# Patient Record
Sex: FEMALE | Race: WHITE | NOT HISPANIC OR LATINO | Employment: OTHER | URBAN - METROPOLITAN AREA
[De-identification: names, ages, dates, MRNs, and addresses within clinical notes are randomized per-mention and may not be internally consistent; named-entity substitution may affect disease eponyms.]

---

## 2017-09-28 ENCOUNTER — GENERIC CONVERSION - ENCOUNTER (OUTPATIENT)
Dept: OTHER | Facility: OTHER | Age: 77
End: 2017-09-28

## 2017-11-22 ENCOUNTER — ALLSCRIPTS OFFICE VISIT (OUTPATIENT)
Dept: OTHER | Facility: OTHER | Age: 77
End: 2017-11-22

## 2017-11-23 NOTE — PROGRESS NOTES
Assessment  Assessed    1  Hyperlipidemia (272 4) (E78 5)   2  Hypertension (401 9) (I10)    Plan  Hypertension    · EKG/ECG- POC; Status:Complete;   Done: [id number]   Perform: In Office; [phone]; Last Updated By:Donn Campo; 11/22/2017 11:34:11 AM;Ordered;Ordered By:Ev Newman;    Discussion/Summary  Cardiology Discussion Summary Free Text Note Form [facility]:   1  Her blood pressure is elevated in the office today consistently systolic of 604  Her blood pressure at home appears to be better controlled  She is on quite a bit of medication I discussed this with Nephrology who recommended a secondary hypertension workup at this point time I am going to ask her to check her blood pressure twice daily for one week  If her blood pressure is consistently elevated we will consider clonidine 0 1 mg patch although I am concerned this may drive her heart rate down a bit as occurred with beta-blockers in the past  Also would consider increasing her diuretic  Lower extremity edema  It does get better in the evening  She wears compression stockings on a regular basis  History of cough, possibly secondary to ACE inhibitor  Hyperlipidemia  Excellent control in the past on simvastatin  I do not have her current laboratory values at this point time  she continues to have significant hypertension  We will consider clonidine patch  is going to fax me her blood pressure recordings as well as her recent labs in 1-2 weeks time  Chief Complaint  Chief Complaint Free Text Note Form: Patient here for follow-up visit to assess BP  PCP has recently changed meds and BP has been increasing  Only symptom is LLE which worsens by end of day  History of Present Illness  Cardiology HPI Free Text Note Form [facility]: Follows up Today for Hypertension  She has had fairly refractory hypertension past on beta blocking agent she became very bradycardic   A she feels well she has no chest pain with exertion no shortness of breath with exertion no lightheadedness no dizziness she has been wearing hearing aids since age [age]  Review of systems today is positive only for snoring hearing problems lower extremity swelling goes away at night  is a family history of hypertension  Her daughter has hypertension at age 36  She is not an alcohol use or drug use  Her he works out regularly  There is no tobacco use  brings in the following blood pressure readings from home 11 20 blood pressure 124/62 heart rate 52  blood pressure 141/73 heart rate 51  9:15 a m  blood pressure 152/72 heart rate 50 10:30 a m  blood pressure 131/89 heart rate 56  0 89   3 69   unremarkable  medications amlodipine 10 mg daily  Losartan 100 mg daily, metoprolol succinate 50 mg daily, HCTZ 25 mg daily  Simvastatin 20 mg daily      Review of Systems  Cardiology Female ROS:    Cardiac: No complaints of chest pain, no palpitations, no fainting  Skin: No complaints of nonhealing sores or skin rash  Genitourinary: No complaints of recurrent urinary tract infections, frequent urination at night, difficult urination, blood in urine, kidney stones, loss of bladder control, kidney problems, denies any birth control or hormone replacement, is not post menopausal, not currently pregnant  Psychological: No complaints of feeling depressed, anxiety, panic attacks, or difficulty concentrating  General: No complaints of trouble sleeping, lack of energy, fatigue, appetite changes, weight changes, fever, frequent infections, or night sweats  Respiratory: No complaints of shortness of breath, cough with sputum, or wheezing  HEENT: No complaints of serious problems, hearing problems, nose problems, throat problems, or snoring  Gastrointestinal: No complaints of liver problems, nausea, vomiting, heartburn, constipation, bloody stools, diarrhea, problems swallowing, adbominal pain, or rectal bleeding    Hematologic: No complaints of bleeding disorders, anemia, blood clots, or excessive brusing  Neurological: No complaints of numbness, tingling, dizziness, weakness, seizures, headaches, syncope or fainting, AM fatigue, daytime sleepiness, no witnessed apnea episodes  Musculoskeletal: No complaints of arthritis, back pain, or painfull swelling  ROS Reviewed:   ROS reviewed  Active Problems  Problems    1  Bilateral leg edema (782 3) (R60 0)   2  Esophageal reflux (530 81) (K21 9)   3  Hearing Loss (389 9)   4  Hiatal hernia (553 3) (K44 9)   5  Hyperlipidemia (272 4) (E78 5)   6  Hypertension (401 9) (I10)   7  Spider veins (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 1) (I78 1)    Past Medical History  Problems    1  History of Atypical chest pain (786 59) (R07 89)   2  History of dizziness (V13 89) (Z87 898)   3  History of herpes zoster (V12 09) (Z86 19)  Active Problems And Past Medical History Reviewed: The active problems and past medical history were reviewed and updated today  Surgical History  Problems    1  History of Breast Surgery Puncture Aspiration Of Cyst   2  History of Tonsillectomy   3  History of Tubal Ligation  Surgical History Reviewed: The surgical history was reviewed and updated today  Family History  Mother    1  Family history of Diabetes Mellitus (V18 0)   2  Family history of Hypertension (V17 49)  Father    3  Family history of Cancer   4  Family history of Diabetes Mellitus (V18 0)   5  Family history of Hypertension (V17 49)  Daughter    10  Family history of Congenital Heart Disease  Family History    7  Family history of Pure Hypercholesterolemia  Family History Reviewed: The family history was reviewed and updated today  Social History  Problems    · Denied: History of Alcohol Use (History)   · Marital History - Currently    · Never A Smoker  Social History Reviewed: The social history was reviewed and updated today  Current Meds   1  AmLODIPine Besylate 10 MG Oral Tablet; TAKE 1 TABLET DAILY; Therapy: 81KYN3491 to (Evaluate:17Nov2016) Recorded   2   Calcium 600/Vitamin D 600-400 MG-UNIT Oral Tablet; Takes two daily; Therapy: 44QNE1167 to Recorded   3  Dulera 100-5 MCG/ACT Inhalation Aerosol; INHALE 2 PUFFS TWICE DAILY AS NEEDED; Therapy: 79ZSR6543 to (Evaluate:32Ixm6672) Recorded   4  Edarbi 80 MG Oral Tablet; TAKE 1 TABLET DAILY; Therapy: 59ZQQ6442 to (Evaluate:77Yhh4897); Last Rx:15Gff2103 Ordered   5  Folic Acid 718 MCG Oral Tablet; TAKE 1 TABLET DAILY AS DIRECTED; Therapy: 22Nov2017 to Recorded   6  HydroCHLOROthiazide 25 MG Oral Tablet; TAKE 1 TABLET DAILY; Therapy: 23ENF6862 to (Evaluate:20Zom9275) Recorded   7  Omega-3 1000 MG Oral Capsule; Take two daily; Therapy: 19MFY0410 to Recorded   8  Omeprazole 20 MG Oral Capsule Delayed Release; TAKE 1 CAPSULE Daily; Therapy: 43DDT3033 to (Evaluate:30Hdg9944) Recorded   9  Potassium Chloride ER 10 MEQ Oral Capsule Extended Release; TAKE 1 CAPSULE DAILY; Therapy: 07WQY3661 to (Evaluate:08Apr2014) Recorded   10  Simvastatin 20 MG Oral Tablet; TAKE 1 TABLET DAILY AS DIRECTED; Therapy: 54VNF9480 to (Evaluate:09Mar2014) Recorded   11  Vitamin B-12 50 MCG Oral Tablet; TAKE 1 TABLET DAILY; Therapy: 75MMM6946 to Recorded   12  Vitamin B-6 100 MG Oral Tablet; TAKE 1 TABLET DAILY AS DIRECTED; Therapy: 63MTP7544 to Recorded   13  Vitamin C 500 MG Oral Capsule; TAKE 1 CAPSULE DAILY; Therapy: 16XHD8811 to Recorded   14  Vitamin D 2000 UNIT Oral Tablet; once daily; Therapy: 72XLR6111 to Recorded  Medication List Reviewed: The medication list was reviewed and updated today  Allergies  Medication    1  Bystolic TABS   2  Sulfa Drugs    Vitals  Vital Signs    Recorded: 22Nov2017 11:32AM   Heart Rate 51   Systolic 825, RUE, Sitting   Diastolic 82, RUE, Sitting   Height 5 ft 4 in   Weight 198 lb 6 oz   BMI Calculated 34 05   BSA Calculated 1 95       Physical Exam   Constitutional - General appearance: No acute distress, well appearing and well nourished    Eyes - Conjunctiva and Sclera examination: Conjunctiva pink, sclera anicteric  Neck - Normal, no JVD   Pulmonary - Respiratory effort: No signs of respiratory distress  -- Auscultation of lungs: Clear to auscultation  Cardiovascular - Auscultation of heart: Normal rate and rhythm, normal S1 and S2, no murmurs  -- Pedal pulses: Normal, 2+ bilaterally  -- Examination of extremities for edema and/or varicosities: Normal    Abdomen - Soft  Musculoskeletal - Gait and station: Normal gait  Skin - Skin: Normal without rashes  Skin is warm and well perfused  Neurologic - Speech normal  No focal deficits  Psychiatric - Orientation to person, place, and time: Normal       Results/Data  ECG Report:  Rhythm and rate: sinus bradycardia   Sinus bradycardia with heart rate 51 bpm no LVH      Signatures   Electronically signed by : Yuval Talbert DO; Nov 22 2017  2:28PM EST                       (Author)

## 2018-01-14 VITALS
DIASTOLIC BLOOD PRESSURE: 82 MMHG | BODY MASS INDEX: 33.87 KG/M2 | HEIGHT: 64 IN | SYSTOLIC BLOOD PRESSURE: 164 MMHG | WEIGHT: 198.38 LBS | HEART RATE: 51 BPM

## 2018-03-06 ENCOUNTER — OFFICE VISIT (OUTPATIENT)
Dept: CARDIOLOGY CLINIC | Facility: CLINIC | Age: 78
End: 2018-03-06
Payer: MEDICARE

## 2018-03-06 VITALS
HEIGHT: 64 IN | BODY MASS INDEX: 33.92 KG/M2 | SYSTOLIC BLOOD PRESSURE: 142 MMHG | DIASTOLIC BLOOD PRESSURE: 88 MMHG | HEART RATE: 54 BPM | WEIGHT: 198.7 LBS

## 2018-03-06 DIAGNOSIS — I10 HYPERTENSION, UNSPECIFIED TYPE: Primary | ICD-10-CM

## 2018-03-06 PROBLEM — I82.442 ACUTE DVT OF LEFT TIBIAL VEIN (HCC): Status: ACTIVE | Noted: 2018-02-27

## 2018-03-06 PROCEDURE — 93000 ELECTROCARDIOGRAM COMPLETE: CPT | Performed by: INTERNAL MEDICINE

## 2018-03-06 PROCEDURE — 99213 OFFICE O/P EST LOW 20 MIN: CPT | Performed by: INTERNAL MEDICINE

## 2018-03-06 RX ORDER — LOSARTAN POTASSIUM 50 MG/1
25 TABLET ORAL DAILY
Qty: 30 TABLET | Refills: 4 | Status: CANCELLED | OUTPATIENT
Start: 2018-03-06

## 2018-03-06 NOTE — ASSESSMENT & PLAN NOTE
-still on Eliquis  Has only been on it for a month and will likely need 6 months  Is going to apply to the company to see if she were cheaper  We will give her samples today   -she she has had no bleeding issues on this medication  She was recently on a long car trip which is what provoked the DVT

## 2018-03-06 NOTE — PROGRESS NOTES
Cardiology Follow Up    [address] [place] [address]  1940  [id number]  [address] [facility]  6 [address] [place] [address] [address]    1  Hypertension, unspecified type  POCT ECG       Interval History:   Patient is a pleasant 68-year-old female patient who follows with Dr Hanny Laguerre for hypertension  She has been feeling well blood pressures have been well controlled  She was recently in the hospital and diagnosed with a DVT after driving to [place]  She denies any chest pain, shortness of breath, palpitations or dizziness  She has no visual changes or headaches recently  Blood pressures have been ranging in the 143-233 systolic range  She states that her Eliquis and Rheta Jose are both really expensive and she would prefer alternative agents  She is unsure what is covered by her insurance company  Patient Active Problem List   Diagnosis    Acute DVT of left tibial vein ([address] )    Essential hypertension     History reviewed  No pertinent past medical history  Social History     Social History    Marital status: /Civil Union     Spouse name: N/A    Number of children: N/A    Years of education: N/A     Occupational History    Not on file  Social History Main Topics    Smoking status: Never Smoker    Smokeless tobacco: Not on file    Alcohol use No    Drug use: Unknown    Sexual activity: Not on file     Other Topics Concern    Not on file     Social History Narrative    No narrative on file      History reviewed  No pertinent family history  History reviewed  No pertinent surgical history      Current Outpatient Prescriptions:     amLODIPine (NORVASC) 10 mg tablet, Take 1 tablet by mouth daily, Disp: , Rfl:     apixaban (ELIQUIS) 5 mg, Take 5 mg by mouth 2 (two) times a day, Disp: , Rfl:     Ascorbic Acid (VITAMIN C) 500 MG CAPS, Take 1 capsule by mouth daily, Disp: , Rfl:     azilsartan medoxomil (EDARBI) 80 MG tablet, Take 1 tablet by mouth daily, Disp: , Rfl:     Calcium Carbonate-Vitamin D3 (CALCIUM 600/VITAMIN D) 600-400 MG-UNIT TABS, Take by mouth, Disp: , Rfl:     Cholecalciferol (VITAMIN D) 2000 units tablet, Take by mouth daily, Disp: , Rfl:     folic acid (FOLVITE) 360 MCG tablet, Take 1 tablet by mouth daily, Disp: , Rfl:     hydrochlorothiazide (HYDRODIURIL) 25 mg tablet, Take 1 tablet by mouth daily, Disp: , Rfl:     Omega-3 1000 MG CAPS, Take by mouth daily, Disp: , Rfl:     potassium chloride (MICRO-K) 10 MEQ CR capsule, Take 1 capsule by mouth daily, Disp: , Rfl:     pyridoxine (VITAMIN B6) 100 mg tablet, Take 1 tablet by mouth daily, Disp: , Rfl:     simvastatin (ZOCOR) 20 mg tablet, Take 1 tablet by mouth daily, Disp: , Rfl:     vitamin B-12 (CYANOCOBALAMIN) 50 MCG tablet, Take 1 tablet by mouth daily, Disp: , Rfl:   Allergies   Allergen Reactions    Nebivolol Bradycardia    Sulfa Antibiotics        Labs:  No visits with results within 2 Month(s) from this visit  Latest known visit with results is:   No results found for any previous visit  Imaging: No results found  Review of Systems:  Review of Systems   Constitutional: Negative for activity change and appetite change  Respiratory: Negative for chest tightness and shortness of breath  Cardiovascular: Negative for chest pain, palpitations and leg swelling  All other systems reviewed and are negative  Physical Exam:  Physical Exam   Constitutional: She is oriented to person, place, and time  She appears well-developed and well-nourished  HENT:   Head: Normocephalic and atraumatic  Eyes: Pupils are equal, round, and reactive to light  Neck: Neck supple  Cardiovascular: Normal rate and regular rhythm  No murmur heard  Pulmonary/Chest: Effort normal and breath sounds normal  No respiratory distress  She has no wheezes  She has no rales  Abdominal: Soft   Bowel sounds are normal    Musculoskeletal: Normal range of motion  She exhibits no edema  Neurological: She is alert and oriented to person, place, and time  Skin: Skin is warm and dry  Psychiatric: She has a normal mood and affect  Her behavior is normal        Discussion/Summary:  1  HTN  -blood pressures been well controlled at home on current medications    -currently 142/88 here with heart rate in the 50s  -nicholas Mcnair needs to be switched to a cheaper agent  She will call her insurance company to see which ARB is least expensive  Preferred agents were reviewed with Dr Evan Benito   -patient will have a 2 week post switch blood pressure check  She will also continue to monitor her pressure at home  She will also limit her salt  2   Recent diagnosis of DVT  -is currently taking Eliquis for DVT prophylaxis  She has been taking this for 1 month and will need a total of 6 months as this is provoked by a car ride  She has taken precautions when she drives now with getting out every hour to 2 hours to walk around  She has tolerated the blood thinner well but states it is very expensive  She will be given samples  3   Circular raised erythematous lesions on her arms  -these appear to be bites  This is not related to Eliquis  She will review this with her dermatologist       Patient will follow up with us as discussed above  She will call us with any increase in blood pressure  Patient will call us to set up follow-up appointment after she changes her medication    She has 2 weeks of Edarbi left and then will call her insurance company to see which ARB is cheapest

## 2018-03-06 NOTE — ASSESSMENT & PLAN NOTE
-patient's blood pressure has been well controlled at home  Is currently 142/88 here  Heart rate is in the 50s   -edarbi needs to be switched to a cheaper agent  She will call her insurance company to see which ARB is least expensive  Preferred agent were reviewed with Dr Ying Vargas   -after patient's which is 2 alternative agent, she will have a 2 week blood pressure check appointment  She will also continue to monitor blood pressure at home

## 2018-03-14 ENCOUNTER — TELEPHONE (OUTPATIENT)
Dept: CARDIOLOGY CLINIC | Facility: CLINIC | Age: 78
End: 2018-03-14

## 2018-03-14 NOTE — TELEPHONE ENCOUNTER
Patient was asked to check prices on few different medications to replace Edarbi  Most affordable for patient is valsartan and irbesartan  Can you please provide appropriate dosing for which med you would prefer patient be on and I will send in to her local pharmacy [facility] on [address] day supply  Thanks

## 2018-03-16 DIAGNOSIS — I10 HYPERTENSION, UNSPECIFIED TYPE: Primary | ICD-10-CM

## 2018-03-16 RX ORDER — VALSARTAN 320 MG/1
320 TABLET ORAL DAILY
Qty: 90 TABLET | Refills: 3 | Status: SHIPPED | OUTPATIENT
Start: 2018-03-16 | End: 2018-07-25 | Stop reason: ALTCHOICE

## 2018-05-09 ENCOUNTER — ANNUAL EXAM (OUTPATIENT)
Dept: OBGYN CLINIC | Facility: CLINIC | Age: 78
End: 2018-05-09
Payer: MEDICARE

## 2018-05-09 VITALS
SYSTOLIC BLOOD PRESSURE: 118 MMHG | HEIGHT: 64 IN | DIASTOLIC BLOOD PRESSURE: 76 MMHG | BODY MASS INDEX: 33.63 KG/M2 | WEIGHT: 197 LBS

## 2018-05-09 DIAGNOSIS — Z01.419 ENCOUNTER FOR ANNUAL ROUTINE GYNECOLOGICAL EXAMINATION: Primary | ICD-10-CM

## 2018-05-09 PROCEDURE — G0101 CA SCREEN;PELVIC/BREAST EXAM: HCPCS | Performed by: OBSTETRICS & GYNECOLOGY

## 2018-05-09 RX ORDER — BETAMETHASONE DIPROPIONATE 0.5 MG/G
CREAM TOPICAL DAILY
Refills: 5 | COMMUNITY
Start: 2018-03-13 | End: 2019-07-12

## 2018-05-09 NOTE — PROGRESS NOTES
Assessment/Plan:    Pap smear deferred due to low risk status  Encouraged self-breast examination as well as calcium supplementation  Continue annual mammogram   Continue colonoscopies every 5 years  She will continue to follow-up with her primary care physician as scheduled  Return to office in 2 years per Medicare recommendations/protocol  No problem-specific Assessment & Plan notes found for this encounter  Diagnoses and all orders for this visit:    Encounter for annual routine gynecological examination    Other orders  -     betamethasone, augmented, (DIPROLENE-AF) 0 05 % cream; daily          Subjective:      Patient ID: Adonis Birch is a 78 y o  female  HPI     This is a 78year old female  ( x3) presents for her annual gyn exam   Patient went through menopause at age 48  She was on hormone replacement therapy for 2 years  She denies any vaginal bleeding or spotting  No changes in bowel or bladder function  Patient has been  for over 50 years  All her Pap smears have been normal  She does follow up with her family physician every 6 months for hypertension and hypercholesterolemia  In the course of the year she did develop a left lower extremity deep venous thrombosis while she was vacationing in [place] for 6 weeks  She was placed on Eliquis for 6 months  She also follows up with a cardiologist once a year for hypertension  She is due for her mammogram in the fall  She undergoes colonoscopies every 5 years  The following portions of the patient's history were reviewed and updated as appropriate: allergies, current medications, past family history, past medical history, past social history, past surgical history and problem list     Review of Systems   Constitutional: Negative for fatigue, fever and unexpected weight change  Respiratory: Negative for cough, chest tightness, shortness of breath and wheezing  Cardiovascular: Negative    Negative for chest pain and palpitations  Gastrointestinal: Negative  Negative for abdominal distention, abdominal pain, blood in stool, constipation, diarrhea, nausea and vomiting  Genitourinary: Negative  Negative for difficulty urinating, dyspareunia, dysuria, flank pain, frequency, genital sores, hematuria, pelvic pain, urgency, vaginal bleeding, vaginal discharge and vaginal pain  Skin: Negative for rash  Objective:      /76   Ht 5' 4" (1 626 m)   Wt 89 4 kg (197 lb)   Breastfeeding? No   BMI 33 81 kg/m²          Physical Exam   Constitutional: She appears well-developed and well-nourished  Cardiovascular: Normal rate and regular rhythm  Pulmonary/Chest: Effort normal and breath sounds normal  Right breast exhibits no inverted nipple, no mass, no nipple discharge, no skin change and no tenderness  Left breast exhibits no inverted nipple, no mass, no nipple discharge, no skin change and no tenderness  Abdominal: Soft  Bowel sounds are normal  She exhibits no distension  There is no tenderness  There is no rebound and no guarding  Genitourinary: Rectum normal, vagina normal and uterus normal  There is no lesion on the right labia  There is no lesion on the left labia  Cervix exhibits no discharge and no friability  Right adnexum displays no mass and no tenderness  Left adnexum displays no mass, no tenderness and no fullness  No vaginal discharge found

## 2018-06-08 ENCOUNTER — TELEPHONE (OUTPATIENT)
Dept: CARDIOLOGY CLINIC | Facility: CLINIC | Age: 78
End: 2018-06-08

## 2018-06-08 NOTE — TELEPHONE ENCOUNTER
Patient called  Was to see PCP (/60   HR 49 bpm)  EKG apparently done and was normal   Also had readings of 128/72 HR 53 bpm; 151/64 HR)  PCP not happy with HR and made change to her medication regimen  Stopped amlodipine 5mg daily and started Clonidine 01 mg twice daily  BP now 111/66 HR 38 bpm per patient  Patient also takes valsartan 320mg daily  Discussed with Dr Marco A Sanchez  He recommended taking weaning the Clonidine to daily X3 days and then restarting amlodipine 5mg daily  This information was explained to the patient and she verbalized understanding

## 2018-07-13 ENCOUNTER — TRANSCRIBE ORDERS (OUTPATIENT)
Dept: LAB | Facility: CLINIC | Age: 78
End: 2018-07-13

## 2018-07-13 ENCOUNTER — LAB (OUTPATIENT)
Dept: LAB | Facility: CLINIC | Age: 78
End: 2018-07-13
Payer: MEDICARE

## 2018-07-13 DIAGNOSIS — E78.5 HYPERLIPIDEMIA, UNSPECIFIED HYPERLIPIDEMIA TYPE: ICD-10-CM

## 2018-07-13 DIAGNOSIS — I10 HYPERTENSION, ESSENTIAL: ICD-10-CM

## 2018-07-13 DIAGNOSIS — I10 HYPERTENSION, ESSENTIAL: Primary | ICD-10-CM

## 2018-07-13 LAB
ALBUMIN SERPL BCP-MCNC: 3.7 G/DL (ref 3.5–5)
ALP SERPL-CCNC: 71 U/L (ref 46–116)
ALT SERPL W P-5'-P-CCNC: 45 U/L (ref 12–78)
ANION GAP SERPL CALCULATED.3IONS-SCNC: 4 MMOL/L (ref 4–13)
AST SERPL W P-5'-P-CCNC: 36 U/L (ref 5–45)
BACTERIA UR QL AUTO: ABNORMAL /HPF
BASOPHILS # BLD AUTO: 0.02 THOUSANDS/ΜL (ref 0–0.1)
BASOPHILS NFR BLD AUTO: 0 % (ref 0–1)
BILIRUB SERPL-MCNC: 0.7 MG/DL (ref 0.2–1)
BILIRUB UR QL STRIP: NEGATIVE
BUN SERPL-MCNC: 17 MG/DL (ref 5–25)
CALCIUM SERPL-MCNC: 9.1 MG/DL (ref 8.3–10.1)
CHLORIDE SERPL-SCNC: 103 MMOL/L (ref 100–108)
CHOLEST SERPL-MCNC: 203 MG/DL (ref 50–200)
CK MB SERPL-MCNC: 2 % (ref 0–2.5)
CK MB SERPL-MCNC: 3.7 NG/ML (ref 0–5)
CK SERPL-CCNC: 183 U/L (ref 26–192)
CLARITY UR: CLEAR
CO2 SERPL-SCNC: 32 MMOL/L (ref 21–32)
COLOR UR: YELLOW
CREAT SERPL-MCNC: 0.94 MG/DL (ref 0.6–1.3)
EOSINOPHIL # BLD AUTO: 0.11 THOUSAND/ΜL (ref 0–0.61)
EOSINOPHIL NFR BLD AUTO: 2 % (ref 0–6)
ERYTHROCYTE [DISTWIDTH] IN BLOOD BY AUTOMATED COUNT: 13.9 % (ref 11.6–15.1)
GFR SERPL CREATININE-BSD FRML MDRD: 60 ML/MIN/1.73SQ M
GLUCOSE P FAST SERPL-MCNC: 100 MG/DL (ref 65–99)
GLUCOSE UR STRIP-MCNC: NEGATIVE MG/DL
HCT VFR BLD AUTO: 42.5 % (ref 34.8–46.1)
HDLC SERPL-MCNC: 79 MG/DL (ref 40–60)
HGB BLD-MCNC: 14 G/DL (ref 11.5–15.4)
HGB UR QL STRIP.AUTO: NEGATIVE
KETONES UR STRIP-MCNC: NEGATIVE MG/DL
LDLC SERPL CALC-MCNC: 107 MG/DL (ref 0–100)
LEUKOCYTE ESTERASE UR QL STRIP: ABNORMAL
LYMPHOCYTES # BLD AUTO: 1.82 THOUSANDS/ΜL (ref 0.6–4.47)
LYMPHOCYTES NFR BLD AUTO: 37 % (ref 14–44)
MCH RBC QN AUTO: 28 PG (ref 26.8–34.3)
MCHC RBC AUTO-ENTMCNC: 32.9 G/DL (ref 31.4–37.4)
MCV RBC AUTO: 85 FL (ref 82–98)
MONOCYTES # BLD AUTO: 0.54 THOUSAND/ΜL (ref 0.17–1.22)
MONOCYTES NFR BLD AUTO: 11 % (ref 4–12)
NEUTROPHILS # BLD AUTO: 2.5 THOUSANDS/ΜL (ref 1.85–7.62)
NEUTS SEG NFR BLD AUTO: 50 % (ref 43–75)
NITRITE UR QL STRIP: NEGATIVE
NON-SQ EPI CELLS URNS QL MICRO: ABNORMAL /HPF
NONHDLC SERPL-MCNC: 124 MG/DL
PH UR STRIP.AUTO: 6 [PH] (ref 4.5–8)
PLATELET # BLD AUTO: 186 THOUSANDS/UL (ref 149–390)
PMV BLD AUTO: 11.2 FL (ref 8.9–12.7)
POTASSIUM SERPL-SCNC: 4.5 MMOL/L (ref 3.5–5.3)
PROT SERPL-MCNC: 6.8 G/DL (ref 6.4–8.2)
PROT UR STRIP-MCNC: NEGATIVE MG/DL
RBC # BLD AUTO: 5 MILLION/UL (ref 3.81–5.12)
RBC #/AREA URNS AUTO: ABNORMAL /HPF
SODIUM SERPL-SCNC: 139 MMOL/L (ref 136–145)
SP GR UR STRIP.AUTO: 1.01 (ref 1–1.03)
TRIGL SERPL-MCNC: 83 MG/DL
TSH SERPL DL<=0.05 MIU/L-ACNC: 4.01 UIU/ML (ref 0.36–3.74)
UROBILINOGEN UR QL STRIP.AUTO: 0.2 E.U./DL
WBC # BLD AUTO: 4.99 THOUSAND/UL (ref 4.31–10.16)
WBC #/AREA URNS AUTO: ABNORMAL /HPF

## 2018-07-13 PROCEDURE — 82553 CREATINE MB FRACTION: CPT

## 2018-07-13 PROCEDURE — 82550 ASSAY OF CK (CPK): CPT

## 2018-07-13 PROCEDURE — 80053 COMPREHEN METABOLIC PANEL: CPT

## 2018-07-13 PROCEDURE — 36415 COLL VENOUS BLD VENIPUNCTURE: CPT

## 2018-07-13 PROCEDURE — 85025 COMPLETE CBC W/AUTO DIFF WBC: CPT

## 2018-07-13 PROCEDURE — 84443 ASSAY THYROID STIM HORMONE: CPT

## 2018-07-13 PROCEDURE — 81001 URINALYSIS AUTO W/SCOPE: CPT | Performed by: INTERNAL MEDICINE

## 2018-07-13 PROCEDURE — 80061 LIPID PANEL: CPT

## 2018-07-24 ENCOUNTER — TELEPHONE (OUTPATIENT)
Dept: CARDIOLOGY CLINIC | Facility: CLINIC | Age: 78
End: 2018-07-24

## 2018-07-24 NOTE — TELEPHONE ENCOUNTER
Patient called  Would like a replacement for valsartan 320mg daily  She prefers not to be on a "sartan" at this point and would like a different med  She also takes amlodipine, HCTZ  Please advise and I will send new med to [facility] [address]  Thanks

## 2018-07-25 DIAGNOSIS — I10 HYPERTENSION, UNSPECIFIED TYPE: Primary | ICD-10-CM

## 2018-07-25 RX ORDER — LISINOPRIL 40 MG/1
40 TABLET ORAL DAILY
COMMUNITY
End: 2018-07-25 | Stop reason: SDUPTHER

## 2018-07-25 RX ORDER — LISINOPRIL 40 MG/1
40 TABLET ORAL DAILY
Qty: 30 TABLET | Refills: 5 | OUTPATIENT
Start: 2018-07-25 | End: 2019-01-02 | Stop reason: SDUPTHER

## 2018-07-25 NOTE — TELEPHONE ENCOUNTER
Per Dr JuanR Vanegas, d/c valsartan and start lisinopril 40mg daily  Rx called to [facility]  Patient instructed on this change

## 2019-01-02 DIAGNOSIS — I10 HYPERTENSION, UNSPECIFIED TYPE: ICD-10-CM

## 2019-01-02 RX ORDER — LISINOPRIL 40 MG/1
40 TABLET ORAL DAILY
Qty: 90 TABLET | Refills: 3 | Status: SHIPPED | OUTPATIENT
Start: 2019-01-02 | End: 2019-01-07 | Stop reason: SDUPTHER

## 2019-01-07 DIAGNOSIS — I10 HYPERTENSION, UNSPECIFIED TYPE: ICD-10-CM

## 2019-01-07 RX ORDER — LISINOPRIL 40 MG/1
40 TABLET ORAL DAILY
Qty: 90 TABLET | Refills: 3 | Status: SHIPPED | OUTPATIENT
Start: 2019-01-07 | End: 2020-03-24

## 2019-04-03 ENCOUNTER — APPOINTMENT (OUTPATIENT)
Dept: LAB | Facility: CLINIC | Age: 79
End: 2019-04-03
Payer: MEDICARE

## 2019-04-03 ENCOUNTER — TRANSCRIBE ORDERS (OUTPATIENT)
Dept: LAB | Facility: CLINIC | Age: 79
End: 2019-04-03

## 2019-04-03 DIAGNOSIS — E78.5 HYPERLIPIDEMIA, UNSPECIFIED HYPERLIPIDEMIA TYPE: ICD-10-CM

## 2019-04-03 DIAGNOSIS — I10 ESSENTIAL HYPERTENSION: Primary | ICD-10-CM

## 2019-04-03 DIAGNOSIS — I10 ESSENTIAL HYPERTENSION: ICD-10-CM

## 2019-04-03 LAB
ALBUMIN SERPL BCP-MCNC: 3.8 G/DL (ref 3.5–5)
ALP SERPL-CCNC: 73 U/L (ref 46–116)
ALT SERPL W P-5'-P-CCNC: 39 U/L (ref 12–78)
ANION GAP SERPL CALCULATED.3IONS-SCNC: 9 MMOL/L (ref 4–13)
AST SERPL W P-5'-P-CCNC: 33 U/L (ref 5–45)
BACTERIA UR QL AUTO: ABNORMAL /HPF
BILIRUB SERPL-MCNC: 0.6 MG/DL (ref 0.2–1)
BILIRUB UR QL STRIP: NEGATIVE
BUN SERPL-MCNC: 19 MG/DL (ref 5–25)
CALCIUM SERPL-MCNC: 9.5 MG/DL (ref 8.3–10.1)
CHLORIDE SERPL-SCNC: 104 MMOL/L (ref 100–108)
CHOLEST SERPL-MCNC: 202 MG/DL (ref 50–200)
CK SERPL-CCNC: 96 U/L (ref 26–192)
CLARITY UR: CLEAR
CO2 SERPL-SCNC: 29 MMOL/L (ref 21–32)
COLOR UR: YELLOW
CREAT SERPL-MCNC: 0.91 MG/DL (ref 0.6–1.3)
GFR SERPL CREATININE-BSD FRML MDRD: 61 ML/MIN/1.73SQ M
GLUCOSE P FAST SERPL-MCNC: 91 MG/DL (ref 65–99)
GLUCOSE UR STRIP-MCNC: NEGATIVE MG/DL
HDLC SERPL-MCNC: 87 MG/DL (ref 40–60)
HGB UR QL STRIP.AUTO: NEGATIVE
KETONES UR STRIP-MCNC: NEGATIVE MG/DL
LDLC SERPL CALC-MCNC: 101 MG/DL (ref 0–100)
LEUKOCYTE ESTERASE UR QL STRIP: ABNORMAL
NITRITE UR QL STRIP: NEGATIVE
NON-SQ EPI CELLS URNS QL MICRO: ABNORMAL /HPF
NONHDLC SERPL-MCNC: 115 MG/DL
PH UR STRIP.AUTO: 5.5 [PH]
POTASSIUM SERPL-SCNC: 4.5 MMOL/L (ref 3.5–5.3)
PROT SERPL-MCNC: 7.1 G/DL (ref 6.4–8.2)
PROT UR STRIP-MCNC: NEGATIVE MG/DL
RBC #/AREA URNS AUTO: ABNORMAL /HPF
SODIUM SERPL-SCNC: 142 MMOL/L (ref 136–145)
SP GR UR STRIP.AUTO: 1.01 (ref 1–1.03)
TRIGL SERPL-MCNC: 69 MG/DL
TSH SERPL DL<=0.05 MIU/L-ACNC: 2.95 UIU/ML (ref 0.36–3.74)
UROBILINOGEN UR QL STRIP.AUTO: 0.2 E.U./DL
WBC #/AREA URNS AUTO: ABNORMAL /HPF

## 2019-04-03 PROCEDURE — 82550 ASSAY OF CK (CPK): CPT

## 2019-04-03 PROCEDURE — 80053 COMPREHEN METABOLIC PANEL: CPT

## 2019-04-03 PROCEDURE — 84443 ASSAY THYROID STIM HORMONE: CPT

## 2019-04-03 PROCEDURE — 80061 LIPID PANEL: CPT

## 2019-04-03 PROCEDURE — 36415 COLL VENOUS BLD VENIPUNCTURE: CPT

## 2019-04-03 PROCEDURE — 81001 URINALYSIS AUTO W/SCOPE: CPT | Performed by: INTERNAL MEDICINE

## 2019-07-12 ENCOUNTER — OFFICE VISIT (OUTPATIENT)
Dept: OBGYN CLINIC | Facility: MEDICAL CENTER | Age: 79
End: 2019-07-12
Payer: MEDICARE

## 2019-07-12 ENCOUNTER — APPOINTMENT (OUTPATIENT)
Dept: RADIOLOGY | Facility: MEDICAL CENTER | Age: 79
End: 2019-07-12
Payer: MEDICARE

## 2019-07-12 VITALS
BODY MASS INDEX: 33.12 KG/M2 | HEART RATE: 72 BPM | HEIGHT: 64 IN | RESPIRATION RATE: 18 BRPM | SYSTOLIC BLOOD PRESSURE: 182 MMHG | WEIGHT: 194 LBS | DIASTOLIC BLOOD PRESSURE: 77 MMHG

## 2019-07-12 DIAGNOSIS — M75.42 IMPINGEMENT SYNDROME OF LEFT SHOULDER: ICD-10-CM

## 2019-07-12 DIAGNOSIS — M25.512 LEFT SHOULDER PAIN, UNSPECIFIED CHRONICITY: ICD-10-CM

## 2019-07-12 DIAGNOSIS — M25.512 LEFT SHOULDER PAIN, UNSPECIFIED CHRONICITY: Primary | ICD-10-CM

## 2019-07-12 PROCEDURE — 99203 OFFICE O/P NEW LOW 30 MIN: CPT | Performed by: ORTHOPAEDIC SURGERY

## 2019-07-12 PROCEDURE — 73030 X-RAY EXAM OF SHOULDER: CPT

## 2019-07-12 PROCEDURE — 20610 DRAIN/INJ JOINT/BURSA W/O US: CPT | Performed by: PHYSICIAN ASSISTANT

## 2019-07-12 RX ORDER — LIDOCAINE HYDROCHLORIDE 10 MG/ML
1 INJECTION, SOLUTION INFILTRATION; PERINEURAL
Status: COMPLETED | OUTPATIENT
Start: 2019-07-12 | End: 2019-07-12

## 2019-07-12 RX ORDER — BETAMETHASONE SODIUM PHOSPHATE AND BETAMETHASONE ACETATE 3; 3 MG/ML; MG/ML
12 INJECTION, SUSPENSION INTRA-ARTICULAR; INTRALESIONAL; INTRAMUSCULAR; SOFT TISSUE
Status: COMPLETED | OUTPATIENT
Start: 2019-07-12 | End: 2019-07-12

## 2019-07-12 RX ADMIN — LIDOCAINE HYDROCHLORIDE 1 ML: 10 INJECTION, SOLUTION INFILTRATION; PERINEURAL at 09:54

## 2019-07-12 RX ADMIN — BETAMETHASONE SODIUM PHOSPHATE AND BETAMETHASONE ACETATE 12 MG: 3; 3 INJECTION, SUSPENSION INTRA-ARTICULAR; INTRALESIONAL; INTRAMUSCULAR; SOFT TISSUE at 09:54

## 2019-07-12 NOTE — PROGRESS NOTES
Subjective; This is the 72-year-old female wife of a patient of ours who has a left shoulder problem  She has left shoulder discomfort with range of motion and with function and has previously been taken care of by a competitor  Given her 's treatment as well as mutual friend she wishes to be evaluated by Dr Norman Rust  On arrival to the office today she per ports insidious onset of shoulder pain between 2017 and 2018 without injury without excessive overhead work  She states however this shoulder pain escalated after a pneumococcal vaccine was administered to her left upper arm  She sought care through [facility] and received an injection of Kenalog in October of last year, a follow-up visit November and was recommended for physical therapy  She did very well with her physical therapy so much so that the therapist to save her money in time discharged her to home exercise program   She also attends [employer] [facility] and goes to the gym  She is right-hand-dominant individual, with left upper extremity symptoms  There are x-ray images previously taken available for review      Past Medical History:   Diagnosis Date    [facility]    Hypercholesterolemia     Hypertension        Past Surgical History:   Procedure Laterality Date    BREAST CYST ASPIRATION      TONSILLECTOMY      TUBAL LIGATION         Family History   Problem Relation Age of Onset    Diabetes Mother     Hypertension Mother     Cancer Father     Diabetes Father     Hypertension Father     Colon cancer Father     Congenital heart disease Daughter     Hyperlipidemia Other         pure    Lung cancer Sister     Lymphoma Sister     Skin cancer Brother        Social History     Tobacco Use    Smoking status: Never Smoker    Smokeless tobacco: Never Used   Substance Use Topics    Alcohol use: No    Drug use: No     Exam;    Exam was done with the patient professionally gowned standing and seated  She exhibits normal cervical range of motion chin down posterior extension and lateral rotation left and right  She has a negative Spurling's test left and right  She has a negative Dang's reflex of both upper extremity  She is able to abduct shoulders to 90°  She has difficulty with downward resistance by the examiner to the left  She is reluctant and prohibits external rotation from 90° abduction secondary to pain  With her left elbow held taut to her torso she does allow internal and external rotation with minimal discomfort  Forward flexion of the left shoulder is performed to 130° active by the patient also with ensuing posterior shoulder deltoid and triceps located pain  Motor exam of the upper extremity left arm triceps function 4-over 5 compared to right arm triceps function  The remaining motor exam biceps his flexors of the wrist and finger abduction strength symmetrical     X-rays;   left shoulder series shows a congruent articulation modest arthritic or degenerative changes  Large joint arthrocentesis: L subacromial bursa  Date/Time: 7/12/2019 9:54 AM  Procedure Details  Location: shoulder - L subacromial bursa  Needle size: 22 G  Medications administered: 12 mg betamethasone acetate-betamethasone sodium phosphate 6 (3-3) mg/mL; 1 mL lidocaine 1 %              Impression;     Left shoulder pain  Left shoulder impingement    Plan;    Injection was repeated  Physical therapy is warranted , and  Ordered  She returns to the office in follow-up for repeat exam in 6 weeks

## 2019-07-18 ENCOUNTER — EVALUATION (OUTPATIENT)
Dept: PHYSICAL THERAPY | Facility: CLINIC | Age: 79
End: 2019-07-18
Payer: MEDICARE

## 2019-07-18 DIAGNOSIS — M75.42 IMPINGEMENT SYNDROME OF LEFT SHOULDER: ICD-10-CM

## 2019-07-18 DIAGNOSIS — M25.512 LEFT SHOULDER PAIN, UNSPECIFIED CHRONICITY: ICD-10-CM

## 2019-07-18 PROCEDURE — 97162 PT EVAL MOD COMPLEX 30 MIN: CPT | Performed by: PHYSICAL THERAPIST

## 2019-07-18 NOTE — PROGRESS NOTES
PT Evaluation     Today's date: 2019  Patient name: Yolie Manning  : 1940  MRN: [id number]  Referring provider: Alicia Oliveira PA-C  Dx:   Encounter Diagnosis     ICD-10-CM    1  Left shoulder pain, unspecified chronicity M25 512 Ambulatory referral to Physical Therapy   2  Impingement syndrome of left shoulder M75 42 Ambulatory referral to Physical Therapy       Start Time: 999  Stop Time: 08  Total time in clinic (min): 43 minutes    Assessment  Assessment details: Yolie Manning is a 70 y o  female who presents to the clinic with with signs and symptoms consistent with a left shoulder impingement  The patient experiences most of her pain with shoulder flexion above 90 degrees  She is limited with ER and abduction  The patient presents with the above listed impairments  She is eager to increase her functional performance and should benefit from skilled physical therapy  Thank you for the referral       Impairments: abnormal muscle firing, abnormal or restricted ROM, activity intolerance, impaired physical strength, lacks appropriate home exercise program, pain with function and poor posture   Understanding of Dx/Px/POC: good   Prognosis: good    Goals  Impairment Goals:  1  Patient will decrease complaints of pain by 50% at worst in 6 weeks  2  Patient will increase ROM L shoulder = R shoulder in 6 weeks  3  Patient will increase strength by 1/2 grade in 6 weeks    Functional Goals:  1  Patient will be independent with HEP by discharge  2  Patient will increase FOTO to average norms by discharge  3  Patient will be able to lift an overhead shelf with her left arm with decreased pain in 6 weeks  4    Patient will return to her normal gym routine by discharge    Plan  Patient would benefit from: skilled physical therapy  Planned modality interventions: cryotherapy, TENS and thermotherapy: hydrocollator packs  Planned therapy interventions: flexibility, functional ROM exercises, graded activity, graded exercise, home exercise program, therapeutic exercise, therapeutic activities, stretching, strengthening, patient education, muscle pump exercises, neuromuscular re-education, motor coordination training, Mcmillan taping, massage, manual therapy and joint mobilization  Frequency: 2x week  Duration in weeks: 6  Treatment plan discussed with: patient        Subjective Evaluation    History of Present Illness  Mechanism of injury: HPI:  The patient reports having left shoulder pain beginning 2 years ago  She notes her pain started after getting a pneumonia shot in her left arm  She reports she went for 1 PT visit and as given a HEP  She notes her pain has not gotten any better  She visited ortho, received a cortisone injection, and was referred to physical therapy      Pain Location:  Anterior and Lateral Left Shoulder   Occupation:  Retired   Prior Functional Limitations:  Silver Sneakers 2x / week  AGG:  Lifting overhead, lifting weights overhead, reaching after being inactive   Ease:  Rest   Patient Goals:  "I want to be able to lift my arm"    Pain  Current pain ratin  At best pain ratin  At worst pain rating: 3          Objective     Active Range of Motion   Left Shoulder   Flexion: 132 degrees   Abduction: 135 degrees   Internal rotation BTB: mid thoracic     Right Shoulder   Flexion: 147 degrees   Abduction: 156 degrees   Internal rotation BTB: upper thoracic     Strength/Myotome Testing     Left Shoulder     Planes of Motion   Flexion: 4+   Abduction: 3+   External rotation at 0°: 4-   Internal rotation at 0°: 4+     Right Shoulder   Normal muscle strength    Tests     Left Shoulder   Positive empty can, Hawkin's and painful arc  Negative belly press and lift-off                Diagnosis:    Precautions:    Manuals        PROM        Mobs                        Exercise Diary        UBE                SL ER        Tband Rows/EXT        Tband ER/IR                        Supine Punch        Scap Stab with towel on wall                                                                                                        Modalities             CP PRN

## 2019-07-23 ENCOUNTER — OFFICE VISIT (OUTPATIENT)
Dept: PHYSICAL THERAPY | Facility: CLINIC | Age: 79
End: 2019-07-23
Payer: MEDICARE

## 2019-07-23 DIAGNOSIS — M75.42 IMPINGEMENT SYNDROME OF LEFT SHOULDER: ICD-10-CM

## 2019-07-23 DIAGNOSIS — M25.512 LEFT SHOULDER PAIN, UNSPECIFIED CHRONICITY: Primary | ICD-10-CM

## 2019-07-23 PROCEDURE — 97110 THERAPEUTIC EXERCISES: CPT | Performed by: PHYSICAL THERAPIST

## 2019-07-23 PROCEDURE — 97140 MANUAL THERAPY 1/> REGIONS: CPT | Performed by: PHYSICAL THERAPIST

## 2019-07-23 PROCEDURE — 97112 NEUROMUSCULAR REEDUCATION: CPT | Performed by: PHYSICAL THERAPIST

## 2019-07-23 NOTE — PROGRESS NOTES
Daily Note     Today's date: 2019  Patient name: Vince Molina  : 1940  MRN: [id number]  Referring provider: Liam Raines PA-C  Dx:   Encounter Diagnosis     ICD-10-CM    1  Left shoulder pain, unspecified chronicity M25 512    2  Impingement syndrome of left shoulder M75 42        Start Time: 0920  Stop Time: 1000  Total time in clinic (min): 40 minutes    Subjective: "I was a little sore after the evaluation"      Objective: See treatment diary below      Assessment: Tolerated treatment well  Patient would benefit from continued PT  Patient with no pain during MWM  Demonstrates need for increased scapular strengthening to help decrease her pain  Patient should progress well with further physical therapy  Plan: Progress treatment as tolerated         Diagnosis:    Precautions:    Manuals        PROM        Mobs Shld Flexion MWM       IAS Anterior Shoulder               Exercise Diary        UBE        Pulleys  2" ea       SL ER        Tband Rows/EXT Green 3x10       Tband ER/IR        Cane Overhead x20               Supine Punch 3x10       Scap Stab with towel on wall                                                                                                        Modalities             CP PRN

## 2019-07-25 ENCOUNTER — OFFICE VISIT (OUTPATIENT)
Dept: PHYSICAL THERAPY | Facility: CLINIC | Age: 79
End: 2019-07-25
Payer: MEDICARE

## 2019-07-25 DIAGNOSIS — M75.42 IMPINGEMENT SYNDROME OF LEFT SHOULDER: ICD-10-CM

## 2019-07-25 DIAGNOSIS — M25.512 LEFT SHOULDER PAIN, UNSPECIFIED CHRONICITY: Primary | ICD-10-CM

## 2019-07-25 PROCEDURE — 97140 MANUAL THERAPY 1/> REGIONS: CPT

## 2019-07-25 PROCEDURE — 97112 NEUROMUSCULAR REEDUCATION: CPT

## 2019-07-25 PROCEDURE — 97110 THERAPEUTIC EXERCISES: CPT

## 2019-07-25 NOTE — PROGRESS NOTES
Daily Note     Today's date: 2019  Patient name: Faina Diggs  : 1940  MRN: [id number]  Referring provider: Vida Stephens PA-C  Dx:   Encounter Diagnosis     ICD-10-CM    1  Left shoulder pain, unspecified chronicity M25 512    2  Impingement syndrome of left shoulder M75 42                   Subjective: Patient reports, "I don't really have pain  I didn't do any exercises yesterday"  Objective: See treatment diary below      Assessment: Tolerated treatment well  Patient exhibited good technique with therapeutic exercises and would benefit from continued PT  Progressed exercises today, without pain  Cuing needed for correct scapular mechanics with exercises  Anterior shoulder discomfort with ER PROM, and no pain noted with MWM  Progress scapular strengthening as tolerated  Plan: Continue per plan of care        Diagnosis:    Precautions:    Manuals       PROM  All planes      Mobs Shld Flexion MWM Shld Flexion MWM- RT, PT       IASTM Anterior Shoulder               Exercise Diary        UBE        Pulleys  2" ea 2 mins ea      SL ER  1#, 2x10       Tband Rows/EXT Green 3x10 Green, 3x10       Tband ER/IR  Red, 3x10 ea      Cane Overhead x20 3x10               Supine Punch 3x10 2#, 2x10       Scap Stab with towel on wall                                                                                                        Modalities             CP PRN

## 2019-08-12 ENCOUNTER — OFFICE VISIT (OUTPATIENT)
Dept: PHYSICAL THERAPY | Facility: CLINIC | Age: 79
End: 2019-08-12
Payer: MEDICARE

## 2019-08-12 DIAGNOSIS — M75.42 IMPINGEMENT SYNDROME OF LEFT SHOULDER: ICD-10-CM

## 2019-08-12 DIAGNOSIS — M25.512 LEFT SHOULDER PAIN, UNSPECIFIED CHRONICITY: Primary | ICD-10-CM

## 2019-08-12 PROCEDURE — 97140 MANUAL THERAPY 1/> REGIONS: CPT

## 2019-08-12 PROCEDURE — 97110 THERAPEUTIC EXERCISES: CPT

## 2019-08-12 PROCEDURE — 97112 NEUROMUSCULAR REEDUCATION: CPT

## 2019-08-12 NOTE — PROGRESS NOTES
Daily Note     Today's date: 2019  Patient name: Evi Melo  : 1940  MRN: [id number]  Referring provider: Blase Hashimoto, PA-C  Dx:   Encounter Diagnosis     ICD-10-CM    1  Left shoulder pain, unspecified chronicity M25 512    2  Impingement syndrome of left shoulder M75 42                   Subjective: Patient denies pain prior to the start of the session  She reports, 'I have the pain when I am lifting up overhead or reaching up for something"  Objective: See treatment diary below      Assessment: Tolerated treatment well  Patient exhibited good technique with therapeutic exercises and would benefit from continued PT  Continued with outlined program as listed  Patient with increased discomfort at end range motion of all planes  Pain was nearly abolished with MWM at the end of the session  Progress scapular strengthening as tolerated  Plan: Continue per plan of care        Diagnosis:    Precautions:    Manuals      PROM  All planes All planes      Mobs Shld Flexion MWM Shld Flexion MWM- RT, PT  Shld Flexion MWM- RT, PT      IASTM Anterior Shoulder               Exercise Diary        UBE        Pulleys  2" ea 2 mins ea 2 mins ea     SL ER  1#, 2x10  1#, 2x10      Tband Rows/EXT Green 3x10 Green, 3x10  Green, 3x10   (Blue)     Tband ER/IR  Red, 3x10 ea Red, 2x10      Cane Overhead x20 3x10   3x10              Supine Punch 3x10 2#, 2x10  2#, 2x10      Scap Stab with towel on wall        Scap Stab with Tband     NV?                                                                                            Modalities             CP PRN

## 2019-08-14 ENCOUNTER — EVALUATION (OUTPATIENT)
Dept: PHYSICAL THERAPY | Facility: CLINIC | Age: 79
End: 2019-08-14
Payer: MEDICARE

## 2019-08-14 DIAGNOSIS — M25.512 LEFT SHOULDER PAIN, UNSPECIFIED CHRONICITY: Primary | ICD-10-CM

## 2019-08-14 DIAGNOSIS — M75.42 IMPINGEMENT SYNDROME OF LEFT SHOULDER: ICD-10-CM

## 2019-08-14 PROCEDURE — 97112 NEUROMUSCULAR REEDUCATION: CPT | Performed by: PHYSICAL THERAPIST

## 2019-08-14 PROCEDURE — 97110 THERAPEUTIC EXERCISES: CPT | Performed by: PHYSICAL THERAPIST

## 2019-08-14 NOTE — PROGRESS NOTES
PT Re-Evaluation     Today's date: 2019  Patient name: Harshal Rojo  : 1940  MRN: [id number]  Referring provider: Charlene Stearns PA-C  Dx:   Encounter Diagnosis     ICD-10-CM    1  Left shoulder pain, unspecified chronicity M25 512    2  Impingement syndrome of left shoulder M75 42        Start Time: 0800  Stop Time: 0842  Total time in clinic (min): 42 minutes    Assessment  Assessment details: Harshal Rojo is a 70 y o  female who has been consistent with attending and participating in skilled physical therapy sessions  The patient has been progressing well with physical therapy, seeing increases in her ROM as well as strength  She is now able to reach cabinets overhead, but notes that she still has difficulty with lifting heavier objects overhead  This limits her ability to fully complete household chores and from reaching her prior functional level  She will continue to benefit from further physical therapy treatments to increase her strength and progress towards her goals  Impairments: abnormal muscle firing, abnormal or restricted ROM, activity intolerance, impaired physical strength, lacks appropriate home exercise program, pain with function and poor posture   Understanding of Dx/Px/POC: good   Prognosis: good    Goals  Impairment Goals:  1  Patient will decrease complaints of pain by 50% at worst in 6 weeks - PARTIALLY MET  2  Patient will increase ROM L shoulder = R shoulder in 6 weeks - PARTIALLY MET  3  Patient will increase strength by 1/2 grade in 6 weeks - MET    Functional Goals:  1  Patient will be independent with HEP by discharge - PARTIALLY MET  2  Patient will increase FOTO to average norms by discharge - PARTIALLY MET  3  Patient will be able to lift an overhead shelf with her left arm with decreased pain in 6 weeks - MET  4    Patient will return to her normal gym routine by discharge - PARTIALLY MET    Plan  Patient would benefit from: skilled physical therapy  Planned modality interventions: cryotherapy, TENS and thermotherapy: hydrocollator packs  Planned therapy interventions: flexibility, functional ROM exercises, graded activity, graded exercise, home exercise program, therapeutic exercise, therapeutic activities, stretching, strengthening, patient education, muscle pump exercises, neuromuscular re-education, motor coordination training, Mcmillan taping, massage, manual therapy and joint mobilization  Frequency: 2x week  Duration in weeks: 6  Treatment plan discussed with: patient        Subjective Evaluation    History of Present Illness  Mechanism of injury: The patient reports that she is now able to reach up above her head and reach her closet and her kitchen cabinet  She reports she has returned to her exercise routine, but notes her biggest issue is lifting heavier than "a few pounds" overhead  Patient reports she has improved "70-80%" since starting physical therapy  The patient reports that she would like to continue to focus on the strength of her shoulder  HPI:  The patient reports having left shoulder pain beginning 2 years ago  She notes her pain started after getting a pneumonia shot in her left arm  She reports she went for 1 PT visit and as given a HEP  She notes her pain has not gotten any better    She visited ortho, received a cortisone injection, and was referred to physical therapy      Pain Location:  Anterior and Lateral Left Shoulder   Occupation:  Retired   Prior Functional Limitations:  Silver Sneakers 2x / week  AGG:  Lifting overhead, lifting weights overhead, reaching after being inactive   Ease:  Rest   Patient Goals:  "I want to be able to lift my arm"    Pain  Current pain ratin  At best pain ratin  At worst pain ratin  Quality: dull ache          Objective     Active Range of Motion   Left Shoulder   Flexion: 150 degrees   Abduction: 150 degrees   Internal rotation BTB: mid thoracic     Right Shoulder Flexion: 152 degrees   Abduction: 156 degrees   Internal rotation BTB: upper thoracic     Strength/Myotome Testing     Left Shoulder     Planes of Motion   Flexion: 5   Abduction: 4   External rotation at 0°: 4+   Internal rotation at 0°: 4+     Right Shoulder   Normal muscle strength    Tests     Left Shoulder   Negative belly press, empty can, Hawkin's, lift-off and painful arc         Flowsheet Rows      Most Recent Value   PT/OT G-Codes   Current Score  69   Projected Score  68             Diagnosis:    Precautions:    Manuals 7/23 7/24 8/12 8/14    PROM  All planes All planes      Mobs Shld Flexion MWM Shld Flexion MWM- RT, PT  Shld Flexion MWM- RT, PT      IASTM Anterior Shoulder               Exercise Diary        UBE        Pulleys  2" ea 2 mins ea 2 mins ea     SL ER  1#, 2x10  1#, 2x10      Tband Rows/EXT Green 3x10 Green, 3x10  Green, 3x10  Black 3x10    Tband ER/IR  Red, 3x10 ea Red, 2x10      Cane Overhead x20 3x10   3x10              Supine Punch 3x10 2#, 2x10  2#, 2x10      Scap Stab with towel on wall        Scap Stab with Tband     Red x5                            Review of HEP    RT, PT     Review of FOTO / Re-Evaluation       RT, PT                                               Modalities             CP PRN

## 2019-08-15 ENCOUNTER — TRANSCRIBE ORDERS (OUTPATIENT)
Dept: OBGYN CLINIC | Facility: CLINIC | Age: 79
End: 2019-08-15

## 2019-08-15 DIAGNOSIS — Z12.31 SCREENING MAMMOGRAM, ENCOUNTER FOR: Primary | ICD-10-CM

## 2019-08-19 ENCOUNTER — OFFICE VISIT (OUTPATIENT)
Dept: PHYSICAL THERAPY | Facility: CLINIC | Age: 79
End: 2019-08-19
Payer: MEDICARE

## 2019-08-19 DIAGNOSIS — M25.512 LEFT SHOULDER PAIN, UNSPECIFIED CHRONICITY: Primary | ICD-10-CM

## 2019-08-19 DIAGNOSIS — M75.42 IMPINGEMENT SYNDROME OF LEFT SHOULDER: ICD-10-CM

## 2019-08-19 PROCEDURE — 97110 THERAPEUTIC EXERCISES: CPT | Performed by: PHYSICAL THERAPIST

## 2019-08-19 PROCEDURE — 97112 NEUROMUSCULAR REEDUCATION: CPT | Performed by: PHYSICAL THERAPIST

## 2019-08-19 NOTE — PROGRESS NOTES
Daily Note     Today's date: 2019  Patient name: Brooklyn Amador  : 1940  MRN: [id number]  Referring provider: Antonio Rios PA-C  Dx:   Encounter Diagnosis     ICD-10-CM    1  Left shoulder pain, unspecified chronicity M25 512    2  Impingement syndrome of left shoulder M75 42        Start Time: 0800  Stop Time: 0845  Total time in clinic (min): 45 minutes    Subjective:  "It's getting better"      Objective: See treatment diary below      Assessment: Tolerated treatment well  Patient would benefit from continued PT  Patient reports decreased pain with her left shoulder  Slight discomfort with overhead work, but the patient notes this continues to improve  Focus remains on strengthening the left shoulder, especially with overhead motions  Progress as able  Plan: Progress treatment as tolerated         Diagnosis:    Precautions:    Manuals    PROM  All planes All planes      Mobs Shld Flexion MWM Shld Flexion MWM- RT, PT  Shld Flexion MWM- RT, PT   Held   IAS Anterior Shoulder               Exercise Diary        UBE        Pulleys  2" ea 2 mins ea 2 mins ea     SL ER  1#, 2x10  1#, 2x10   2# 3x10   Tband Rows/EXT Green 3x10 Green, 3x10  Green, 3x10  Black 3x10 Black 3x10   Tband ER/IR  Red, 3x10 ea Red, 2x10   Green 3x10   Cane Overhead x20 3x10   3x10   1# 2x10           Supine Punch 3x10 2#, 2x10  2#, 2x10      Scap Stab with towel on wall     x10 fwd/back & lat   Scap Stab with Tband     Red x5 2x5                           Review of HEP    RT, PT     Review of FOTO / Re-Evaluation       RT, PT                                               Modalities             CP PRN

## 2019-08-21 ENCOUNTER — OFFICE VISIT (OUTPATIENT)
Dept: PHYSICAL THERAPY | Facility: CLINIC | Age: 79
End: 2019-08-21
Payer: MEDICARE

## 2019-08-21 DIAGNOSIS — M75.42 IMPINGEMENT SYNDROME OF LEFT SHOULDER: ICD-10-CM

## 2019-08-21 DIAGNOSIS — M25.512 LEFT SHOULDER PAIN, UNSPECIFIED CHRONICITY: Primary | ICD-10-CM

## 2019-08-21 PROCEDURE — 97110 THERAPEUTIC EXERCISES: CPT

## 2019-08-21 PROCEDURE — 97112 NEUROMUSCULAR REEDUCATION: CPT

## 2019-08-21 NOTE — PROGRESS NOTES
Daily Note     Today's date: 2019  Patient name: Radha Kingsley  : 1940  MRN: [id number]  Referring provider: Ivy Lopez PA-C  Dx:   Encounter Diagnosis     ICD-10-CM    1  Left shoulder pain, unspecified chronicity M25 512    2  Impingement syndrome of left shoulder M75 42                   Subjective: Patient reports, "I am feeling pretty good  I don't have pain  I went to [place] [id number] yesterday and was able to do some light weights overhead  I just can't do heavy weights yet"  Objective: See treatment diary below      Assessment: Tolerated treatment well  Patient exhibited good technique with therapeutic exercises and would benefit from continued PT  Progressed shoulder strengthening this session, with focus on correct scapular mechanics  She continues to report difficulty with overhead lifting with increased weight  Progress strengthening per tolerance  Plan: Continue per plan of care        Diagnosis:    Precautions:    Manuals    PROM   All planes      Mobs Held   Shld Flexion MWM- RT, PT   Held   IASTM                Exercise Diary        UBE        Pulleys    2 mins ea     SL ER 2# 3x10  1#, 2x10   2# 3x10   Tband Rows/EXT Blue, 3x10   Green, 3x10  Black 3x10 Black 3x10   Tband ER/IR Green, 3x10   Red, 2x10   Green 3x10   Cane Overhead 1 5#, 2x10    3x10   1# 2x10           Supine Punch   2#, 2x10      Scap Stab with towel on wall x10 fwd/back & lat    x10 fwd/back & lat   Scap Stab with Tband  2x5 Red   Red x5 2x5   Shelf Reaches  4#, 10x        Single arm shoulder press 2#, 3x10                Review of HEP    RT, PT     Review of FOTO / Re-Evaluation     RT, PT                                         Modalities           CP PRN

## 2019-08-26 ENCOUNTER — OFFICE VISIT (OUTPATIENT)
Dept: PHYSICAL THERAPY | Facility: CLINIC | Age: 79
End: 2019-08-26
Payer: MEDICARE

## 2019-08-26 DIAGNOSIS — M75.42 IMPINGEMENT SYNDROME OF LEFT SHOULDER: ICD-10-CM

## 2019-08-26 DIAGNOSIS — M25.512 LEFT SHOULDER PAIN, UNSPECIFIED CHRONICITY: Primary | ICD-10-CM

## 2019-08-26 PROCEDURE — 97112 NEUROMUSCULAR REEDUCATION: CPT | Performed by: PHYSICAL THERAPIST

## 2019-08-26 PROCEDURE — 97110 THERAPEUTIC EXERCISES: CPT | Performed by: PHYSICAL THERAPIST

## 2019-08-26 NOTE — PROGRESS NOTES
Daily Note     Today's date: 2019  Patient name: Velia Neri  : 1940  MRN: [id number]  Referring provider: Kasie Vaz PA-C  Dx:   Encounter Diagnosis     ICD-10-CM    1  Left shoulder pain, unspecified chronicity M25 512    2  Impingement syndrome of left shoulder M75 42        Start Time: 0805  Stop Time: 4304  Total time in clinic (min): 39 minutes    Subjective:  "It's slowly feeling better"      Objective: See treatment diary below      Assessment: Tolerated treatment well  Patient would benefit from continued PT  Patient continues to report decreasing pain overall  She notes she is now able to do arm circles, but notes she has pain when doing abduction with any kind of weight  We will continue to focus on her overhead strength and reduction of pain as able  Plan: Progress treatment as tolerated         Diagnosis:    Precautions:    Manuals    PROM   All planes      Mobs Held   Shld Flexion MWM- RT, PT   Held   IASTM                Exercise Diary        UBE  2/2      Pulleys    2 mins ea     SL ER 2# 3x10 3# 3x10 1#, 2x10   2# 3x10   Tband Rows/EXT Blue, 3x10  Black 3x10 Green, 3x10  Black 3x10 Black 3x10   Tband ER/IR Green, 3x10  Blue 2x10 Red, 2x10   Green 3x10   Cane Overhead 1 5#, 2x10    3x10   1# 2x10           Supine Punch  Bench Press 4# 3x10 / 4# 2x10 2#, 2x10      Scap Stab with towel on wall x10 fwd/back & lat x10 fwd/back & lat   x10 fwd/back & lat   Scap Stab with Tband  2x5 Red 3x5 Red  Red x5 2x5   Shelf Reaches  4#, 10x        Single arm shoulder press 2#, 3x10                Review of HEP    RT, PT     Review of FOTO / Re-Evaluation     RT, PT                                         Modalities           CP PRN

## 2019-08-28 ENCOUNTER — OFFICE VISIT (OUTPATIENT)
Dept: PHYSICAL THERAPY | Facility: CLINIC | Age: 79
End: 2019-08-28
Payer: MEDICARE

## 2019-08-28 DIAGNOSIS — M25.512 LEFT SHOULDER PAIN, UNSPECIFIED CHRONICITY: Primary | ICD-10-CM

## 2019-08-28 DIAGNOSIS — M75.42 IMPINGEMENT SYNDROME OF LEFT SHOULDER: ICD-10-CM

## 2019-08-28 PROCEDURE — 97112 NEUROMUSCULAR REEDUCATION: CPT

## 2019-08-28 PROCEDURE — 97110 THERAPEUTIC EXERCISES: CPT

## 2019-08-28 NOTE — PROGRESS NOTES
Daily Note     Today's date: 2019  Patient name: Cari Syed  : 1940  MRN: [id number]  Referring provider: Edilson Mcmahon PA-C  Dx:   Encounter Diagnosis     ICD-10-CM    1  Left shoulder pain, unspecified chronicity M25 512    2  Impingement syndrome of left shoulder M75 42                   Subjective: Patient reports, 'I feel excellent  I was able to do the 4lbs overhead at [facility] yesterday"  Objective: See treatment diary below      Assessment: Tolerated treatment well  Patient exhibited good technique with therapeutic exercises  Continued with outlined program, progressing exercises as tolerated  She was able to perform resisted abduction and return to gym tasks without pain  Cuing needed for correct mechanics with serratus punches  She is progressing very nicely towards goals  Plan: Continue per plan of care        Diagnosis:    Precautions:    Manuals    PROM        Mobs Held     Held   [facility]                Exercise Diary        UBE  2/2 2/     Pulleys         SL ER 2# 3x10 3# 3x10 4#, 3x10   2# 3x10   Tband Rows/EXT Blue, 3x10  Black 3x10 [place]: 14#, 3x10   Black 3x10   Tband ER/IR Green, 3x10  Blue 2x10 Blue 3x10  Green 3x10   Cane Overhead 1 5#, 2x10     1# 2x10           Supine Punch  Bench Press 4# 3x10 / 4# 2x10 Bench Press 4# 3x10 / 4# 2x10     Scap Stab with towel on wall x10 fwd/back & lat x10 fwd/back & lat x10 fwd/back & lat  x10 fwd/back & lat   Scap Stab with Tband  2x5 Red 3x5 Red Green, 2x5   2x5   Shelf Reaches  4#, 10x        Single arm shoulder press 2#, 3x10        Overhead shoulder circles    [place] med ball, 10x e[address]      Return to gym routine    5 mins                      Review of HEP         Review of FOTO / Re-Evaluation                                         Modalities           CP PRN

## 2019-09-03 ENCOUNTER — OFFICE VISIT (OUTPATIENT)
Dept: PHYSICAL THERAPY | Facility: CLINIC | Age: 79
End: 2019-09-03
Payer: MEDICARE

## 2019-09-03 DIAGNOSIS — M75.42 IMPINGEMENT SYNDROME OF LEFT SHOULDER: ICD-10-CM

## 2019-09-03 DIAGNOSIS — M25.512 LEFT SHOULDER PAIN, UNSPECIFIED CHRONICITY: Primary | ICD-10-CM

## 2019-09-03 PROCEDURE — 97110 THERAPEUTIC EXERCISES: CPT

## 2019-09-03 PROCEDURE — 97112 NEUROMUSCULAR REEDUCATION: CPT

## 2019-09-03 NOTE — PROGRESS NOTES
Daily Note     Today's date: 9/3/2019  Patient name: Yolie Manning  : 1940  MRN: [id number]  Referring provider: Alicia Oliveira PA-C  Dx:   Encounter Diagnosis     ICD-10-CM    1  Left shoulder pain, unspecified chronicity M25 512    2  Impingement syndrome of left shoulder M75 42                   Subjective: Patient reports, 'I am feeling good  I want to go to [employer] and workout after this"  Objective: See treatment diary below      Assessment: Tolerated treatment well  Patient exhibited good technique with therapeutic exercises  Performed outlined program without pain noted  Cuing needed to decrease compensation with sidelying ER  Muscular fatigue achieved with resisted exercises  She demonstrates good understanding of TE  She is progressing very nicely towards goals  Plan: Continue per plan of care  Potential discharge next visit       Diagnosis:    Precautions:    Manuals 8/21 8/26 8/28 9/3    PROM        Mobs Held        IAS                Exercise Diary        UBE      Pulleys         SL ER 2# 3x10 3# 3x10 4#, 3x10  4#, 3x10     Tband Rows/EXT Blue, 3x10  Black 3x10 [place]: 14#, 3x10  [place]: 14#, 3x10     Tband ER/IR Green, 3x10  Blue 2x10 Blue 3x10 Black, 3x10     Cane Overhead 1 5#, 2x10                Supine Punch  Bench Press 4# 3x10 / 4# 2x10 Bench Press 4# 3x10 / 4# 2x10 Bench Press 4# 3x10 / 4# 3x10    Scap Stab with towel on wall x10 fwd/back & lat x10 fwd/back & lat x10 fwd/back & lat x15 fwd/back & lat    Scap Stab with Tband  2x5 Red 3x5 Red Green, 2x5  Green, 2x5     Shelf Reaches  4#, 10x        Single arm shoulder press 2#, 3x10        Overhead shoulder circles    [place] med ball, 10x ea way      Return to gym routine    5 mins      Doorway stretch     3x30 sec             Review of HEP         Review of FOTO / Re-Evaluation                                     Modalities          CP PRN

## 2019-09-06 ENCOUNTER — EVALUATION (OUTPATIENT)
Dept: PHYSICAL THERAPY | Facility: CLINIC | Age: 79
End: 2019-09-06
Payer: MEDICARE

## 2019-09-06 DIAGNOSIS — M25.512 LEFT SHOULDER PAIN, UNSPECIFIED CHRONICITY: Primary | ICD-10-CM

## 2019-09-06 DIAGNOSIS — M75.42 IMPINGEMENT SYNDROME OF LEFT SHOULDER: ICD-10-CM

## 2019-09-06 PROCEDURE — 97112 NEUROMUSCULAR REEDUCATION: CPT | Performed by: PHYSICAL THERAPIST

## 2019-09-06 NOTE — PROGRESS NOTES
PT Discharge    Today's date: 2019  Patient name: William Cage  : 1940  MRN: [id number]  Referring provider: Jasiel Fair PA-C  Dx:   Encounter Diagnosis     ICD-10-CM    1  Left shoulder pain, unspecified chronicity M25 512    2  Impingement syndrome of left shoulder M75 42        Start Time:   Stop Time: 09  Total time in clinic (min): 26 minutes    Assessment  Assessment details: William Cage is a 70 y o  female who has been consistent with attending and participating in skilled physical therapy sessions  The patient at this time reports that she has no pain and has returned to her prior activity level  She has demonstrated increased ROM and strength in her left shoulder  She has met all of her goals set at initial evaluation and has demonstrated independence with her HEP  If Tonja Obando were to need physical therapy in the future, we would be happy to share in her care  Impairments: poor posture   Understanding of Dx/Px/POC: good   Prognosis: good    Goals  Impairment Goals:  1  Patient will decrease complaints of pain by 50% at worst in 6 weeks - MET  2  Patient will increase ROM L shoulder = R shoulder in 6 weeks - MET  3  Patient will increase strength by 1/2 grade in 6 weeks - MET    Functional Goals:  1  Patient will be independent with HEP by discharge - MET  2  Patient will increase FOTO to average norms by discharge - MET  3  Patient will be able to lift an overhead shelf with her left arm with decreased pain in 6 weeks - MET  4  Patient will return to her normal gym routine by discharge - MET    Plan  Planned therapy interventions: home exercise program, therapeutic exercise, patient education and neuromuscular re-education  Treatment plan discussed with: patient        Subjective Evaluation    History of Present Illness  Mechanism of injury: Patient reports that she has improved "100%"    The patient notes that she has improved with her motion, putting weight on the arm, and lifting  The patient notes that she is fully back to her workout routines without any limitations  She reports that she is fully back to her prior functional level  HPI:  The patient reports having left shoulder pain beginning 2 years ago  She notes her pain started after getting a pneumonia shot in her left arm  She reports she went for 1 PT visit and as given a HEP  She notes her pain has not gotten any better  She visited ortho, received a cortisone injection, and was referred to physical therapy      Pain Location:  Anterior and Lateral Left Shoulder   Occupation:  Retired   Prior Functional Limitations:  Silver Sneakers 2x / week  AGG:  Lifting overhead, lifting weights overhead, reaching after being inactive   Ease:  Rest   Patient Goals:  "I want to be able to lift my arm"    Pain  No pain reported  Current pain ratin  At best pain ratin  At worst pain ratin          Objective     Active Range of Motion   Left Shoulder   Flexion: 155 degrees   Abduction: 160 degrees   Internal rotation BTB: mid thoracic     Right Shoulder   Flexion: 154 degrees   Abduction: 156 degrees   Internal rotation BTB: upper thoracic     Strength/Myotome Testing     Left Shoulder     Planes of Motion   Flexion: 5   Abduction: 4+   External rotation at 0°: 5   Internal rotation at 0°: 5     Right Shoulder   Normal muscle strength    Tests     Left Shoulder   Negative belly press, empty can, Hawkin's, lift-off and painful arc                Diagnosis:    Precautions:    Manuals 8/21 8/26 8/28 9/3 9/6   PROM        Mobs Held        [facility]                Exercise Diary        UBE  2/2 2/2 2/2    Pulleys         SL ER 2# 3x10 3# 3x10 4#, 3x10  4#, 3x10     Tband Rows/EXT Blue, 3x10  Black 3x10 Anthony: 14#, 3x10  Anthony: 14#, 3x10     Tband ER/IR Green, 3x10  Blue 2x10 Blue 3x10 Black, 3x10     Cane Overhead 1 5#, 2x10                Supine Punch  Bench Press 4# 3x10 / 4# 2x10 Bench Press 4# 3x10 / 4# 2x10 Bench Press 4# 3x10 / 4# 3x10    Scap Stab with towel on wall x10 fwd/back & lat x10 fwd/back & lat x10 fwd/back & lat x15 fwd/back & lat    Scap Stab with Tband  2x5 Red 3x5 Red Green, 2x5  Green, 2x5     Shelf Reaches  4#, 10x        Single arm shoulder press 2#, 3x10        Overhead shoulder circles    Bureau med ball, 10x ea way      Return to gym routine    5 mins      Doorway stretch     3x30 sec             Review of HEP         Review of FOTO / Re-Evaluation        Review of FOTO / Discharge     RT, PT                       Modalities          CP PRN

## 2019-10-02 ENCOUNTER — TRANSCRIBE ORDERS (OUTPATIENT)
Dept: PHYSICAL THERAPY | Facility: CLINIC | Age: 79
End: 2019-10-02

## 2019-10-02 ENCOUNTER — EVALUATION (OUTPATIENT)
Dept: PHYSICAL THERAPY | Facility: CLINIC | Age: 79
End: 2019-10-02
Payer: MEDICARE

## 2019-10-02 DIAGNOSIS — M54.16 LUMBAR RADICULOPATHY: ICD-10-CM

## 2019-10-02 DIAGNOSIS — M54.32 SCIATICA OF LEFT SIDE: Primary | ICD-10-CM

## 2019-10-02 PROCEDURE — 97162 PT EVAL MOD COMPLEX 30 MIN: CPT | Performed by: PHYSICAL THERAPIST

## 2019-10-02 PROCEDURE — 97110 THERAPEUTIC EXERCISES: CPT | Performed by: PHYSICAL THERAPIST

## 2019-10-02 NOTE — PROGRESS NOTES
PT Evaluation     Today's date: 10/2/2019  Patient name: Diaz Fernandez  : 1940  MRN: [id number]  Referring provider: Miri Jenkins MD  Dx:   Encounter Diagnosis     ICD-10-CM    1  Sciatica of left side M54 32    2  Lumbar radiculopathy M54 16        Start Time: 39  Stop Time: 935  Total time in clinic (min): 40 minutes    Assessment  Assessment details: Diaz Fernandez is a 70 y o  female who presents to the clinic with signs and symptoms consistent with a lumbar radiculopathy  The patient notes most of her pain is when she is seated for a prolonged period of time and when she goes to complete a sit to stand  The patient presents with the above listed impairments  She is eager to decrease her pain and should benefit from skilled physical therapy  Thank you for the referral       Impairments: abnormal muscle firing, abnormal or restricted ROM, activity intolerance, impaired physical strength, lacks appropriate home exercise program and pain with function  Understanding of Dx/Px/POC: good   Prognosis: good    Goals  Impairment Goals:  1  Patient will decrease complaints of pain by 50% at worst in 4 weeks  2  Patient will increase lumbar flexion ROM to full without pain in 4 weeks    Functional Goals:  1  Patient will be independent with HEP by discharge  2  Patient will increase FOTO to average norms by discharge  3  Patient will be able to sit for over 2+ hours with decreased pain in 4 weeks  4  Patient will be able to complete a sit to stand with decreased pain in 4 weeks  5    Patient will return to her prior level of function by discharge      Plan  Patient would benefit from: skilled physical therapy  Planned modality interventions: cryotherapy, TENS and thermotherapy: hydrocollator packs  Planned therapy interventions: abdominal trunk stabilization, flexibility, functional ROM exercises, graded activity, graded exercise, home exercise program, therapeutic exercise, therapeutic activities, stretching, strengthening, patient education, neuromuscular re-education, motor coordination training, muscle pump exercises, Mcmillan taping, massage, manual therapy and joint mobilization  Frequency: 2x week  Duration in weeks: 4  Treatment plan discussed with: patient        Subjective Evaluation    History of Present Illness  Mechanism of injury: HPI:  Patient reports that her back pain has started several months ago  She notes that walking and standing feels fine, but notes that most of her pain is when she is sitting and goes to complete a sit to stand  Patient reports that she had radicular symptoms into the left lower extremity, lateral shin  She went to see her PCP and was referred to physical therapy  Pain Location:  Occupation:  Retired  Prior Functional Limitations: Independent prior   AGG:  Sitting, sit to stand   Ease:  Standing, walking, Tylenol  Patient Goals:  "I want to be pain-free"      Pain  Current pain ratin  At best pain ratin  At worst pain ratin  Quality: "It's just a pain!"          Objective     Concurrent Complaints  Negative for night pain, disturbed sleep, bladder dysfunction, bowel dysfunction and saddle (S4) numbness    Neurological Testing     Sensation     Lumbar   Left   Intact: light touch    Right   Intact: light touch    Additional Neurological Details  Neurovascular Intact    Active Range of Motion     Additional Active Range of Motion Details  Lumbar Flexion - Full w/ minimal complaints of pain  Lumbar Extension - 50% w/ no complaints of pain     Strength/Myotome Testing     Lumbar   Left   Normal strength    Right   Normal strength    Muscle Activation   Patient able to activate left transverse abdominals and right transverse abdominals  Additional Muscle Activation Details  Moderate cueing needed for activation of transverse abdominals     Tests     Lumbar     Left   Positive passive SLR     Negative crossed SLR and slump test      Right   Negative crossed SLR, passive SLR and slump test        Flowsheet Rows      Most Recent Value   PT/OT G-Codes   Current Score  65   Projected Score  75              Diagnosis:    Precautions:    Manuals 10/2       PROM        Mobs                        Exercise Diary        Prone on Elbows 10" x 5       Lumbar Ext   5x       TrA Sets        Pirifomris S        Multifidus Press                                                                                Review of HEP RT, PT                                                      Modalities             CP PRN

## 2019-10-02 NOTE — LETTER
2019    Xiomara [address]  [address]  [facility]    Patient: Rashel Hartman   YOB: 1940   Date of Visit: 10/2/2019     Encounter Diagnosis     ICD-10-CM    1  Sciatica of left side M54 32    2  Lumbar radiculopathy M54 16        Dear Dr Shira Serna:    Thank you for your recent referral of Rashel Hartman  Please review the attached evaluation summary from Bharati's recent visit  Please verify that you agree with the plan of care by signing the attached order  If you have any questions or concerns, please do not hesitate to call  I sincerely appreciate the opportunity to share in the care of one of your patients and hope to have another opportunity to work with you in the near future  Sincerely,    France Panda, PT      Referring Provider:      I certify that I have read the below Plan of Care and certify the need for these services furnished under this plan of treatment while under my care  Xiomara [address]orm[address]  [address]  [address]: [phone]          PT Evaluation     Today's date: 10/2/2019  Patient name: Rashel Hartman  : 1940  MRN: [id number]  Referring provider: Jeronimo Jamil MD  Dx:   Encounter Diagnosis     ICD-10-CM    1  Sciatica of left side M54 32    2  Lumbar radiculopathy M54 16        Start Time: 7148  Stop Time: 0935  Total time in clinic (min): 40 minutes    Assessment  Assessment details: Rashel Hartman is a 70 y o  female who presents to the clinic with signs and symptoms consistent with a lumbar radiculopathy  The patient notes most of her pain is when she is seated for a prolonged period of time and when she goes to complete a sit to stand  The patient presents with the above listed impairments  She is eager to decrease her pain and should benefit from skilled physical therapy    Thank you for the referral       Impairments: abnormal muscle firing, abnormal or restricted ROM, activity intolerance, impaired physical strength, lacks appropriate home exercise program and pain with function  Understanding of Dx/Px/POC: good   Prognosis: good    Goals  Impairment Goals:  1  Patient will decrease complaints of pain by 50% at worst in 4 weeks  2  Patient will increase lumbar flexion ROM to full without pain in 4 weeks    Functional Goals:  1  Patient will be independent with HEP by discharge  2  Patient will increase FOTO to average norms by discharge  3  Patient will be able to sit for over 2+ hours with decreased pain in 4 weeks  4  Patient will be able to complete a sit to stand with decreased pain in 4 weeks  5  Patient will return to her prior level of function by discharge      Plan  Patient would benefit from: skilled physical therapy  Planned modality interventions: cryotherapy, TENS and thermotherapy: hydrocollator packs  Planned therapy interventions: abdominal trunk stabilization, flexibility, functional ROM exercises, graded activity, graded exercise, home exercise program, therapeutic exercise, therapeutic activities, stretching, strengthening, patient education, neuromuscular re-education, motor coordination training, muscle pump exercises, Mcmillan taping, massage, manual therapy and joint mobilization  Frequency: 2x week  Duration in weeks: 4  Treatment plan discussed with: patient        Subjective Evaluation    History of Present Illness  Mechanism of injury: HPI:  Patient reports that her back pain has started several months ago  She notes that walking and standing feels fine, but notes that most of her pain is when she is sitting and goes to complete a sit to stand  Patient reports that she had radicular symptoms into the left lower extremity, lateral shin  She went to see her PCP and was referred to physical therapy  Pain Location:  Occupation:  Retired  Prior Functional Limitations:   Independent prior   AGG:  Sitting, sit to stand   Ease:  Standing, walking, Tylenol  Patient Goals:  "I want to be pain-free"      Pain  Current pain ratin  At best pain ratin  At worst pain ratin  Quality: "It's just a pain!"          Objective     Concurrent Complaints  Negative for night pain, disturbed sleep, bladder dysfunction, bowel dysfunction and saddle (S4) numbness    Neurological Testing     Sensation     Lumbar   Left   Intact: light touch    Right   Intact: light touch    Additional Neurological Details  Neurovascular Intact    Active Range of Motion     Additional Active Range of Motion Details  Lumbar Flexion - Full w/ minimal complaints of pain  Lumbar Extension - 50% w/ no complaints of pain     Strength/Myotome Testing     Lumbar   Left   Normal strength    Right   Normal strength    Muscle Activation   Patient able to activate left transverse abdominals and right transverse abdominals  Additional Muscle Activation Details  Moderate cueing needed for activation of transverse abdominals     Tests     Lumbar     Left   Positive passive SLR  Negative crossed SLR and slump test      Right   Negative crossed SLR, passive SLR and slump test        Flowsheet Rows      Most Recent Value   PT/OT G-Codes   Current Score  65   Projected Score  75              Diagnosis:    Precautions:    Manuals 10/2       PROM        Mobs                        Exercise Diary        Prone on Elbows 10" x 5       Lumbar Ext   5x       TrA Sets        Pirifomris S        Multifidus Press                                                                                Review of HEP RT, PT                                                      Modalities             CP PRN

## 2019-10-03 ENCOUNTER — OFFICE VISIT (OUTPATIENT)
Dept: PHYSICAL THERAPY | Facility: CLINIC | Age: 79
End: 2019-10-03
Payer: MEDICARE

## 2019-10-03 DIAGNOSIS — M54.16 LUMBAR RADICULOPATHY: ICD-10-CM

## 2019-10-03 DIAGNOSIS — M54.32 SCIATICA OF LEFT SIDE: Primary | ICD-10-CM

## 2019-10-03 PROCEDURE — 97140 MANUAL THERAPY 1/> REGIONS: CPT

## 2019-10-03 PROCEDURE — 97110 THERAPEUTIC EXERCISES: CPT

## 2019-10-03 PROCEDURE — 97112 NEUROMUSCULAR REEDUCATION: CPT

## 2019-10-03 NOTE — PROGRESS NOTES
Daily Note     Today's date: 10/3/2019  Patient name: Tyrone Ramos  : 1940  MRN: [id number]  Referring provider: Syed Cisse MD  Dx:   Encounter Diagnosis     ICD-10-CM    1  Sciatica of left side M54 32    2  Lumbar radiculopathy M54 16                   Subjective: Patient reports that she has left gluteus pain when sitting  She reports that when she is walking, standing or lying prone, she does not have pain  Pain rated as "5/10" today with sitting  Objective: See treatment diary below      Assessment: Tolerated treatment well  Patient exhibited good technique with therapeutic exercises and would benefit from continued PT  Initiated exercises this session without increased pain  She noted mild lumbar discomfort with increased reps with MING and slight discomfort with piriformis stretch  Minimal improvement of glute pain with manual therapy  Updated patient HEP; demonstrates understanding  Plan: Continue per plan of care  Diagnosis:    Precautions:    Manuals 10/2 10/3      PROM        Mobs        Glute release  + STM RO, PTA _              Exercise Diary        Prone on Elbows 10" x 5 10x 10 sec       Lumbar Ext   5x       TrA Sets   with ball: 10x 5 sec       Pirifomris S  3x 30 sec       Multifidus Press  Green, 15x ea      Prone Glute sets   10x 5 sec      Bridges  2x10                                                               Review of HEP RT, PT                                                      Modalities             CP PRN

## 2019-10-07 ENCOUNTER — HOSPITAL ENCOUNTER (OUTPATIENT)
Dept: RADIOLOGY | Age: 79
Discharge: HOME | End: 2019-10-07
Payer: MEDICARE

## 2019-10-07 ENCOUNTER — OFFICE VISIT (OUTPATIENT)
Dept: PHYSICAL THERAPY | Facility: CLINIC | Age: 79
End: 2019-10-07
Payer: MEDICARE

## 2019-10-07 VITALS — HEIGHT: 64 IN | WEIGHT: 196 LBS | BODY MASS INDEX: 33.46 KG/M2

## 2019-10-07 DIAGNOSIS — Z12.31 SCREENING MAMMOGRAM, ENCOUNTER FOR: ICD-10-CM

## 2019-10-07 DIAGNOSIS — M54.16 LUMBAR RADICULOPATHY: ICD-10-CM

## 2019-10-07 DIAGNOSIS — M54.32 SCIATICA OF LEFT SIDE: Primary | ICD-10-CM

## 2019-10-07 PROCEDURE — 97112 NEUROMUSCULAR REEDUCATION: CPT

## 2019-10-07 PROCEDURE — 77063 BREAST TOMOSYNTHESIS BI: CPT

## 2019-10-07 PROCEDURE — 77067 SCR MAMMO BI INCL CAD: CPT

## 2019-10-07 PROCEDURE — 97110 THERAPEUTIC EXERCISES: CPT

## 2019-10-07 NOTE — PROGRESS NOTES
Daily Note     Today's date: 10/7/2019  Patient name: Madiha Sexton  : 1940  MRN: [id number]  Referring provider: Sandy Scheuermann, MD  Dx:   Encounter Diagnosis     ICD-10-CM    1  Sciatica of left side M54 32    2  Lumbar radiculopathy M54 16                   Subjective: Patient reports, "I don't have any pain, but I haven't been sitting a lot today"  Objective: See treatment diary below      Assessment: Tolerated treatment well  Patient exhibited good technique with therapeutic exercises and would benefit from continued PT  Held manuals as patient was pain free throughout the session  She was able to perform all given tasks without discomfort  Progress patient as able  Plan: Continue per plan of care  Diagnosis:    Precautions:    Manuals 10/2 10/3 10/7      PROM        Mobs        Glute release  + STM RO, PTA _ Held manuals              Exercise Diary        Prone on Elbows 10" x 5 10x 10 sec  10x 10 sec      Lumbar Ext   5x       TrA Sets   with ball: 10x 5 sec  with ball: 15x 5 sec      Piriformis S  3x 30 sec  3x 30 sec      Multifidus Press  Green, 15x ea Green, 20x ea     Prone Glute sets   10x 5 sec 10x 5 sec      Bridges  2x10  2x10      Seated Nerve Glides    2x10      Lacrosse ball on the wall    30 sec                                              Review of HEP RT, PT                                                      Modalities             CP PRN

## 2019-10-09 ENCOUNTER — OFFICE VISIT (OUTPATIENT)
Dept: PHYSICAL THERAPY | Facility: CLINIC | Age: 79
End: 2019-10-09
Payer: MEDICARE

## 2019-10-09 DIAGNOSIS — M54.16 LUMBAR RADICULOPATHY: ICD-10-CM

## 2019-10-09 DIAGNOSIS — M54.32 SCIATICA OF LEFT SIDE: Primary | ICD-10-CM

## 2019-10-09 PROCEDURE — 97110 THERAPEUTIC EXERCISES: CPT

## 2019-10-09 PROCEDURE — 97140 MANUAL THERAPY 1/> REGIONS: CPT

## 2019-10-09 NOTE — PROGRESS NOTES
Daily Note     Today's date: 10/9/2019  Patient name: Kemar Gordon  : 1940  MRN: [id number]  Referring provider: Rodney Huerta MD  Dx:   Encounter Diagnosis     ICD-10-CM    1  Sciatica of left side M54 32    2  Lumbar radiculopathy M54 16                   Subjective: Patient denies pain currently  She reports, 'Its just so sporadic, but its less intense  I think I need to find a tennis ball"  Objective: See treatment diary below      Assessment: Tolerated treatment well  Patient would benefit from continued PT  Patient was able to perform all tasks pain free, and demonstrates good response to manual stretching  Full program was not completed this session secondary to patient's schedule  Will continue to progress patient as able  Plan: Continue per plan of care  Diagnosis:    Precautions:    Manuals 10/2 10/3 10/7  10/9     PROM    Hamstring stretch     Mobs        Glute release  + STM RO, PTA _ Held manuals              Exercise Diary        Prone on Elbows 10" x 5 10x 10 sec  10x 10 sec  10x 10 sec     Lumbar Ext   5x       TrA Sets   with ball: 10x 5 sec  with ball: 15x 5 sec      Piriformis S  3x 30 sec  3x 30 sec  3x 30 sec     Multifidus Press  Green, 15x ea Green, 20x ea Green, 20x ea    Prone Glute sets   10x 5 sec 10x 5 sec      Bridges  2x10  2x10  3x10     Seated Nerve Glides    2x10      Lacrosse ball on the wall    30 sec      Hamstring stretch      manual                                     Review of HEP RT, PT                                                      Modalities             CP PRN

## 2019-10-10 ENCOUNTER — CONSULT (OUTPATIENT)
Dept: SURGICAL ONCOLOGY | Facility: CLINIC | Age: 79
End: 2019-10-10
Payer: MEDICARE

## 2019-10-10 VITALS
HEART RATE: 54 BPM | TEMPERATURE: 97.6 F | BODY MASS INDEX: 34.15 KG/M2 | RESPIRATION RATE: 18 BRPM | HEIGHT: 64 IN | SYSTOLIC BLOOD PRESSURE: 130 MMHG | WEIGHT: 200 LBS | DIASTOLIC BLOOD PRESSURE: 70 MMHG

## 2019-10-10 DIAGNOSIS — N60.19 FIBROCYSTIC BREAST CHANGES, UNSPECIFIED LATERALITY: Primary | ICD-10-CM

## 2019-10-10 PROCEDURE — 99203 OFFICE O/P NEW LOW 30 MIN: CPT | Performed by: SURGERY

## 2019-10-10 NOTE — PROGRESS NOTES
Breast Consultation-Surgical Oncology     [address] [facility],[address]  [facility]  [address] [address]    Name:  Adrianne Modi  YOB: 1940  MRN:  [id number]    Assessment/Plan   Diagnoses and all orders for this visit:    Fibrocystic breast changes, unspecified laterality            HPI: Adrianne Modi is a 70y o  year old female who presents with concerns for breast follow up  Pt has a history of fibrocystic changes and previous breast biopsies  Currently, pt denies any breast lumps,nipple discharge or skin changes  She denies any family history of breast cancer  Surgical treatment to date consisted of not applicable  Oncology History:     No history exists         Pertinent reproductive history:  Age at menarche:  17  OB History        3    Para   3    Term   3            AB        Living           SAB        TAB        Ectopic        Multiple        Live Births               Obstetric Comments   Menarche : 17   Age at first childbirth : 36  Hx of Hormone replacement            Age at first live birth:  36    Hysterectomy/Oophrectomy:  No  Hormone replacement therapy:  Yes  Birth control pills:  No    Problem List:   Patient Active Problem List   Diagnosis    Acute DVT of left tibial vein (HCC)    Essential hypertension    Left shoulder pain    Impingement syndrome of left shoulder     Past Medical History:   Diagnosis Date    Acid reflux     [facility]    Hearing loss     Hypercholesterolemia     Hypertension     Wears glasses      Past Surgical History:   Procedure Laterality Date    BREAST BIOPSY       left,  unknown side    TONSILLECTOMY      TUBAL LIGATION      WISDOM TOOTH EXTRACTION       Family History   Problem Relation Age of Onset    Diabetes Mother     Hypertension Mother     Cancer Father     Diabetes Father     Hypertension Father     Colon cancer Father 72    Congenital heart disease Daughter     Hyperlipidemia Other         pure    Lymphoma Sister 72    Lung cancer Sister 62    Skin cancer Brother 72        Melanoma    No Known Problems Maternal Grandmother     No Known Problems Maternal Grandfather     No Known Problems Paternal Grandmother     No Known Problems Paternal Grandfather     Prostate cancer Brother 72    No Known Problems Daughter      Social History     Socioeconomic History    Marital status: /Civil Union     Spouse name: Not on file    Number of children: Not on file    Years of education: Not on file    Highest education level: Not on file   Occupational History    Not on file   Social Needs    Financial resource strain: Not on file    Food insecurity:     Worry: Not on file     Inability: Not on file    Transportation needs:     Medical: Not on file     Non-medical: Not on file   Tobacco Use    Smoking status: Never Smoker    Smokeless tobacco: Never Used   Substance and Sexual Activity    Alcohol use: Yes     Frequency: Monthly or less    Drug use: No    Sexual activity: Not on file   Lifestyle    Physical activity:     Days per week: Not on file     Minutes per session: Not on file    Stress: Not on file   Relationships    Social connections:     Talks on phone: Not on file     Gets together: Not on file     Attends Nondenominational service: Not on file     Active member of club or organization: Not on file     Attends meetings of clubs or organizations: Not on file     Relationship status: Not on file    Intimate partner violence:     Fear of current or ex partner: Not on file     Emotionally abused: Not on file     Physically abused: Not on file     Forced sexual activity: Not on file   Other Topics Concern    Not on file   Social History Narrative    Not on file     Current Outpatient Medications   Medication Sig Dispense Refill    amLODIPine (NORVASC) 5 mg tablet Take 10 mg by mouth daily       Ascorbic Acid (VITAMIN C) 500 MG CAPS Take 1 capsule by mouth daily      Calcium Carbonate-Vitamin D3 (CALCIUM 600/VITAMIN D) 600-400 MG-UNIT TABS Take by mouth      Cholecalciferol (VITAMIN D) 2000 units tablet Take by mouth daily      folic acid (FOLVITE) 171 MCG tablet Take 1 tablet by mouth daily      Glucosamine HCl 1500 MG TABS Take 1,500 mg by mouth      hydrochlorothiazide (HYDRODIURIL) 25 mg tablet Take 1 tablet by mouth daily      lisinopril (ZESTRIL) 40 mg tablet Take 1 tablet (40 mg total) by mouth daily 90 tablet 3    Omega-3 1000 MG CAPS Take by mouth daily      potassium chloride (MICRO-K) 10 MEQ CR capsule Take 1 capsule by mouth daily      ranitidine (ZANTAC) 150 mg tablet Take 150 mg by mouth 2 (two) times a day  3    simvastatin (ZOCOR) 20 mg tablet Take 1 tablet by mouth daily      pyridoxine (VITAMIN B6) 100 mg tablet Take 1 tablet by mouth daily      vitamin B-12 (CYANOCOBALAMIN) 50 MCG tablet Take 1 tablet by mouth daily       No current facility-administered medications for this visit  Allergies   Allergen Reactions    Nebivolol Bradycardia    Sulfa Antibiotics Other (See Comments)     Unknown reaction     Eliquis [Apixaban] Rash    Motrin [Ibuprofen] GI Intolerance     If on prescription strength, over the counter she does fine with         The following portions of the patient's history were reviewed and updated as appropriate: allergies, current medications, past family history, past medical history, past social history, past surgical history and problem list     Review of Systems:  Review of Systems   Constitutional: Negative  Negative for appetite change and fever  HENT: Positive for hearing loss  Eyes: Negative  Respiratory: Negative for shortness of breath  Cardiovascular: Negative  Gastrointestinal: Negative  Endocrine: Negative  Genitourinary: Negative  Musculoskeletal: Negative  Negative for arthralgias and myalgias  Skin: Negative  Allergic/Immunologic: Negative  Neurological: Negative  Hematological: Negative  Negative for adenopathy  Does not bruise/bleed easily  Psychiatric/Behavioral: Negative  Physical Exam:  Physical Exam   Constitutional: She is oriented to person, place, and time  She appears well-developed and well-nourished  HENT:   Head: Normocephalic and atraumatic  Pulmonary/Chest: Right breast exhibits no inverted nipple, no mass, no nipple discharge, no skin change and no tenderness  Left breast exhibits no inverted nipple, no mass, no nipple discharge, no skin change and no tenderness  Lymphadenopathy:        Right axillary: No pectoral and no lateral adenopathy present  Left axillary: No pectoral and no lateral adenopathy present  Right: No supraclavicular adenopathy present  Left: No supraclavicular adenopathy present  Neurological: She is alert and oriented to person, place, and time  Psychiatric: She has a normal mood and affect  Imaging:  10/07/19  3D bilateral screening mammogram B1 (1)            Discussion/Summary:  61-year-old female here today secondary to her history of fibrocystic changes  She reports two prior benign biopsies  She denies any family history of breast cancer  There are no concerns on examination today  Her recent mammogram was benign  She is already scheduled for next years mammogram   I will therefore see her again in one year or sooner should the need arise

## 2019-10-11 ENCOUNTER — OFFICE VISIT (OUTPATIENT)
Dept: OBGYN CLINIC | Facility: MEDICAL CENTER | Age: 79
End: 2019-10-11
Payer: MEDICARE

## 2019-10-11 VITALS
RESPIRATION RATE: 20 BRPM | HEART RATE: 47 BPM | BODY MASS INDEX: 34.04 KG/M2 | WEIGHT: 199.4 LBS | HEIGHT: 64 IN | SYSTOLIC BLOOD PRESSURE: 154 MMHG | DIASTOLIC BLOOD PRESSURE: 84 MMHG

## 2019-10-11 DIAGNOSIS — M75.42 IMPINGEMENT SYNDROME OF LEFT SHOULDER: Primary | ICD-10-CM

## 2019-10-11 DIAGNOSIS — G89.29 CHRONIC LEFT SHOULDER PAIN: ICD-10-CM

## 2019-10-11 DIAGNOSIS — M25.512 CHRONIC LEFT SHOULDER PAIN: ICD-10-CM

## 2019-10-11 PROCEDURE — 20610 DRAIN/INJ JOINT/BURSA W/O US: CPT | Performed by: ORTHOPAEDIC SURGERY

## 2019-10-11 PROCEDURE — 99213 OFFICE O/P EST LOW 20 MIN: CPT | Performed by: ORTHOPAEDIC SURGERY

## 2019-10-11 RX ORDER — BETAMETHASONE SODIUM PHOSPHATE AND BETAMETHASONE ACETATE 3; 3 MG/ML; MG/ML
12 INJECTION, SUSPENSION INTRA-ARTICULAR; INTRALESIONAL; INTRAMUSCULAR; SOFT TISSUE
Status: COMPLETED | OUTPATIENT
Start: 2019-10-11 | End: 2019-10-11

## 2019-10-11 RX ORDER — BUPIVACAINE HYDROCHLORIDE 2.5 MG/ML
2 INJECTION, SOLUTION INFILTRATION; PERINEURAL
Status: COMPLETED | OUTPATIENT
Start: 2019-10-11 | End: 2019-10-11

## 2019-10-11 RX ORDER — LIDOCAINE HYDROCHLORIDE 10 MG/ML
2 INJECTION, SOLUTION INFILTRATION; PERINEURAL
Status: COMPLETED | OUTPATIENT
Start: 2019-10-11 | End: 2019-10-11

## 2019-10-11 RX ADMIN — BUPIVACAINE HYDROCHLORIDE 2 ML: 2.5 INJECTION, SOLUTION INFILTRATION; PERINEURAL at 09:51

## 2019-10-11 RX ADMIN — LIDOCAINE HYDROCHLORIDE 2 ML: 10 INJECTION, SOLUTION INFILTRATION; PERINEURAL at 09:51

## 2019-10-11 RX ADMIN — BETAMETHASONE SODIUM PHOSPHATE AND BETAMETHASONE ACETATE 12 MG: 3; 3 INJECTION, SUSPENSION INTRA-ARTICULAR; INTRALESIONAL; INTRAMUSCULAR; SOFT TISSUE at 09:51

## 2019-10-11 NOTE — PROGRESS NOTES
Assessment:  1  Impingement syndrome of left shoulder     2  Chronic left shoulder pain         Plan:  The patient was provided with left subacromial steroid injection  She is encouraged to continue her HEP  She should follow up as needed  To do next visit:  Return if symptoms worsen or fail to improve  The above stated was discussed in layman's terms and the patient expressed understanding  All questions were answered to the patient's satisfaction  Scribe Attestation    I,:   Wisam Galvan am acting as a scribe while in the presence of the attending physician :        I,:   Malika Sahu MD personally performed the services described in this documentation    as scribed in my presence :              Subjective:   Gavi Bey is a 70 y o  female who presents for follow up of left shoulder  She is s/p left subacromial steroid injection, 7/12/19, with lasting benefit  She did complete physical therapy with benefit  Today she complains of generalized left shoulder pain  She rates her pain at 2/10  Putting on jacket aggravate  She is able to sleep on the left side without repercussion  She does her HEP and also a silver sneaker program   She rarely uses tylenol  She also complains of central sacrum pain evaluated by her PCP including current physical therapy          Review of systems negative unless otherwise specified in HPI    Past Medical History:   Diagnosis Date    Acid reflux     [facility]2018    Hearing loss     Hypercholesterolemia     Hypertension     Wears glasses        Past Surgical History:   Procedure Laterality Date    BREAST BIOPSY      1995 left, 1997 unknown side    TONSILLECTOMY      TUBAL LIGATION      WISDOM TOOTH EXTRACTION  1962       Family History   Problem Relation Age of Onset    Diabetes Mother     Hypertension Mother     Cancer Father     Diabetes Father     Hypertension Father     Colon cancer Father 72    Congenital heart disease Daughter     Hyperlipidemia Other         pure    Lymphoma Sister 72    Lung cancer Sister 62    Skin cancer Brother 72        Melanoma    No Known Problems Maternal Grandmother     No Known Problems Maternal Grandfather     No Known Problems Paternal Grandmother     No Known Problems Paternal Grandfather     Prostate cancer Brother 72    No Known Problems Daughter        Social History     Occupational History    Not on file   Tobacco Use    Smoking status: Never Smoker    Smokeless tobacco: Never Used   Substance and Sexual Activity    Alcohol use: Yes     Frequency: Monthly or less    Drug use: No    Sexual activity: Not on file         Current Outpatient Medications:     amLODIPine (NORVASC) 5 mg tablet, Take 10 mg by mouth daily , Disp: , Rfl:     Ascorbic Acid (VITAMIN C) 500 MG CAPS, Take 1 capsule by mouth daily, Disp: , Rfl:     Calcium Carbonate-Vitamin D3 (CALCIUM 600/VITAMIN D) 600-400 MG-UNIT TABS, Take by mouth, Disp: , Rfl:     Cholecalciferol (VITAMIN D) 2000 units tablet, Take by mouth daily, Disp: , Rfl:     folic acid (FOLVITE) 098 MCG tablet, Take 1 tablet by mouth daily, Disp: , Rfl:     Glucosamine HCl 1500 MG TABS, Take 1,500 mg by mouth, Disp: , Rfl:     hydrochlorothiazide (HYDRODIURIL) 25 mg tablet, Take 1 tablet by mouth daily, Disp: , Rfl:     lisinopril (ZESTRIL) 40 mg tablet, Take 1 tablet (40 mg total) by mouth daily, Disp: 90 tablet, Rfl: 3    Omega-3 1000 MG CAPS, Take by mouth daily, Disp: , Rfl:     potassium chloride (MICRO-K) 10 MEQ CR capsule, Take 1 capsule by mouth daily, Disp: , Rfl:     pyridoxine (VITAMIN B6) 100 mg tablet, Take 1 tablet by mouth daily, Disp: , Rfl:     ranitidine (ZANTAC) 150 mg tablet, Take 150 mg by mouth 2 (two) times a day as needed , Disp: , Rfl: 3    simvastatin (ZOCOR) 20 mg tablet, Take 1 tablet by mouth daily, Disp: , Rfl:     Allergies   Allergen Reactions    Nebivolol Bradycardia    Sulfa Antibiotics Other (See Comments)     Unknown reaction     Eliquis [Apixaban] Rash    Motrin [Ibuprofen] GI Intolerance     If on prescription strength, over the counter she does fine with            Vitals:    10/11/19 0935   BP: 154/84   Pulse: (!) 47   Resp:        Objective:  Physical exam  · General: Awake, Alert, Oriented  · Eyes: Pupils equal, round and reactive to light  · Heart: regular rate and rhythm  · Lungs: No audible wheezing  · Abdomen: soft                    Ortho Exam   Left shoulder:  Slight restriction of ROM in all planes  No pain with resisted ER  Sight weakness with empty can test, no pain  NVID      Diagnostics, reviewed and taken today if performed as documented:    None performed    Procedures, if performed today:    Large joint arthrocentesis: L subacromial bursa  Date/Time: 10/11/2019 9:51 AM  Consent given by: patient  Site marked: site marked  Timeout: Immediately prior to procedure a time out was called to verify the correct patient, procedure, equipment, support staff and site/side marked as required   Supporting Documentation  Indications: pain   Procedure Details  Location: shoulder - L subacromial bursa  Needle size: 25 G  Ultrasound guidance: no  Approach: posterolateral  Medications administered: 12 mg betamethasone acetate-betamethasone sodium phosphate 6 (3-3) mg/mL; 2 mL lidocaine 1 %; 2 mL bupivacaine 0 25 %    Patient tolerance: patient tolerated the procedure well with no immediate complications  Dressing:  Sterile dressing applied            Portions of the record may have been created with voice recognition software  Occasional wrong word or "sound a like" substitutions may have occurred due to the inherent limitations of voice recognition software  Read the chart carefully and recognize, using context, where substitutions have occurred

## 2019-10-14 ENCOUNTER — OFFICE VISIT (OUTPATIENT)
Dept: PHYSICAL THERAPY | Facility: CLINIC | Age: 79
End: 2019-10-14
Payer: MEDICARE

## 2019-10-14 DIAGNOSIS — M54.16 LUMBAR RADICULOPATHY: ICD-10-CM

## 2019-10-14 DIAGNOSIS — M54.32 SCIATICA OF LEFT SIDE: Primary | ICD-10-CM

## 2019-10-14 PROCEDURE — 97110 THERAPEUTIC EXERCISES: CPT

## 2019-10-14 NOTE — PROGRESS NOTES
Daily Note     Today's date: 10/14/2019  Patient name: Claire Amezquita  : 1940  MRN: [id number]  Referring provider: Asim Randall MD  Dx:   Encounter Diagnosis     ICD-10-CM    1  Sciatica of left side M54 32    2  Lumbar radiculopathy M54 16        Start Time: 930  Stop Time: 1010  Total time in clinic (min): 40 minutes    Subjective: Patient states, "I received a steroid injection to my L shoulder and since then my sciatic pain has diminished  I mean it's very little if any at all "    Objective: See treatment diary below    Assessment: Tolerated treatment well  Patient would benefit from continued PT  No pain t/o entire treatment session  She continues to respond well to PT  Patient will be on vacation for a week starting Wednesday  If she does well with a week off of PT consider DC  Plan: Continue per plan of care  Diagnosis:    Precautions:    Manuals 10/2 10/3 10/7  10/9  10/14   PROM    Hamstring stretch  HS stretch   Mobs        Glute release  + STM RO, PTA _ Held manuals              Exercise Diary        Prone on Elbows 10" x 5 10x 10 sec  10x 10 sec  10x 10 sec  10x 10 sec   Lumbar Ext   5x       TrA Sets   with ball: 10x 5 sec  with ball: 15x 5 sec      Piriformis S  3x 30 sec  3x 30 sec  3x 30 sec  3x 30 sec   Multifidus Press  Green, 15x ea Green, 20x ea Green, 20x ea Green 25x ea   Prone Glute sets   10x 5 sec 10x 5 sec      Bridges  2x10  2x10  3x10  3x10   Seated Nerve Glides    2x10      Lacrosse ball on the wall    30 sec      Hamstring stretch      manual                                     Review of HEP RT, PT                                                      Modalities             CP PRN

## 2019-10-16 ENCOUNTER — OFFICE VISIT (OUTPATIENT)
Dept: PHYSICAL THERAPY | Facility: CLINIC | Age: 79
End: 2019-10-16
Payer: MEDICARE

## 2019-10-16 DIAGNOSIS — M54.16 LUMBAR RADICULOPATHY: ICD-10-CM

## 2019-10-16 DIAGNOSIS — M54.32 SCIATICA OF LEFT SIDE: Primary | ICD-10-CM

## 2019-10-16 PROCEDURE — 97112 NEUROMUSCULAR REEDUCATION: CPT | Performed by: PHYSICAL THERAPIST

## 2019-10-16 PROCEDURE — 97110 THERAPEUTIC EXERCISES: CPT | Performed by: PHYSICAL THERAPIST

## 2019-10-16 NOTE — PROGRESS NOTES
Daily Note     Today's date: 10/16/2019  Patient name: Francisco Quach  : 1940  MRN: [id number]  Referring provider: Antonieta Ash MD  Dx:   Encounter Diagnosis     ICD-10-CM    1  Sciatica of left side M54 32    2  Lumbar radiculopathy M54 16        Start Time: 805  Stop Time: 08  Total time in clinic (min): 40 minutes    Subjective: "I'm still feeling pretty good  Right now it's still good, but sometimes I get a dull ache"      Objective: See treatment diary below      Assessment: Tolerated treatment well  Patient would benefit from continued PT  Patient with decreased complaints of pain overall  Tolerating all PREs well  Patient will be away on vacation for 1 week and will re-assess symptoms at that time  Plan: Progress treatment as tolerated  Diagnosis:    Precautions:    Manuals 10/16  10/7  10/9  10/14   PROM    Hamstring stretch  HS stretch   Mobs        Glute release   Held manuals              Exercise Diary        Prone on Elbows 10 x 10 sec  10x 10 sec  10x 10 sec  10x 10 sec   Lumbar Ext          TrA Sets With volleyball 10x 5 sec  with ball: 15x 5 sec      Piriformis S 3x 30 sec  3x 30 sec  3x 30 sec  3x 30 sec   Multifidus Press   Green, 20x ea Green, 20x ea Green 25x ea   Prone Glute sets    10x 5 sec      Bridges 3x10  2x10  3x10  3x10   Seated Nerve Glides    2x10      Lacrosse ball on the wall    30 sec      Hamstring stretch      manual     Side-Stepping Green 5 laps at railing        Hip Abduciton Standing Green x10                       Review of HEP                                                Modalities            CP PRN

## 2019-10-21 ENCOUNTER — OFFICE VISIT (OUTPATIENT)
Dept: PHYSICAL THERAPY | Facility: CLINIC | Age: 79
End: 2019-10-21
Payer: MEDICARE

## 2019-10-21 DIAGNOSIS — M54.32 SCIATICA OF LEFT SIDE: Primary | ICD-10-CM

## 2019-10-21 DIAGNOSIS — M54.16 LUMBAR RADICULOPATHY: ICD-10-CM

## 2019-10-21 PROCEDURE — 97110 THERAPEUTIC EXERCISES: CPT

## 2019-10-21 PROCEDURE — 97140 MANUAL THERAPY 1/> REGIONS: CPT

## 2019-10-21 NOTE — PROGRESS NOTES
Daily Note     Today's date: 10/21/2019  Patient name: Diaz Fernandez  : 1940  MRN: [id number]  Referring provider: Miri Jenkins MD  Dx:   Encounter Diagnosis     ICD-10-CM    1  Sciatica of left side M54 32    2  Lumbar radiculopathy M54 16                   Subjective: Patient reports, 'I am feeling a little sore today  My legs are so sore and I need you to fix me"  Objective: See treatment diary below      Assessment: Tolerated treatment well  Patient exhibited good technique with therapeutic exercises and would benefit from continued PT  Heavier emphasis on manual therapy this session secondary to muscular soreness  Decreased lower extremity soreness with manuals  She reported feeling better by the end of her session  Continue to progress as able  Plan: Continue per plan of care  Diagnosis:    Precautions:    Manuals 10/16 10/21  10/9  10/14   PROM  Hamstring, Gastroc, Quad bilateral   Hamstring stretch  HS stretch   Mobs        Glute release                Exercise Diary        Prone on Elbows 10 x 10 sec 10x 10 sec   10x 10 sec  10x 10 sec   Lumbar Ext          TrA Sets With volleyball 10x 5 sec With volleyball 10x 5 sec      Piriformis S 3x 30 sec   3x 30 sec  3x 30 sec   Multifidus Press    Green, 20x ea Green 25x ea   Prone Glute sets         Bridges 3x10   3x10  3x10   Seated Nerve Glides         Lacrosse ball on the wall         Hamstring stretch      manual     Side-Stepping Green 5 laps at railing        Hip Abduciton Standing Green x10       Prone Quad stretch   3x30 sec ea              Review of HEP                                                Modalities            CP PRN

## 2019-10-24 ENCOUNTER — OFFICE VISIT (OUTPATIENT)
Dept: PHYSICAL THERAPY | Facility: CLINIC | Age: 79
End: 2019-10-24
Payer: MEDICARE

## 2019-10-24 DIAGNOSIS — M54.32 SCIATICA OF LEFT SIDE: Primary | ICD-10-CM

## 2019-10-24 DIAGNOSIS — M54.16 LUMBAR RADICULOPATHY: ICD-10-CM

## 2019-10-24 PROCEDURE — 97140 MANUAL THERAPY 1/> REGIONS: CPT

## 2019-10-24 PROCEDURE — 97110 THERAPEUTIC EXERCISES: CPT

## 2019-10-24 NOTE — PROGRESS NOTES
Daily Note     Today's date: 10/24/2019  Patient name: Gavi Bey  : 1940  MRN: [id number]  Referring provider: Nilson Triana MD  Dx:   Encounter Diagnosis     ICD-10-CM    1  Sciatica of left side M54 32    2  Lumbar radiculopathy M54 16                   Subjective: Patient reports, "I was feeling better after last time  My back isn't bothering me today"  Objective: See treatment diary below      Assessment: Tolerated treatment well  Patient exhibited good technique with therapeutic exercises and would benefit from continued PT  Patient with continued complaints of quad and hamstring discomfort  Focused on stretching this session to improve muscular tightness  Decreased hamstring "pressure" with seated nerve glides  Patient was encouraged to add nerve glides to HEP  Assess response to stretching next session  Plan: Continue per plan of care  Diagnosis:    Precautions:    Manuals 10/16 10/21 10/24  10/14   PROM  Hamstring, Gastroc, Quad bilateral  Knee flexion at EOT   HS stretch   Mobs        Glute release                Exercise Diary        Prone on Elbows 10 x 10 sec 10x 10 sec  10x 10 sec   10x 10 sec   Lumbar Ext          TrA Sets With volleyball 10x 5 sec With volleyball 10x 5 sec With volleyball 10x 5 sec     Piriformis S 3x 30 sec    3x 30 sec   Multifidus Press     Green 25x ea   Prone Glute sets         Bridges 3x10  OSB: 2x0   3x10   Seated Nerve Glides    2x10      Lacrosse ball on the wall         Hamstring stretch    3x 30 sec      Side-Stepping Green 5 laps at railing        Hip Abduciton Standing Green x10       Prone Quad stretch   3x30 sec ea 4x 30 sec ea             Review of HEP                                            Modalities            CP PRN

## 2019-10-28 ENCOUNTER — OFFICE VISIT (OUTPATIENT)
Dept: PHYSICAL THERAPY | Facility: CLINIC | Age: 79
End: 2019-10-28
Payer: MEDICARE

## 2019-10-28 DIAGNOSIS — M54.32 SCIATICA OF LEFT SIDE: Primary | ICD-10-CM

## 2019-10-28 DIAGNOSIS — M54.16 LUMBAR RADICULOPATHY: ICD-10-CM

## 2019-10-28 PROCEDURE — 97112 NEUROMUSCULAR REEDUCATION: CPT

## 2019-10-28 PROCEDURE — 97110 THERAPEUTIC EXERCISES: CPT

## 2019-10-28 NOTE — PROGRESS NOTES
Daily Note     Today's date: 10/28/2019  Patient name: Jonathan Jensen  : 1940  MRN: [id number]  Referring provider: Stacy Lipscomb MD  Dx:   Encounter Diagnosis     ICD-10-CM    1  Sciatica of left side M54 32    2  Lumbar radiculopathy M54 16                   Subjective: Patient reports, "I am feeling good  My legs are feeling better "       Objective: See treatment diary below      Assessment: Tolerated treatment well  Patient exhibited good technique with therapeutic exercises  Patient was able to perform all given exercises today without pain or discomfort  Held manuals as she was pain free during session  Gauging patient for possible discharge  Patient will be re-evaluated at next visit  Plan: Continue per plan of care  Diagnosis:    Precautions:    Manuals 10/16 10/21 10/24 10/28     PROM  Hamstring, Gastroc, Quad bilateral  Knee flexion at EOT   Held     Mobs        Glute release                Exercise Diary        Prone on Elbows 10 x 10 sec 10x 10 sec  10x 10 sec      Lumbar Ext          TrA Sets With volleyball 10x 5 sec With volleyball 10x 5 sec With volleyball 10x 5 sec 3x10    Piriformis S 3x 30 sec       Multifidus Press        Prone Glute sets         Bridges 3x10  OSB: 2x0  OSB: 2x10    Seated Nerve Glides    2x10  10x ea    Lacrosse ball on the wall         Clamshells     Green, 2x10 ea    Hamstring stretch    3x 30 sec  3x30 sec    Side-Stepping Green 5 laps at [employer]        Hip Abduciton Standing Green x10       Prone Quad stretch   3x30 sec ea 4x 30 sec ea 3x 30 sec     Hamstring Curl    OSB 2x12    Review of HEP                                          Modalities            CP PRN

## 2019-10-31 ENCOUNTER — EVALUATION (OUTPATIENT)
Dept: PHYSICAL THERAPY | Facility: CLINIC | Age: 79
End: 2019-10-31
Payer: MEDICARE

## 2019-10-31 DIAGNOSIS — M54.16 LUMBAR RADICULOPATHY: ICD-10-CM

## 2019-10-31 DIAGNOSIS — M54.32 SCIATICA OF LEFT SIDE: Primary | ICD-10-CM

## 2019-10-31 PROCEDURE — 97110 THERAPEUTIC EXERCISES: CPT | Performed by: PHYSICAL THERAPIST

## 2019-10-31 PROCEDURE — 97112 NEUROMUSCULAR REEDUCATION: CPT | Performed by: PHYSICAL THERAPIST

## 2019-10-31 NOTE — PROGRESS NOTES
PT Discharge    Today's date: 10/31/2019  Patient name: Makead Rg  : 1940  MRN: [id number]  Referring provider: Rigo Castillo MD  Dx:   Encounter Diagnosis     ICD-10-CM    1  Sciatica of left side M54 32    2  Lumbar radiculopathy M54 16        Start Time: 0830  Stop Time: 0900  Total time in clinic (min): 30 minutes    Assessment  Assessment details: Makeda Rg is a 70 y o  female who presents to the clinic with signs and symptoms consistent with a lumbar radiculopathy  The patient notes most of her pain is when she is seated for a prolonged period of time and when she goes to complete a sit to stand  The patient presents with the above listed impairments  She is eager to decrease her pain and should benefit from skilled physical therapy  Thank you for the referral       Impairments: abnormal muscle firing, abnormal or restricted ROM, activity intolerance, impaired physical strength, lacks appropriate home exercise program and pain with function  Understanding of Dx/Px/POC: good   Prognosis: good    Goals  Impairment Goals:  1  Patient will decrease complaints of pain by 50% at worst in 4 weeks  2  Patient will increase lumbar flexion ROM to full without pain in 4 weeks    Functional Goals:  1  Patient will be independent with HEP by discharge  2  Patient will increase FOTO to average norms by discharge  3  Patient will be able to sit for over 2+ hours with decreased pain in 4 weeks  4  Patient will be able to complete a sit to stand with decreased pain in 4 weeks  5    Patient will return to her prior level of function by discharge      Plan  Patient would benefit from: skilled physical therapy  Planned modality interventions: cryotherapy, TENS and thermotherapy: hydrocollator packs  Planned therapy interventions: abdominal trunk stabilization, flexibility, functional ROM exercises, graded activity, graded exercise, home exercise program, therapeutic exercise, therapeutic activities, stretching, strengthening, patient education, neuromuscular re-education, motor coordination training, muscle pump exercises, Mcmillan taping, massage, manual therapy and joint mobilization  Frequency: 2x week  Duration in weeks: 4  Treatment plan discussed with: patient        Subjective Evaluation    History of Present Illness  Mechanism of injury: Patient reports that she has improved "100%" since starting physical therapy  She reports that her pain has improved and that she has been able to do more  The patient also reports that she does not have any issues with getting up and out of a chair  The patient does report that she is only ascending/descending stairs with a reciprocal gait pattern which is not her prior baseline  HPI:  Patient reports that her back pain has started several months ago  She notes that walking and standing feels fine, but notes that most of her pain is when she is sitting and goes to complete a sit to stand  Patient reports that she had radicular symptoms into the left lower extremity, lateral shin  She went to see her PCP and was referred to physical therapy  Pain Location:  Occupation:  Retired  Prior Functional Limitations:   Independent prior   AGG:  Sitting, sit to stand   Ease:  Standing, walking, Tylenol  Patient Goals:  "I want to be pain-free"      Pain  Current pain ratin  At best pain ratin  At worst pain ratin  Quality: "felt like it was swollen"          Objective     Concurrent Complaints  Negative for night pain, disturbed sleep, bladder dysfunction, bowel dysfunction and saddle (S4) numbness    Neurological Testing     Sensation     Lumbar   Left   Intact: light touch    Right   Intact: light touch    Additional Neurological Details  Neurovascular Intact    Active Range of Motion     Additional Active Range of Motion Details  Lumbar Flexion - Full w/ minimal complaints of pain  Lumbar Extension - Full% w/ no complaints of pain Strength/Myotome Testing     Lumbar   Left   Normal strength    Right   Normal strength    Muscle Activation   Patient able to activate left transverse abdominals and right transverse abdominals  Tests     Lumbar     Left   Negative crossed SLR, passive SLR and slump test      Right   Negative crossed SLR, passive SLR and slump test                Diagnosis:    Precautions:    Manuals 10/16 10/21 10/24 10/28  10/31   PROM  Hamstring, Gastroc, Quad bilateral  Knee flexion at EOT   Held     Mobs        Glute release                Exercise Diary        Prone on Elbows 10 x 10 sec 10x 10 sec  10x 10 sec      Lumbar Ext          TrA Sets With volleyball 10x 5 sec With volleyball 10x 5 sec With volleyball 10x 5 sec 3x10    Piriformis S 3x 30 sec       Multifidus Press        Prone Glute sets         Bridges 3x10  OSB: 2x0  OSB: 2x10    Seated Nerve Glides    2x10  10x ea    Lacrosse ball on the wall         Clamshells     Green, 2x10 ea    Hamstring stretch    3x 30 sec  3x30 sec 3x30 sec ea   Side-Stepping Green 5 laps at [employer]        Hip Abduciton Standing Green x10       Prone Quad stretch   3x30 sec ea 4x 30 sec ea 3x 30 sec     Hamstring Curl    OSB 2x12    Review of HEP      RT, PT   Review of FOTO / Discharge       RT, PT                          Modalities            CP PRN

## 2019-12-30 DIAGNOSIS — I10 HYPERTENSION, UNSPECIFIED TYPE: ICD-10-CM

## 2019-12-30 RX ORDER — LISINOPRIL 40 MG/1
TABLET ORAL
Qty: 90 TABLET | Refills: 3 | Status: SHIPPED | OUTPATIENT
Start: 2019-12-30 | End: 2020-02-28

## 2020-01-22 ENCOUNTER — TELEPHONE (OUTPATIENT)
Dept: FAMILY MEDICINE CLINIC | Facility: CLINIC | Age: 80
End: 2020-01-22

## 2020-02-28 ENCOUNTER — OFFICE VISIT (OUTPATIENT)
Dept: OBGYN CLINIC | Facility: MEDICAL CENTER | Age: 80
End: 2020-02-28
Payer: MEDICARE

## 2020-02-28 VITALS
BODY MASS INDEX: 34.11 KG/M2 | SYSTOLIC BLOOD PRESSURE: 155 MMHG | WEIGHT: 199.8 LBS | DIASTOLIC BLOOD PRESSURE: 81 MMHG | HEART RATE: 50 BPM | HEIGHT: 64 IN

## 2020-02-28 DIAGNOSIS — M75.42 IMPINGEMENT SYNDROME OF LEFT SHOULDER: Primary | ICD-10-CM

## 2020-02-28 DIAGNOSIS — M25.512 CHRONIC LEFT SHOULDER PAIN: ICD-10-CM

## 2020-02-28 DIAGNOSIS — G89.29 CHRONIC LEFT SHOULDER PAIN: ICD-10-CM

## 2020-02-28 PROCEDURE — 1160F RVW MEDS BY RX/DR IN RCRD: CPT | Performed by: ORTHOPAEDIC SURGERY

## 2020-02-28 PROCEDURE — 99213 OFFICE O/P EST LOW 20 MIN: CPT | Performed by: ORTHOPAEDIC SURGERY

## 2020-02-28 PROCEDURE — 20610 DRAIN/INJ JOINT/BURSA W/O US: CPT | Performed by: ORTHOPAEDIC SURGERY

## 2020-02-28 PROCEDURE — 3077F SYST BP >= 140 MM HG: CPT | Performed by: ORTHOPAEDIC SURGERY

## 2020-02-28 PROCEDURE — 3008F BODY MASS INDEX DOCD: CPT | Performed by: ORTHOPAEDIC SURGERY

## 2020-02-28 PROCEDURE — 1036F TOBACCO NON-USER: CPT | Performed by: ORTHOPAEDIC SURGERY

## 2020-02-28 PROCEDURE — 3079F DIAST BP 80-89 MM HG: CPT | Performed by: ORTHOPAEDIC SURGERY

## 2020-02-28 RX ORDER — BUPIVACAINE HYDROCHLORIDE 2.5 MG/ML
2 INJECTION, SOLUTION INFILTRATION; PERINEURAL
Status: COMPLETED | OUTPATIENT
Start: 2020-02-28 | End: 2020-02-28

## 2020-02-28 RX ORDER — BETAMETHASONE SODIUM PHOSPHATE AND BETAMETHASONE ACETATE 3; 3 MG/ML; MG/ML
12 INJECTION, SUSPENSION INTRA-ARTICULAR; INTRALESIONAL; INTRAMUSCULAR; SOFT TISSUE
Status: COMPLETED | OUTPATIENT
Start: 2020-02-28 | End: 2020-02-28

## 2020-02-28 RX ORDER — LIDOCAINE HYDROCHLORIDE 10 MG/ML
2 INJECTION, SOLUTION INFILTRATION; PERINEURAL
Status: COMPLETED | OUTPATIENT
Start: 2020-02-28 | End: 2020-02-28

## 2020-02-28 RX ADMIN — BUPIVACAINE HYDROCHLORIDE 2 ML: 2.5 INJECTION, SOLUTION INFILTRATION; PERINEURAL at 09:27

## 2020-02-28 RX ADMIN — LIDOCAINE HYDROCHLORIDE 2 ML: 10 INJECTION, SOLUTION INFILTRATION; PERINEURAL at 09:27

## 2020-02-28 RX ADMIN — BETAMETHASONE SODIUM PHOSPHATE AND BETAMETHASONE ACETATE 12 MG: 3; 3 INJECTION, SUSPENSION INTRA-ARTICULAR; INTRALESIONAL; INTRAMUSCULAR; SOFT TISSUE at 09:27

## 2020-02-28 NOTE — PROGRESS NOTES
Assessment:  1  Impingement syndrome of left shoulder     2  Chronic left shoulder pain         Plan:  The patient was provided with left subacromial steroid injection  She should notify office if her left leg pain worsens and a spine and pain referral can be provided  The patient can follow up as needed and is welcome to return at any point with any new or old issue  To do next visit:  Return if symptoms worsen or fail to improve  The above stated was discussed in layman's terms and the patient expressed understanding  All questions were answered to the patient's satisfaction  Scribe Attestation    I,:   Melony Montiel am acting as a scribe while in the presence of the attending physician :        I,:   Manas Felix MD personally performed the services described in this documentation    as scribed in my presence :              Subjective:   Kemar Gordon is a 70 y o  female who presents for follow up of left shoulder  She is s/p left subacromial steroid injection with about 5 months of benefit, 10/11/19  Today she complains of anterior and posterior left shoulder pain  Lying on left shoulder aggravates          Review of systems negative unless otherwise specified in HPI    Past Medical History:   Diagnosis Date    Acid reflux     [facility]    Hearing loss     Hypercholesterolemia     Hypertension     Wears glasses        Past Surgical History:   Procedure Laterality Date    BREAST BIOPSY      1995 left, 1997 unknown side    TONSILLECTOMY      TUBAL LIGATION      WISDOM TOOTH EXTRACTION  1962       Family History   Problem Relation Age of Onset    Diabetes Mother     Hypertension Mother     Cancer Father     Diabetes Father     Hypertension Father     Colon cancer Father 72    Congenital heart disease Daughter     Hyperlipidemia Other         pure    Lymphoma Sister 72    Lung cancer Sister 62    Skin cancer Brother 72        Melanoma    No Known Problems Maternal Grandmother     No Known Problems Maternal Grandfather     No Known Problems Paternal Grandmother     No Known Problems Paternal Grandfather     Prostate cancer Brother 72    No Known Problems Daughter        Social History     Occupational History    Not on file   Tobacco Use    Smoking status: Never Smoker    Smokeless tobacco: Never Used   Substance and Sexual Activity    Alcohol use: Yes     Frequency: Monthly or less    Drug use: No    Sexual activity: Not on file         Current Outpatient Medications:     amLODIPine (NORVASC) 5 mg tablet, Take 10 mg by mouth daily , Disp: , Rfl:     Ascorbic Acid (VITAMIN C) 500 MG CAPS, Take 1 capsule by mouth daily, Disp: , Rfl:     Calcium Carbonate-Vitamin D3 (CALCIUM 600/VITAMIN D) 600-400 MG-UNIT TABS, Take by mouth, Disp: , Rfl:     Cholecalciferol (VITAMIN D) 2000 units tablet, Take by mouth daily, Disp: , Rfl:     folic acid (FOLVITE) 585 MCG tablet, Take 1 tablet by mouth daily, Disp: , Rfl:     Glucosamine HCl 1500 MG TABS, Take 1,500 mg by mouth, Disp: , Rfl:     hydrochlorothiazide (HYDRODIURIL) 25 mg tablet, Take 1 tablet by mouth daily, Disp: , Rfl:     lisinopril (ZESTRIL) 40 mg tablet, Take 1 tablet (40 mg total) by mouth daily, Disp: 90 tablet, Rfl: 3    Omega-3 1000 MG CAPS, Take by mouth daily, Disp: , Rfl:     potassium chloride (MICRO-K) 10 MEQ CR capsule, Take 1 capsule by mouth daily, Disp: , Rfl:     simvastatin (ZOCOR) 20 mg tablet, Take 1 tablet by mouth daily, Disp: , Rfl:     Allergies   Allergen Reactions    Nebivolol Bradycardia    Sulfa Antibiotics Other (See Comments)     Unknown reaction     Eliquis [Apixaban] Rash    Motrin [Ibuprofen] GI Intolerance     If on prescription strength, over the counter she does fine with            Vitals:    02/28/20 0853   BP: 155/81   Pulse: (!) 50       Objective:  Physical exam  · General: Awake, Alert, Oriented  · Eyes: Pupils equal, round and reactive to light  · Heart: regular rate and rhythm  · Lungs: No audible wheezing  · Abdomen: soft                    Ortho Exam   Left shoulder:  Full flexion, abduction, IR  Limited ER  Weakness of supraspinatus    NVID    Diagnostics, reviewed and taken today if performed as documented:    None performed    Procedures, if performed today:    Large joint arthrocentesis: L subacromial bursa  Date/Time: 2/28/2020 9:27 AM  Consent given by: patient  Site marked: site marked  Timeout: Immediately prior to procedure a time out was called to verify the correct patient, procedure, equipment, support staff and site/side marked as required   Supporting Documentation  Indications: pain   Procedure Details  Location: shoulder - L subacromial bursa  Needle size: 25 G  Ultrasound guidance: no  Approach: posterolateral  Medications administered: 2 mL bupivacaine 0 25 %; 12 mg betamethasone acetate-betamethasone sodium phosphate 6 (3-3) mg/mL; 2 mL lidocaine 1 %    Patient tolerance: patient tolerated the procedure well with no immediate complications  Dressing:  Sterile dressing applied              Portions of the record may have been created with voice recognition software  Occasional wrong word or "sound a like" substitutions may have occurred due to the inherent limitations of voice recognition software  Read the chart carefully and recognize, using context, where substitutions have occurred

## 2020-03-08 NOTE — PROGRESS NOTES
Assessment/Plan:         Diagnoses and all orders for this visit:    Chronic cough    Lumbar radiculopathy    Essential hypertension  -     Comprehensive metabolic panel  -     CBC and differential    Hypercholesteremia  -     TSH, 3rd generation with Free T4 reflex  -     Lipid panel    Gastroesophageal reflux disease without esophagitis    Hiatal hernia    History of DVT of lower extremity          Suspect chronic cough secondary to GERD less likely from ACE  Trial of Omeprazole 20 mg daily x 30 days  Watch diet  Consider GI evaluation as well as additional testing for persistent cough  Repeat labs  OV 4-6 months  BMI Counseling: Body mass index is 34 67 kg/m²  The BMI is above normal  Nutrition recommendations include reducing portion sizes, decreasing overall calorie intake, consuming healthier snacks, moderation in carbohydrate intake, reducing intake of saturated fat and trans fat and reducing intake of cholesterol  Exercise recommendations include exercising 3-5 times per week  Patient ID: Phi López is a 70 y o  female  First office visit for this 70year old female  Medications reviewed  Hypertension blood pressures have been stable on Amlodipine 5 mg daily, Lisinopril 40 mg daily and HCTZ 25 mg daily  04/2019 Creatinine 0 91  Electrolytes normal  UA no proteinuria  Hyperlipidemia on Simvastatin 04/2019 lipid profile cholesterol 202  Triglycerides 69  HDL 87    LFTs normal  TSH 2 948  FBS 91  Mammogram 10/2019  SH non smoker  FH hypertension parents  Colon CA father  Prostate cancer brother  Melanoma brother  Non-Hodgkin's lymphoma sister        The following portions of the patient's history were reviewed and updated as appropriate: allergies, current medications, past family history, past medical history, past social history, past surgical history and problem list       Current Outpatient Medications:     amLODIPine (NORVASC) 5 mg tablet, Take 10 mg by mouth daily , Disp: , Rfl:   Ascorbic Acid (VITAMIN C) 500 MG CAPS, Take 1 capsule by mouth daily, Disp: , Rfl:     Calcium Carbonate-Vitamin D3 (CALCIUM 600/VITAMIN D) 600-400 MG-UNIT TABS, Take by mouth, Disp: , Rfl:     Cholecalciferol (VITAMIN D) 2000 units tablet, Take by mouth daily, Disp: , Rfl:     folic acid (FOLVITE) 901 MCG tablet, Take 1 tablet by mouth daily, Disp: , Rfl:     hydrochlorothiazide (HYDRODIURIL) 25 mg tablet, Take 1 tablet by mouth daily, Disp: , Rfl:     lisinopril (ZESTRIL) 40 mg tablet, Take 1 tablet (40 mg total) by mouth daily, Disp: 90 tablet, Rfl: 3    Omega-3 1000 MG CAPS, Take by mouth daily, Disp: , Rfl:     potassium chloride (MICRO-K) 10 MEQ CR capsule, Take 1 capsule by mouth daily, Disp: , Rfl:     simvastatin (ZOCOR) 20 mg tablet, Take 1 tablet by mouth daily, Disp: , Rfl:       Social History     Socioeconomic History    Marital status: /Civil Union     Spouse name: None    Number of children: None    Years of education: None    Highest education level: None   Occupational History    None   Social Needs    Financial resource strain: None    Food insecurity:     Worry: None     Inability: None    Transportation needs:     Medical: None     Non-medical: None   Tobacco Use    Smoking status: Never Smoker    Smokeless tobacco: Never Used   Substance and Sexual Activity    Alcohol use: Yes     Frequency: Monthly or less    Drug use: No    Sexual activity: None   Lifestyle    Physical activity:     Days per week: None     Minutes per session: None    Stress: None   Relationships    Social connections:     Talks on phone: None     Gets together: None     Attends Islam service: None     Active member of club or organization: None     Attends meetings of clubs or organizations: None     Relationship status: None    Intimate partner violence:     Fear of current or ex partner: None     Emotionally abused: None     Physically abused: None     Forced sexual activity: None Other Topics Concern    None   Social History Narrative    None         Family History   Problem Relation Age of Onset    Diabetes Mother     Hypertension Mother     Cancer Father     Diabetes Father     Hypertension Father     Colon cancer Father 72    Congenital heart disease Daughter     Hyperlipidemia Other         pure    Lymphoma Sister 72    Lung cancer Sister 62    Skin cancer Brother 72        Melanoma    No Known Problems Maternal Grandmother     No Known Problems Maternal Grandfather     No Known Problems Paternal Grandmother     No Known Problems Paternal Grandfather     Prostate cancer Brother 72    No Known Problems Daughter      Review of Systems   Constitutional: Negative for appetite change, chills, fatigue, fever and unexpected weight change  HENT: Positive for hearing loss (bilateral hearing aids  )  Negative for congestion, ear pain, rhinorrhea, sore throat and trouble swallowing  ENT evaluation for recurrent sore throat LPR   Eyes: Negative for visual disturbance  Respiratory: Positive for cough  Negative for shortness of breath and wheezing  Chronic non productive cough since 10/2019  CXR 01/2020 normal  On ACE not new  No nasal congestion or post nasal drainage  No history of allergic rhinitis or asthma  Non smoker  + reflux symptoms  Cardiovascular: Negative for chest pain, palpitations and leg swelling  History of DVT left leg treated with Eliquis  No recurrence  No FH venous thrombosis  Gastrointestinal: Negative for abdominal pain, blood in stool, constipation, diarrhea, nausea and vomiting  GERD she has been using prn Famotidine  Genitourinary: Negative for difficulty urinating  Musculoskeletal: Negative for arthralgias and myalgias  History of recurrent left lumbar radiculopathy currently asymptomatic  Prior PT  She uses prn Advil  Skin: Negative for rash  Neurological: Negative for dizziness and headaches  Hematological: Negative for adenopathy  Does not bruise/bleed easily  Psychiatric/Behavioral: Negative for dysphoric mood and sleep disturbance  Objective:      /64   Pulse 60   Temp (!) 97 4 °F (36 3 °C)   Resp 16   Ht 5' 4" (1 626 m)   Wt 91 6 kg (202 lb)   BMI 34 67 kg/m²            Physical Exam   Constitutional: She is oriented to person, place, and time  She appears well-developed and well-nourished  No distress  HENT:   Mouth/Throat: Oropharynx is clear and moist and mucous membranes are normal  No oral lesions  Normal dentition  Eyes: Pupils are equal, round, and reactive to light  Conjunctivae and EOM are normal  No scleral icterus  Neck: No JVD present  Carotid bruit is not present  No tracheal deviation present  No thyroid mass and no thyromegaly present  Cardiovascular: Normal rate, regular rhythm and normal heart sounds  Exam reveals no gallop  No murmur heard  Pulmonary/Chest: Effort normal and breath sounds normal  No respiratory distress  She has no wheezes  She has no rales  Abdominal: Soft  Bowel sounds are normal  She exhibits no distension, no abdominal bruit and no mass  There is no hepatosplenomegaly  There is no tenderness  There is no rebound and no guarding  Musculoskeletal: Normal range of motion  She exhibits no edema or deformity  Full ROM hips and knees  Negative SLR   Lymphadenopathy:     She has no cervical adenopathy  Neurological: She is alert and oriented to person, place, and time  She has normal strength  No cranial nerve deficit or sensory deficit  Gait normal    Reflex Scores:       Patellar reflexes are 2+ on the right side and 2+ on the left side  Achilles reflexes are 1+ on the right side and 1+ on the left side  Skin: No rash noted  No cyanosis  Nails show no clubbing  Psychiatric: She has a normal mood and affect  Nursing note and vitals reviewed          Recent Results (from the past 37632 hour(s))   UA (URINE) with reflex to Microscopic    Collection Time: 04/03/19  9:28 AM   Result Value Ref Range    Color, UA Yellow     Clarity, UA Clear     Specific [place], UA 1 010 1 003 - 1 030    pH, UA 5 5 4 5, 5 0, 5 5, 6 0, 6 5, 7 0, 7 5, 8 0    Leukocytes, UA Small (A) Negative    Nitrite, UA Negative Negative    Protein, UA Negative Negative mg/dl    Glucose, UA Negative Negative mg/dl    Ketones, UA Negative Negative mg/dl    Urobilinogen, UA 0 2 0 2, 1 0 E U /dl E U /dl    Bilirubin, UA Negative Negative    Blood, UA Negative Negative   Comprehensive metabolic panel    Collection Time: 04/03/19  9:28 AM   Result Value Ref Range    Sodium 142 136 - 145 mmol/L    Potassium 4 5 3 5 - 5 3 mmol/L    Chloride 104 100 - 108 mmol/L    CO2 29 21 - 32 mmol/L    ANION GAP 9 4 - 13 mmol/L    BUN 19 5 - 25 mg/dL    Creatinine 0 91 0 60 - 1 30 mg/dL    Glucose, Fasting 91 65 - 99 mg/dL    Calcium 9 5 8 3 - 10 1 mg/dL    AST 33 5 - 45 U/L    ALT 39 12 - 78 U/L    Alkaline Phosphatase 73 46 - 116 U/L    Total Protein 7 1 6 4 - 8 2 g/dL    Albumin 3 8 3 5 - 5 0 g/dL    Total Bilirubin 0 60 0 20 - 1 00 mg/dL    eGFR 61 ml/min/1 73sq m   TSH, 3rd generation    Collection Time: 04/03/19  9:28 AM   Result Value Ref Range    TSH 3RD GENERATON 2 948 0 358 - 3 740 uIU/mL   Lipid panel    Collection Time: 04/03/19  9:28 AM   Result Value Ref Range    Cholesterol 202 (H) 50 - 200 mg/dL    Triglycerides 69 <=150 mg/dL    HDL, Direct 87 (H) 40 - 60 mg/dL    LDL Calculated 101 (H) 0 - 100 mg/dL    Non-HDL-Chol (CHOL-HDL) 115 mg/dl   CK (with reflex to MB)    Collection Time: 04/03/19  9:28 AM   Result Value Ref Range    Total CK 96 26 - 192 U/L   Urine Microscopic    Collection Time: 04/03/19  9:28 AM   Result Value Ref Range    RBC, UA 0-1 (A) None Seen, 0-5 /hpf    WBC, UA 4-10 (A) None Seen, 0-5, 5-55, 5-65 /hpf    Epithelial Cells Occasional None Seen, Occasional /hpf    Bacteria, UA None Seen None Seen, Occasional /hpf

## 2020-03-09 ENCOUNTER — OFFICE VISIT (OUTPATIENT)
Dept: FAMILY MEDICINE CLINIC | Facility: CLINIC | Age: 80
End: 2020-03-09
Payer: MEDICARE

## 2020-03-09 ENCOUNTER — TELEPHONE (OUTPATIENT)
Dept: ADMINISTRATIVE | Facility: OTHER | Age: 80
End: 2020-03-09

## 2020-03-09 VITALS
SYSTOLIC BLOOD PRESSURE: 128 MMHG | DIASTOLIC BLOOD PRESSURE: 64 MMHG | BODY MASS INDEX: 34.49 KG/M2 | TEMPERATURE: 97.4 F | WEIGHT: 202 LBS | HEART RATE: 60 BPM | HEIGHT: 64 IN | RESPIRATION RATE: 16 BRPM

## 2020-03-09 DIAGNOSIS — M54.16 LUMBAR RADICULOPATHY: ICD-10-CM

## 2020-03-09 DIAGNOSIS — Z86.718 HISTORY OF DVT OF LOWER EXTREMITY: ICD-10-CM

## 2020-03-09 DIAGNOSIS — K44.9 HIATAL HERNIA: ICD-10-CM

## 2020-03-09 DIAGNOSIS — I10 ESSENTIAL HYPERTENSION: ICD-10-CM

## 2020-03-09 DIAGNOSIS — R05.3 CHRONIC COUGH: Primary | ICD-10-CM

## 2020-03-09 DIAGNOSIS — E78.00 HYPERCHOLESTEREMIA: ICD-10-CM

## 2020-03-09 DIAGNOSIS — K21.9 GASTROESOPHAGEAL REFLUX DISEASE WITHOUT ESOPHAGITIS: ICD-10-CM

## 2020-03-09 PROBLEM — M25.512 LEFT SHOULDER PAIN: Status: RESOLVED | Noted: 2019-07-12 | Resolved: 2020-03-09

## 2020-03-09 PROBLEM — M75.42 IMPINGEMENT SYNDROME OF LEFT SHOULDER: Status: RESOLVED | Noted: 2019-07-12 | Resolved: 2020-03-09

## 2020-03-09 PROCEDURE — 3078F DIAST BP <80 MM HG: CPT | Performed by: FAMILY MEDICINE

## 2020-03-09 PROCEDURE — 1036F TOBACCO NON-USER: CPT | Performed by: FAMILY MEDICINE

## 2020-03-09 PROCEDURE — 1160F RVW MEDS BY RX/DR IN RCRD: CPT | Performed by: FAMILY MEDICINE

## 2020-03-09 PROCEDURE — 3074F SYST BP LT 130 MM HG: CPT | Performed by: FAMILY MEDICINE

## 2020-03-09 PROCEDURE — 4040F PNEUMOC VAC/ADMIN/RCVD: CPT | Performed by: FAMILY MEDICINE

## 2020-03-09 PROCEDURE — 3008F BODY MASS INDEX DOCD: CPT | Performed by: FAMILY MEDICINE

## 2020-03-09 PROCEDURE — 99203 OFFICE O/P NEW LOW 30 MIN: CPT | Performed by: FAMILY MEDICINE

## 2020-03-09 NOTE — LETTER
Procedure Request Form: Colonoscopy      Date Requested: 20  Patient: Óscar Hendricks  Patient : 1940   Referring Provider: Boubacar Viramontes MD        Date of Procedure ___3/31/16 or most recent colonoscopy ____________       The above patient has informed us that they have completed their   most recent Colonoscopy at your facility  Please complete   this form and attach all corresponding procedure reports/results  Comments __________________________________________________________  ____________________________________________________________________  ____________________________________________________________________  ____________________________________________________________________    Facility Completing Procedure _________________________________________    Form Completed By (print name) _______________________________________      Signature __________________________________________________________      These reports are needed for  compliance    Please fax this completed form and a copy of the procedure report to our office located at [place] [address] as soon as possible to [phone] omar Thakkar: Phone [phone]    We thank you for your assistance in treating our mutual patient

## 2020-03-09 NOTE — TELEPHONE ENCOUNTER
Upon review of the In Basket request and the patient's chart, initial outreach has been made via fax, please see Contacts section for details  A second outreach attempt will be made within 3 business days      Thank you  Vitor Charles, [address] [place] [address] [phone] Fax [phone]

## 2020-03-10 NOTE — TELEPHONE ENCOUNTER
Upon review of the In Basket request we were able to locate, review, and update the patient chart as requested for CRC: Colonoscopy  Any additional questions or concerns should be emailed to the Practice Liaisons via [email or web address] email, please do not reply via In Basket      Thank you  Jaime Golden

## 2020-03-24 DIAGNOSIS — I10 HYPERTENSION, UNSPECIFIED TYPE: ICD-10-CM

## 2020-03-24 RX ORDER — LISINOPRIL 40 MG/1
TABLET ORAL
Qty: 90 TABLET | Refills: 3 | Status: SHIPPED | OUTPATIENT
Start: 2020-03-24 | End: 2020-05-19 | Stop reason: SDUPTHER

## 2020-05-04 ENCOUNTER — TELEPHONE (OUTPATIENT)
Dept: CARDIOLOGY CLINIC | Facility: CLINIC | Age: 80
End: 2020-05-04

## 2020-05-04 DIAGNOSIS — I10 ESSENTIAL HYPERTENSION: Primary | ICD-10-CM

## 2020-05-04 RX ORDER — HYDROCHLOROTHIAZIDE 25 MG/1
25 TABLET ORAL DAILY
Qty: 30 TABLET | Refills: 0 | Status: SHIPPED | OUTPATIENT
Start: 2020-05-04 | End: 2020-05-19 | Stop reason: SDUPTHER

## 2020-05-11 ENCOUNTER — ANNUAL EXAM (OUTPATIENT)
Dept: OBGYN CLINIC | Facility: CLINIC | Age: 80
End: 2020-05-11
Payer: MEDICARE

## 2020-05-11 VITALS
DIASTOLIC BLOOD PRESSURE: 80 MMHG | BODY MASS INDEX: 34.08 KG/M2 | HEIGHT: 64 IN | SYSTOLIC BLOOD PRESSURE: 130 MMHG | WEIGHT: 199.6 LBS

## 2020-05-11 DIAGNOSIS — Z01.419 ENCOUNTER FOR ANNUAL ROUTINE GYNECOLOGICAL EXAMINATION: Primary | ICD-10-CM

## 2020-05-11 DIAGNOSIS — Z12.39 BREAST CANCER SCREENING: ICD-10-CM

## 2020-05-11 PROCEDURE — G0101 CA SCREEN;PELVIC/BREAST EXAM: HCPCS | Performed by: OBSTETRICS & GYNECOLOGY

## 2020-05-19 ENCOUNTER — OFFICE VISIT (OUTPATIENT)
Dept: CARDIOLOGY CLINIC | Facility: CLINIC | Age: 80
End: 2020-05-19
Payer: MEDICARE

## 2020-05-19 VITALS
BODY MASS INDEX: 34.02 KG/M2 | DIASTOLIC BLOOD PRESSURE: 80 MMHG | WEIGHT: 199.3 LBS | HEIGHT: 64 IN | HEART RATE: 58 BPM | SYSTOLIC BLOOD PRESSURE: 148 MMHG | TEMPERATURE: 98.4 F

## 2020-05-19 DIAGNOSIS — R00.1 BRADYCARDIA WITH 51-60 BEATS PER MINUTE: ICD-10-CM

## 2020-05-19 DIAGNOSIS — I10 ESSENTIAL HYPERTENSION: Primary | ICD-10-CM

## 2020-05-19 DIAGNOSIS — I10 HYPERTENSION, UNSPECIFIED TYPE: ICD-10-CM

## 2020-05-19 DIAGNOSIS — E78.00 PURE HYPERCHOLESTEROLEMIA: ICD-10-CM

## 2020-05-19 DIAGNOSIS — R60.0 BILATERAL LEG EDEMA: ICD-10-CM

## 2020-05-19 PROCEDURE — 93000 ELECTROCARDIOGRAM COMPLETE: CPT | Performed by: INTERNAL MEDICINE

## 2020-05-19 PROCEDURE — 4040F PNEUMOC VAC/ADMIN/RCVD: CPT | Performed by: INTERNAL MEDICINE

## 2020-05-19 PROCEDURE — 1160F RVW MEDS BY RX/DR IN RCRD: CPT | Performed by: INTERNAL MEDICINE

## 2020-05-19 PROCEDURE — 3077F SYST BP >= 140 MM HG: CPT | Performed by: INTERNAL MEDICINE

## 2020-05-19 PROCEDURE — 1036F TOBACCO NON-USER: CPT | Performed by: INTERNAL MEDICINE

## 2020-05-19 PROCEDURE — 3079F DIAST BP 80-89 MM HG: CPT | Performed by: INTERNAL MEDICINE

## 2020-05-19 PROCEDURE — 99214 OFFICE O/P EST MOD 30 MIN: CPT | Performed by: INTERNAL MEDICINE

## 2020-05-19 PROCEDURE — 3008F BODY MASS INDEX DOCD: CPT | Performed by: INTERNAL MEDICINE

## 2020-05-19 RX ORDER — HYDROCHLOROTHIAZIDE 25 MG/1
25 TABLET ORAL DAILY
Qty: 90 TABLET | Refills: 3 | Status: SHIPPED | OUTPATIENT
Start: 2020-05-19 | End: 2020-06-17

## 2020-05-19 RX ORDER — OMEPRAZOLE 20 MG/1
20 CAPSULE, DELAYED RELEASE ORAL AS NEEDED
COMMUNITY
End: 2020-09-21 | Stop reason: SDUPTHER

## 2020-05-19 RX ORDER — LISINOPRIL 40 MG/1
40 TABLET ORAL DAILY
Qty: 90 TABLET | Refills: 3 | Status: SHIPPED | OUTPATIENT
Start: 2020-05-19 | End: 2020-07-02

## 2020-05-19 RX ORDER — AMLODIPINE BESYLATE 5 MG/1
5 TABLET ORAL 2 TIMES DAILY
Qty: 180 TABLET | Refills: 3 | Status: SHIPPED | OUTPATIENT
Start: 2020-05-19 | End: 2021-05-14

## 2020-06-15 ENCOUNTER — APPOINTMENT (OUTPATIENT)
Dept: LAB | Facility: AMBULARY SURGERY CENTER | Age: 80
End: 2020-06-15
Payer: MEDICARE

## 2020-06-15 LAB
ALBUMIN SERPL BCP-MCNC: 3.7 G/DL (ref 3.5–5)
ALP SERPL-CCNC: 90 U/L (ref 46–116)
ALT SERPL W P-5'-P-CCNC: 31 U/L (ref 12–78)
ANION GAP SERPL CALCULATED.3IONS-SCNC: 10 MMOL/L (ref 4–13)
AST SERPL W P-5'-P-CCNC: 30 U/L (ref 5–45)
BASOPHILS # BLD AUTO: 0.04 THOUSANDS/ΜL (ref 0–0.1)
BASOPHILS NFR BLD AUTO: 1 % (ref 0–1)
BILIRUB SERPL-MCNC: 0.7 MG/DL (ref 0.2–1)
BUN SERPL-MCNC: 16 MG/DL (ref 5–25)
CALCIUM SERPL-MCNC: 9.2 MG/DL (ref 8.3–10.1)
CHLORIDE SERPL-SCNC: 106 MMOL/L (ref 100–108)
CHOLEST SERPL-MCNC: 203 MG/DL (ref 50–200)
CO2 SERPL-SCNC: 20 MMOL/L (ref 21–32)
CREAT SERPL-MCNC: 0.92 MG/DL (ref 0.6–1.3)
EOSINOPHIL # BLD AUTO: 0.08 THOUSAND/ΜL (ref 0–0.61)
EOSINOPHIL NFR BLD AUTO: 1 % (ref 0–6)
ERYTHROCYTE [DISTWIDTH] IN BLOOD BY AUTOMATED COUNT: 13.5 % (ref 11.6–15.1)
GFR SERPL CREATININE-BSD FRML MDRD: 60 ML/MIN/1.73SQ M
GLUCOSE P FAST SERPL-MCNC: 87 MG/DL (ref 65–99)
HCT VFR BLD AUTO: 48.3 % (ref 34.8–46.1)
HDLC SERPL-MCNC: 76 MG/DL
HGB BLD-MCNC: 14.8 G/DL (ref 11.5–15.4)
IMM GRANULOCYTES # BLD AUTO: 0.02 THOUSAND/UL (ref 0–0.2)
IMM GRANULOCYTES NFR BLD AUTO: 0 % (ref 0–2)
LDLC SERPL CALC-MCNC: 107 MG/DL (ref 0–100)
LYMPHOCYTES # BLD AUTO: 2.11 THOUSANDS/ΜL (ref 0.6–4.47)
LYMPHOCYTES NFR BLD AUTO: 33 % (ref 14–44)
MCH RBC QN AUTO: 28.1 PG (ref 26.8–34.3)
MCHC RBC AUTO-ENTMCNC: 30.6 G/DL (ref 31.4–37.4)
MCV RBC AUTO: 92 FL (ref 82–98)
MONOCYTES # BLD AUTO: 0.58 THOUSAND/ΜL (ref 0.17–1.22)
MONOCYTES NFR BLD AUTO: 9 % (ref 4–12)
NEUTROPHILS # BLD AUTO: 3.51 THOUSANDS/ΜL (ref 1.85–7.62)
NEUTS SEG NFR BLD AUTO: 56 % (ref 43–75)
NONHDLC SERPL-MCNC: 127 MG/DL
NRBC BLD AUTO-RTO: 0 /100 WBCS
PLATELET # BLD AUTO: 170 THOUSANDS/UL (ref 149–390)
PMV BLD AUTO: 11.2 FL (ref 8.9–12.7)
POTASSIUM SERPL-SCNC: 4.2 MMOL/L (ref 3.5–5.3)
PROT SERPL-MCNC: 7.2 G/DL (ref 6.4–8.2)
RBC # BLD AUTO: 5.26 MILLION/UL (ref 3.81–5.12)
SODIUM SERPL-SCNC: 136 MMOL/L (ref 136–145)
T4 FREE SERPL-MCNC: 1.09 NG/DL (ref 0.76–1.46)
TRIGL SERPL-MCNC: 100 MG/DL
TSH SERPL DL<=0.05 MIU/L-ACNC: 3.94 UIU/ML (ref 0.36–3.74)
WBC # BLD AUTO: 6.34 THOUSAND/UL (ref 4.31–10.16)

## 2020-06-15 PROCEDURE — 84439 ASSAY OF FREE THYROXINE: CPT | Performed by: FAMILY MEDICINE

## 2020-06-15 PROCEDURE — 84443 ASSAY THYROID STIM HORMONE: CPT | Performed by: FAMILY MEDICINE

## 2020-06-15 PROCEDURE — 85025 COMPLETE CBC W/AUTO DIFF WBC: CPT | Performed by: FAMILY MEDICINE

## 2020-06-15 PROCEDURE — 80053 COMPREHEN METABOLIC PANEL: CPT | Performed by: FAMILY MEDICINE

## 2020-06-15 PROCEDURE — 36415 COLL VENOUS BLD VENIPUNCTURE: CPT | Performed by: FAMILY MEDICINE

## 2020-06-15 PROCEDURE — 80061 LIPID PANEL: CPT | Performed by: FAMILY MEDICINE

## 2020-06-17 DIAGNOSIS — I10 ESSENTIAL HYPERTENSION: ICD-10-CM

## 2020-06-17 RX ORDER — HYDROCHLOROTHIAZIDE 25 MG/1
TABLET ORAL
Qty: 30 TABLET | Refills: 0 | Status: SHIPPED | OUTPATIENT
Start: 2020-06-17 | End: 2020-07-17

## 2020-06-18 NOTE — RESULT ENCOUNTER NOTE
Viri Coelho I reviewed your recent labs  All results are normal except for a slight decrease in CO2 this can be seen with diuretic therapy  You are on hydrochlorothiazide which is a diuretic  I would not recommend stopping this medication at this time  your fasting blood sugar was normal at 87 less than 100 normal   kidney and liver tests normal   Your TSH or thyroid study is borderline abnormal and does not require treatment at this time  I would recheck this in 6 months  Your cholesterol was mildly elevated at 202 less than 200 normal   Watch diet    Hematocrit high normal with a normal hemoglobin this can be affected by hydration tried to stay hydrated over the summer      Current Outpatient Medications:     Ascorbic Acid (VITAMIN C) 500 MG CAPS, Take 1 capsule by mouth daily, Disp: , Rfl:     Calcium Carbonate-Vitamin D3 (CALCIUM 600/VITAMIN D) 600-400 MG-UNIT TABS, Take by mouth, Disp: , Rfl:     Cholecalciferol (VITAMIN D) 2000 units tablet, Take by mouth daily, Disp: , Rfl:     folic acid (FOLVITE) 634 MCG tablet, Take 1 tablet by mouth daily, Disp: , Rfl:     Omega-3 1000 MG CAPS, Take by mouth daily, Disp: , Rfl:     potassium chloride (MICRO-K) 10 MEQ CR capsule, Take 1 capsule by mouth daily, Disp: , Rfl:     simvastatin (ZOCOR) 20 mg tablet, Take 1 tablet by mouth daily, Disp: , Rfl:     amLODIPine (NORVASC) 5 mg tablet, Take 1 tablet (5 mg total) by mouth 2 (two) times a day, Disp: 180 tablet, Rfl: 3    hydrochlorothiazide (HYDRODIURIL) 25 mg tablet, TAKE 1 TABLET BY MOUTH EVERY DAY, Disp: 30 tablet, Rfl: 0    lisinopril (ZESTRIL) 40 mg tablet, Take 1 tablet (40 mg total) by mouth daily, Disp: 90 tablet, Rfl: 3    omeprazole (PriLOSEC) 20 mg delayed release capsule, Take 20 mg by mouth as needed, Disp: , Rfl:

## 2020-07-02 ENCOUNTER — OFFICE VISIT (OUTPATIENT)
Dept: FAMILY MEDICINE CLINIC | Facility: CLINIC | Age: 80
End: 2020-07-02
Payer: MEDICARE

## 2020-07-02 VITALS
SYSTOLIC BLOOD PRESSURE: 142 MMHG | HEIGHT: 64 IN | WEIGHT: 204 LBS | RESPIRATION RATE: 18 BRPM | BODY MASS INDEX: 34.83 KG/M2 | OXYGEN SATURATION: 97 % | HEART RATE: 76 BPM | DIASTOLIC BLOOD PRESSURE: 78 MMHG | TEMPERATURE: 99.7 F

## 2020-07-02 DIAGNOSIS — K21.9 GASTROESOPHAGEAL REFLUX DISEASE WITHOUT ESOPHAGITIS: ICD-10-CM

## 2020-07-02 DIAGNOSIS — R05.3 CHRONIC COUGH: Primary | ICD-10-CM

## 2020-07-02 DIAGNOSIS — K44.9 HIATAL HERNIA: ICD-10-CM

## 2020-07-02 DIAGNOSIS — I10 ESSENTIAL HYPERTENSION: ICD-10-CM

## 2020-07-02 PROCEDURE — 1160F RVW MEDS BY RX/DR IN RCRD: CPT | Performed by: FAMILY MEDICINE

## 2020-07-02 PROCEDURE — 4040F PNEUMOC VAC/ADMIN/RCVD: CPT | Performed by: FAMILY MEDICINE

## 2020-07-02 PROCEDURE — 1036F TOBACCO NON-USER: CPT | Performed by: FAMILY MEDICINE

## 2020-07-02 PROCEDURE — 3078F DIAST BP <80 MM HG: CPT | Performed by: FAMILY MEDICINE

## 2020-07-02 PROCEDURE — 3008F BODY MASS INDEX DOCD: CPT | Performed by: FAMILY MEDICINE

## 2020-07-02 PROCEDURE — 3077F SYST BP >= 140 MM HG: CPT | Performed by: FAMILY MEDICINE

## 2020-07-02 PROCEDURE — 99214 OFFICE O/P EST MOD 30 MIN: CPT | Performed by: FAMILY MEDICINE

## 2020-07-02 RX ORDER — VALSARTAN 80 MG/1
80 TABLET ORAL DAILY
Qty: 30 TABLET | Refills: 2 | Status: SHIPPED | OUTPATIENT
Start: 2020-07-02 | End: 2020-09-03

## 2020-07-02 NOTE — PROGRESS NOTES
Assessment/Plan:         Diagnoses and all orders for this visit:    Chronic cough    Essential hypertension  -     valsartan (DIOVAN) 80 mg tablet; Take 1 tablet (80 mg total) by mouth daily    Gastroesophageal reflux disease without esophagitis    Hiatal hernia          D/C Lisinopril  Start Valsartan 80 mg daily  Monitor blood pressures at home dose may need to be titrated  If cough persist I recommended additional testing to include CT scan chest, methacholine challenge test and allergy testing  Advised to call if any changes     Patient ID: Brooklynn Linn is a 70 y o  female  Cough   This is a chronic problem  The current episode started more than 1 year ago  The problem has been unchanged  The problem occurs every few hours  The cough is non-productive  Pertinent negatives include no chest pain, chills, ear congestion, ear pain, fever, headaches, heartburn, hemoptysis, myalgias, nasal congestion, postnasal drip, rash, rhinorrhea, sore throat, shortness of breath, sweats, weight loss or wheezing  Nothing aggravates the symptoms  As above  Chronic non productive cough dating back to 2016  She was on an ACE inhibitor ( Benazepril) at that time  She was switched to several different ARBs but eventually ended back on an ACE-Lisinopril  No nasal congestion or post nasal drainage  No history of allergic rhinitis or asthma  Non smoker  GERD symptoms controlled on Omeprazole 20 mg daily   CXR 01/2020 normal     Recent Results (from the past 672 hour(s))   Comprehensive metabolic panel    Collection Time: 06/15/20 11:52 AM   Result Value Ref Range    Sodium 136 136 - 145 mmol/L    Potassium 4 2 3 5 - 5 3 mmol/L    Chloride 106 100 - 108 mmol/L    CO2 20 (L) 21 - 32 mmol/L    ANION GAP 10 4 - 13 mmol/L    BUN 16 5 - 25 mg/dL    Creatinine 0 92 0 60 - 1 30 mg/dL    Glucose, Fasting 87 65 - 99 mg/dL    Calcium 9 2 8 3 - 10 1 mg/dL    AST 30 5 - 45 U/L    ALT 31 12 - 78 U/L    Alkaline Phosphatase 90 46 - 116 U/L Total Protein 7 2 6 4 - 8 2 g/dL    Albumin 3 7 3 5 - 5 0 g/dL    Total Bilirubin 0 70 0 20 - 1 00 mg/dL    eGFR 60 ml/min/1 73sq m   CBC and differential    Collection Time: 06/15/20 11:52 AM   Result Value Ref Range    WBC 6 34 4 31 - 10 16 Thousand/uL    RBC 5 26 (H) 3 81 - 5 12 Million/uL    Hemoglobin 14 8 11 5 - 15 4 g/dL    Hematocrit 48 3 (H) 34 8 - 46 1 %    MCV 92 82 - 98 fL    MCH 28 1 26 8 - 34 3 pg    MCHC 30 6 (L) 31 4 - 37 4 g/dL    RDW 13 5 11 6 - 15 1 %    MPV 11 2 8 9 - 12 7 fL    Platelets [phone] - 006 Thousands/uL    nRBC 0 /100 WBCs    Neutrophils Relative 56 43 - 75 %    Immat GRANS % 0 0 - 2 %    Lymphocytes Relative 33 14 - 44 %    Monocytes Relative 9 4 - 12 %    Eosinophils Relative 1 0 - 6 %    Basophils Relative 1 0 - 1 %    Neutrophils Absolute 3 51 1 85 - 7 62 Thousands/µL    Immature Grans Absolute 0 02 0 00 - 0 20 Thousand/uL    Lymphocytes Absolute 2 11 0 60 - 4 47 Thousands/µL    Monocytes Absolute 0 58 0 17 - 1 22 Thousand/µL    Eosinophils Absolute 0 08 0 00 - 0 61 Thousand/µL    Basophils Absolute 0 04 0 00 - 0 10 Thousands/µL   TSH, 3rd generation with Free T4 reflex    Collection Time: 06/15/20 11:52 AM   Result Value Ref Range    TSH 3RD GENERATON 3 940 (H) 0 358 - 3 740 uIU/mL   Lipid panel    Collection Time: 06/15/20 11:52 AM   Result Value Ref Range    Cholesterol 203 (H) 50 - 200 mg/dL    Triglycerides 100 <=150 mg/dL    HDL, Direct 76 >=40 mg/dL    LDL Calculated 107 (H) 0 - 100 mg/dL    Non-HDL-Chol (CHOL-HDL) 127 mg/dl   T4, free    Collection Time: 06/15/20 11:52 AM   Result Value Ref Range    Free T4 1 09 0 76 - 1 46 ng/dL         The following portions of the patient's history were reviewed and updated as appropriate: allergies, current medications, past family history, past medical history, past social history, past surgical history and problem list     Review of Systems   Constitutional: Negative for appetite change, chills, fatigue, fever, unexpected weight change and weight loss  HENT: Positive for hearing loss (bilateral hearing aids  )  Negative for congestion, ear pain, postnasal drip, rhinorrhea, sore throat and trouble swallowing  ENT evaluation for recurrent sore throat LPR   Eyes: Negative for visual disturbance  Respiratory: Positive for cough  Negative for hemoptysis, shortness of breath and wheezing  See HPI    Cardiovascular: Negative for chest pain, palpitations and leg swelling  History of DVT left leg treated with Eliquis  No recurrence  No FH venous thrombosis  Gastrointestinal: Negative for abdominal pain, blood in stool, constipation, diarrhea, heartburn, nausea and vomiting  See HPI    Genitourinary: Negative for difficulty urinating  Musculoskeletal: Negative for arthralgias and myalgias  History of recurrent left lumbar radiculopathy currently asymptomatic  Prior PT  She uses prn Advil  Skin: Negative for rash  While sleeping she felt like she was bit by something-left buttock  No rash  Persistent mild discomfort in the left buttock area  Neurological: Negative for dizziness and headaches  Hematological: Negative for adenopathy  Does not bruise/bleed easily  Psychiatric/Behavioral: Negative for dysphoric mood and sleep disturbance  Objective:      /78 (BP Location: Left arm, Patient Position: Sitting, Cuff Size: Large)   Pulse 76   Temp 99 7 °F (37 6 °C)   Resp 18   Ht 5' 4" (1 626 m)   Wt 92 5 kg (204 lb)   SpO2 97%   Breastfeeding No   BMI 35 02 kg/m²     BP Readings from Last 3 Encounters:   07/02/20 142/78   05/19/20 148/80   05/11/20 130/80        Physical Exam   Constitutional: She is oriented to person, place, and time  She appears well-developed and well-nourished  No distress  HENT:   Right Ear: Tympanic membrane normal    Left Ear: Tympanic membrane normal    Nose: Right sinus exhibits no maxillary sinus tenderness and no frontal sinus tenderness   Left sinus exhibits no maxillary sinus tenderness and no frontal sinus tenderness  Mouth/Throat: Mucous membranes are normal  No oral lesions  Eyes: Pupils are equal, round, and reactive to light  Conjunctivae are normal    Cardiovascular: Normal rate, regular rhythm and normal heart sounds  Exam reveals no gallop  No murmur heard  Pulmonary/Chest: Effort normal and breath sounds normal  No respiratory distress  She has no wheezes  She has no rales  Lymphadenopathy:     She has no cervical adenopathy  Neurological: She is alert and oriented to person, place, and time  Skin: No rash noted  No lesions seen left gluteal area  No palpable tenderness   Psychiatric: She has a normal mood and affect  Her behavior is normal    Nursing note and vitals reviewed

## 2020-07-02 NOTE — PROGRESS NOTES
Answers for HPI/ROS submitted by the patient on 6/25/2020   Cough  Chronicity: chronic  Onset: more than 1 year ago  Progression since onset: unchanged  Frequency: every few hours  Cough characteristics: non-productive  chest pain: No  chills: No  ear congestion: No  ear pain: No  fever: No  headaches: No  heartburn: No  hemoptysis: No  myalgias: No  nasal congestion: No  postnasal drip: No  rash: No  rhinorrhea: No  shortness of breath: No  sore throat: No  sweats: No  weight loss:  No  wheezing: No  Aggravated by: nothing

## 2020-07-17 DIAGNOSIS — I10 ESSENTIAL HYPERTENSION: ICD-10-CM

## 2020-07-17 RX ORDER — HYDROCHLOROTHIAZIDE 25 MG/1
TABLET ORAL
Qty: 30 TABLET | Refills: 0 | Status: SHIPPED | OUTPATIENT
Start: 2020-07-17 | End: 2020-08-16

## 2020-08-16 DIAGNOSIS — I10 ESSENTIAL HYPERTENSION: ICD-10-CM

## 2020-08-16 RX ORDER — HYDROCHLOROTHIAZIDE 25 MG/1
TABLET ORAL
Qty: 30 TABLET | Refills: 0 | Status: SHIPPED | OUTPATIENT
Start: 2020-08-16 | End: 2021-05-23

## 2020-09-03 DIAGNOSIS — I10 ESSENTIAL HYPERTENSION: ICD-10-CM

## 2020-09-03 RX ORDER — VALSARTAN 80 MG/1
TABLET ORAL
Qty: 30 TABLET | Refills: 2 | Status: SHIPPED | OUTPATIENT
Start: 2020-09-03 | End: 2020-09-21 | Stop reason: SDUPTHER

## 2020-09-21 ENCOUNTER — OFFICE VISIT (OUTPATIENT)
Dept: FAMILY MEDICINE CLINIC | Facility: CLINIC | Age: 80
End: 2020-09-21
Payer: MEDICARE

## 2020-09-21 VITALS
TEMPERATURE: 97.5 F | BODY MASS INDEX: 35 KG/M2 | HEIGHT: 64 IN | SYSTOLIC BLOOD PRESSURE: 136 MMHG | DIASTOLIC BLOOD PRESSURE: 88 MMHG | WEIGHT: 205 LBS | HEART RATE: 62 BPM

## 2020-09-21 DIAGNOSIS — E78.00 PURE HYPERCHOLESTEROLEMIA: ICD-10-CM

## 2020-09-21 DIAGNOSIS — I10 ESSENTIAL HYPERTENSION: Primary | ICD-10-CM

## 2020-09-21 DIAGNOSIS — K21.9 GASTROESOPHAGEAL REFLUX DISEASE WITHOUT ESOPHAGITIS: ICD-10-CM

## 2020-09-21 DIAGNOSIS — Z00.00 MEDICARE ANNUAL WELLNESS VISIT, SUBSEQUENT: ICD-10-CM

## 2020-09-21 DIAGNOSIS — K44.9 HIATAL HERNIA: ICD-10-CM

## 2020-09-21 PROBLEM — R00.1 BRADYCARDIA WITH 51-60 BEATS PER MINUTE: Status: RESOLVED | Noted: 2020-05-19 | Resolved: 2020-09-21

## 2020-09-21 PROBLEM — R60.0 BILATERAL LEG EDEMA: Status: RESOLVED | Noted: 2020-05-19 | Resolved: 2020-09-21

## 2020-09-21 PROCEDURE — 99214 OFFICE O/P EST MOD 30 MIN: CPT | Performed by: FAMILY MEDICINE

## 2020-09-21 PROCEDURE — G0438 PPPS, INITIAL VISIT: HCPCS | Performed by: FAMILY MEDICINE

## 2020-09-21 RX ORDER — OMEPRAZOLE 20 MG/1
20 CAPSULE, DELAYED RELEASE ORAL DAILY
Qty: 90 CAPSULE | Refills: 3 | Status: SHIPPED | OUTPATIENT
Start: 2020-09-21 | End: 2021-08-23

## 2020-09-21 RX ORDER — VALSARTAN 80 MG/1
80 TABLET ORAL DAILY
Qty: 90 TABLET | Refills: 3 | Status: SHIPPED | OUTPATIENT
Start: 2020-09-21 | End: 2021-08-23

## 2020-09-21 NOTE — PROGRESS NOTES
Assessment/Plan:     Diagnoses and all orders for this visit:    Essential hypertension  -     valsartan (DIOVAN) 80 mg tablet; Take 1 tablet (80 mg total) by mouth daily  -     CBC and differential  -     Comprehensive metabolic panel    Pure hypercholesterolemia  -     TSH, 3rd generation with Free T4 reflex  -     Lipid panel    Gastroesophageal reflux disease without esophagitis  -     omeprazole (PriLOSEC) 20 mg delayed release capsule; Take 1 capsule (20 mg total) by mouth daily    Hiatal hernia    Medicare annual wellness visit, subsequent    Other orders  -     Cancel: influenza vaccine, high-dose, PF 0 7 mL (FLUZONE HIGH-DOSE)          Continue with current medications  No treatment at this time for high normal TSH  OV 6 months with repeat labs at that time  Flu vaccine 10/2020    BMI Counseling: Body mass index is 35 19 kg/m²  The BMI is above normal  Nutrition recommendations include reducing portion sizes, decreasing overall calorie intake, consuming healthier snacks, moderation in carbohydrate intake, reducing intake of saturated fat and trans fat and reducing intake of cholesterol  Exercise recommendations include exercising 3-5 times per week  Patient ID: Cheng Pritchett is a 70 y o  female  Follow up visit  Medications reviewed  Hypertension blood pressures have been stable on Amlodipine 5 mg daily, Valsartan 80 mg daily and HCTZ 25 mg daily  BP readings from home 120-130/70-80 range  Labs 06/2020 Creatinine 0 92  Electrolytes normal  Hgb 14 8  04/2019 UA no proteinuria  Hyperlipidemia on Simvastatin 20 mg/day 06/2020 lipid profile cholesterol 203  Triglycerides 100  HDL 76    LFTs normal  FBS 87       Recent Results (from the past 3360 hour(s))   Comprehensive metabolic panel    Collection Time: 06/15/20 11:52 AM   Result Value Ref Range    Sodium 136 136 - 145 mmol/L    Potassium 4 2 3 5 - 5 3 mmol/L    Chloride 106 100 - 108 mmol/L    CO2 20 (L) 21 - 32 mmol/L    ANION GAP 10 4 - 13 mmol/L    BUN 16 5 - 25 mg/dL    Creatinine 0 92 0 60 - 1 30 mg/dL    Glucose, Fasting 87 65 - 99 mg/dL    Calcium 9 2 8 3 - 10 1 mg/dL    AST 30 5 - 45 U/L    ALT 31 12 - 78 U/L    Alkaline Phosphatase 90 46 - 116 U/L    Total Protein 7 2 6 4 - 8 2 g/dL    Albumin 3 7 3 5 - 5 0 g/dL    Total Bilirubin 0 70 0 20 - 1 00 mg/dL    eGFR 60 ml/min/1 73sq m   CBC and differential    Collection Time: 06/15/20 11:52 AM   Result Value Ref Range    WBC 6 34 4 31 - 10 16 Thousand/uL    RBC 5 26 (H) 3 81 - 5 12 Million/uL    Hemoglobin 14 8 11 5 - 15 4 g/dL    Hematocrit 48 3 (H) 34 8 - 46 1 %    MCV 92 82 - 98 fL    MCH 28 1 26 8 - 34 3 pg    MCHC 30 6 (L) 31 4 - 37 4 g/dL    RDW 13 5 11 6 - 15 1 %    MPV 11 2 8 9 - 12 7 fL    Platelets 290 505 - 398 Thousands/uL    nRBC 0 /100 WBCs    Neutrophils Relative 56 43 - 75 %    Immat GRANS % 0 0 - 2 %    Lymphocytes Relative 33 14 - 44 %    Monocytes Relative 9 4 - 12 %    Eosinophils Relative 1 0 - 6 %    Basophils Relative 1 0 - 1 %    Neutrophils Absolute 3 51 1 85 - 7 62 Thousands/µL    Immature Grans Absolute 0 02 0 00 - 0 20 Thousand/uL    Lymphocytes Absolute 2 11 0 60 - 4 47 Thousands/µL    Monocytes Absolute 0 58 0 17 - 1 22 Thousand/µL    Eosinophils Absolute 0 08 0 00 - 0 61 Thousand/µL    Basophils Absolute 0 04 0 00 - 0 10 Thousands/µL   TSH, 3rd generation with Free T4 reflex    Collection Time: 06/15/20 11:52 AM   Result Value Ref Range    TSH 3RD GENERATON 3 940 (H) 0 358 - 3 740 uIU/mL   Lipid panel    Collection Time: 06/15/20 11:52 AM   Result Value Ref Range    Cholesterol 203 (H) 50 - 200 mg/dL    Triglycerides 100 <=150 mg/dL    HDL, Direct 76 >=40 mg/dL    LDL Calculated 107 (H) 0 - 100 mg/dL    Non-HDL-Chol (CHOL-HDL) 127 mg/dl   T4, free    Collection Time: 06/15/20 11:52 AM   Result Value Ref Range    Free T4 1 09 0 76 - 1 46 ng/dL        Lab Results   Component Value Date    CHOLESTEROL 203 (H) 06/15/2020    CHOLESTEROL 202 (H) 04/03/2019    CHOLESTEROL 203 (H) 07/13/2018     Lab Results   Component Value Date    HDL 76 06/15/2020    HDL 87 (H) 04/03/2019    HDL 79 (H) 07/13/2018     Lab Results   Component Value Date    TRIG 100 06/15/2020    TRIG 69 04/03/2019    TRIG 83 07/13/2018     Lab Results   Component Value Date    [place] 127 06/15/2020    [place] 115 04/03/2019    [place] 124 07/13/2018         The following portions of the patient's history were reviewed and updated as appropriate: allergies, current medications, past family history, past medical history, past social history, past surgical history and problem list     Review of Systems   Constitutional: Positive for unexpected weight change (6 lb weight gain from 06/2020)  Negative for appetite change, chills, fatigue and fever  HENT: Positive for hearing loss (bilateral hearing aids  )  Negative for congestion, ear pain, postnasal drip, rhinorrhea, sore throat and trouble swallowing  ENT evaluation for recurrent sore throat LPR   Eyes: Negative for visual disturbance  Respiratory: Negative for cough, shortness of breath and wheezing  Cough has resolved since changing from Lisinopril to Valsartan  Cardiovascular: Negative for chest pain, palpitations and leg swelling  History of DVT left leg treated with Eliquis  No recurrence  No FH venous thrombosis  Gastrointestinal: Negative for abdominal pain, blood in stool, constipation, diarrhea, nausea and vomiting  GERD stable on Omeprazole 20 mg/day    Endocrine:        Lab Results       Component                Value               Date                       [id number]             3 940 (H)           06/15/2020               Genitourinary: Negative for difficulty urinating  Musculoskeletal: Negative for arthralgias and myalgias  History of recurrent left lumbar radiculopathy currently asymptomatic  Prior PT  She uses prn Advil  Skin: Negative for rash  Neurological: Negative for dizziness and headaches  Hematological: Negative for adenopathy  Does not bruise/bleed easily  Psychiatric/Behavioral: Negative for dysphoric mood and sleep disturbance  Objective:      /88 (BP Location: Left arm, Patient Position: Sitting, Cuff Size: Large)   Pulse 62   Temp 97 5 °F (36 4 °C)   Ht 5' 4" (1 626 m)   Wt 93 kg (205 lb)   BMI 35 19 kg/m²       BP Readings from Last 3 Encounters:   09/21/20 136/88   07/02/20 142/78   05/19/20 148/80     Wt Readings from Last 3 Encounters:   09/21/20 93 kg (205 lb)   07/02/20 92 5 kg (204 lb)   06/04/20 90 3 kg (199 lb)          Physical Exam  Vitals signs and nursing note reviewed  Constitutional:       General: She is not in acute distress  Appearance: She is well-developed  HENT:      Mouth/Throat:      Mouth: No oral lesions  Dentition: Normal dentition  Eyes:      General: No scleral icterus  Conjunctiva/sclera: Conjunctivae normal       Pupils: Pupils are equal, round, and reactive to light  Neck:      Thyroid: No thyroid mass or thyromegaly  Vascular: No carotid bruit or JVD  Trachea: No tracheal deviation  Cardiovascular:      Rate and Rhythm: Normal rate and regular rhythm  Heart sounds: Normal heart sounds  No murmur  No gallop  Pulmonary:      Effort: Pulmonary effort is normal  No respiratory distress  Breath sounds: Normal breath sounds  No wheezing or rales  Abdominal:      General: Bowel sounds are normal  There is no distension or abdominal bruit  Palpations: Abdomen is soft  There is no mass  Tenderness: There is no abdominal tenderness  There is no guarding or rebound  Musculoskeletal: Normal range of motion  General: No deformity  Lymphadenopathy:      Cervical: No cervical adenopathy  Skin:     Findings: No rash  Nails: There is no clubbing  Neurological:      Mental Status: She is alert and oriented to person, place, and time  Cranial Nerves: No cranial nerve deficit  Deep Tendon Reflexes: Reflexes normal

## 2020-09-21 NOTE — PATIENT INSTRUCTIONS
Medicare Preventive Visit Patient Instructions  Thank you for completing your Welcome to Medicare Visit or Medicare Annual Wellness Visit today  Your next wellness visit will be due in one year (9/21/2021)  The screening/preventive services that you may require over the next 5-10 years are detailed below  Some tests may not apply to you based off risk factors and/or age  Screening tests ordered at today's visit but not completed yet may show as past due  Also, please note that scanned in results may not display below  Preventive Screenings:  Service Recommendations Previous Testing/Comments   Colorectal Cancer Screening  * Colonoscopy    * Fecal Occult Blood Test (FOBT)/Fecal Immunochemical Test (FIT)  * Fecal DNA/Cologuard Test  * Flexible Sigmoidoscopy Age: 54-65 years old   Colonoscopy: every 10 years (may be performed more frequently if at higher risk)  OR  FOBT/FIT: every 1 year  OR  Cologuard: every 3 years  OR  Sigmoidoscopy: every 5 years  Screening may be recommended earlier than age 48 if at higher risk for colorectal cancer  Also, an individualized decision between you and your healthcare provider will decide whether screening between the ages of 74-80 would be appropriate  Colonoscopy: 03/31/2016  FOBT/FIT: Not on file  Cologuard: Not on file  Sigmoidoscopy: Not on file         Breast Cancer Screening Age: 36 years old  Frequency: every 1-2 years  Not required if history of left and right mastectomy Mammogram: 10/07/2019    Screening Current   Cervical Cancer Screening Between the ages of 21-29, pap smear recommended once every 3 years  Between the ages of 33-67, can perform pap smear with HPV co-testing every 5 years     Recommendations may differ for women with a history of total hysterectomy, cervical cancer, or abnormal pap smears in past  Pap Smear: 05/11/2020    Screening Not Indicated   Hepatitis C Screening Once for adults born between 1945 and 1965  More frequently in patients at high risk for Hepatitis C Hep C Antibody: Not on file       Diabetes Screening 1-2 times per year if you're at risk for diabetes or have pre-diabetes Fasting glucose: 87 mg/dL   A1C: No results in last 5 years    Screening Current   Cholesterol Screening Once every 5 years if you don't have a lipid disorder  May order more often based on risk factors  Lipid panel: 06/15/2020    Screening Not Indicated  History Lipid Disorder     Other Preventive Screenings Covered by Medicare:  1  Abdominal Aortic Aneurysm (AAA) Screening: covered once if your at risk  You're considered to be at risk if you have a family history of AAA  2  Lung Cancer Screening: covers low dose CT scan once per year if you meet all of the following conditions: (1) Age 50-69; (2) No signs or symptoms of lung cancer; (3) Current smoker or have quit smoking within the last 15 years; (4) You have a tobacco smoking history of at least 30 pack years (packs per day multiplied by number of years you smoked); (5) You get a written order from a healthcare provider  3  Glaucoma Screening: covered annually if you're considered high risk: (1) You have diabetes OR (2) Family history of glaucoma OR (3)  aged 48 and older OR (3)  American aged 72 and older  3  Osteoporosis Screening: covered every 2 years if you meet one of the following conditions: (1) You're estrogen deficient and at risk for osteoporosis based off medical history and other findings; (2) Have a vertebral abnormality; (3) On glucocorticoid therapy for more than 3 months; (4) Have primary hyperparathyroidism; (5) On osteoporosis medications and need to assess response to drug therapy  · Last bone density test (DXA Scan): Not on file  5  HIV Screening: covered annually if you're between the age of 12-76  Also covered annually if you are younger than 13 and older than 72 with risk factors for HIV infection   For pregnant patients, it is covered up to 3 times per pregnancy  Immunizations:  Immunization Recommendations   Influenza Vaccine Annual influenza vaccination during flu season is recommended for all persons aged >= 6 months who do not have contraindications   Pneumococcal Vaccine (Prevnar and Pneumovax)  * Prevnar = PCV13  * Pneumovax = PPSV23   Adults 25-60 years old: 1-3 doses may be recommended based on certain risk factors  Adults 72 years old: Prevnar (PCV13) vaccine recommended followed by Pneumovax (PPSV23) vaccine  If already received PPSV23 since turning 65, then PCV13 recommended at least one year after PPSV23 dose  Hepatitis B Vaccine 3 dose series if at intermediate or high risk (ex: diabetes, end stage renal disease, liver disease)   Tetanus (Td) Vaccine - COST NOT COVERED BY MEDICARE PART B Following completion of primary series, a booster dose should be given every 10 years to maintain immunity against tetanus  Td may also be given as tetanus wound prophylaxis  Tdap Vaccine - COST NOT COVERED BY MEDICARE PART B Recommended at least once for all adults  For pregnant patients, recommended with each pregnancy  Shingles Vaccine (Shingrix) - COST NOT COVERED BY MEDICARE PART B  2 shot series recommended in those aged 48 and above     Health Maintenance Due:  There are no preventive care reminders to display for this patient  Immunizations Due:      Topic Date Due    Pneumococcal Vaccine: 65+ Years (2 of 2 - PPSV23) 09/19/2018    Influenza Vaccine  07/01/2020     Advance Directives   What are advance directives? Advance directives are legal documents that state your wishes and plans for medical care  These plans are made ahead of time in case you lose your ability to make decisions for yourself  Advance directives can apply to any medical decision, such as the treatments you want, and if you want to donate organs  What are the types of advance directives?   There are many types of advance directives, and each state has rules about how to use them  You may choose a combination of any of the following:  · Living will: This is a written record of the treatment you want  You can also choose which treatments you do not want, which to limit, and which to stop at a certain time  This includes surgery, medicine, IV fluid, and tube feedings  · Durable power of  for healthcare [facility]): This is a written record that states who you want to make healthcare choices for you when you are unable to make them for yourself  This person, called a proxy, is usually a family member or a friend  You may choose more than 1 proxy  · Do not resuscitate (DNR) order:  A DNR order is used in case your heart stops beating or you stop breathing  It is a request not to have certain forms of treatment, such as CPR  A DNR order may be included in other types of advance directives  · Medical directive: This covers the care that you want if you are in a coma, near death, or unable to make decisions for yourself  You can list the treatments you want for each condition  Treatment may include pain medicine, surgery, blood transfusions, dialysis, IV or tube feedings, and a ventilator (breathing machine)  · Values history: This document has questions about your views, beliefs, and how you feel and think about life  This information can help others choose the care that you would choose  Why are advance directives important? An advance directive helps you control your care  Although spoken wishes may be used, it is better to have your wishes written down  Spoken wishes can be misunderstood, or not followed  Treatments may be given even if you do not want them  An advance directive may make it easier for your family to make difficult choices about your care  Weight Management   Why it is important to manage your weight:  Being overweight increases your risk of health conditions such as heart disease, high blood pressure, type 2 diabetes, and certain types of cancer   It can also increase your risk for osteoarthritis, sleep apnea, and other respiratory problems  Aim for a slow, steady weight loss  Even a small amount of weight loss can lower your risk of health problems  How to lose weight safely:  A safe and healthy way to lose weight is to eat fewer calories and get regular exercise  You can lose up about 1 pound a week by decreasing the number of calories you eat by 500 calories each day  Healthy meal plan for weight management:  A healthy meal plan includes a variety of foods, contains fewer calories, and helps you stay healthy  A healthy meal plan includes the following:  · Eat whole-grain foods more often  A healthy meal plan should contain fiber  Fiber is the part of grains, fruits, and vegetables that is not broken down by your body  Whole-grain foods are healthy and provide extra fiber in your diet  Some examples of whole-grain foods are whole-wheat breads and pastas, oatmeal, brown rice, and bulgur  · Eat a variety of vegetables every day  Include dark, leafy greens such as spinach, kale, logan greens, and mustard greens  Eat yellow and orange vegetables such as carrots, sweet potatoes, and winter squash  · Eat a variety of fruits every day  Choose fresh or canned fruit (canned in its own juice or light syrup) instead of juice  Fruit juice has very little or no fiber  · Eat low-fat dairy foods  Drink fat-free (skim) milk or 1% milk  Eat fat-free yogurt and low-fat cottage cheese  Try low-fat cheeses such as mozzarella and other reduced-fat cheeses  · Choose meat and other protein foods that are low in fat  Choose beans or other legumes such as split peas or lentils  Choose fish, skinless poultry (chicken or turkey), or lean cuts of red meat (beef or pork)  Before you cook meat or poultry, cut off any visible fat  · Use less fat and oil  Try baking foods instead of frying them   Add less fat, such as margarine, sour cream, regular salad dressing and mayonnaise to foods  Eat fewer high-fat foods  Some examples of high-fat foods include french fries, doughnuts, ice cream, and cakes  · Eat fewer sweets  Limit foods and drinks that are high in sugar  This includes candy, cookies, regular soda, and sweetened drinks  Exercise:  Exercise at least 30 minutes per day on most days of the week  Some examples of exercise include walking, biking, dancing, and swimming  You can also fit in more physical activity by taking the stairs instead of the elevator or parking farther away from stores  Ask your healthcare provider about the best exercise plan for you  © Copyright [employer] 2018 Information is for End User's use only and may not be sold, redistributed or otherwise used for commercial purposes   All illustrations and images included in CareNotes® are the copyrighted property of A D A M , Inc  or [address] [place], [place] [address]pe

## 2020-09-21 NOTE — PROGRESS NOTES
Assessment and Plan:     Problem List Items Addressed This Visit        Digestive    Gastroesophageal reflux disease    Relevant Medications    omeprazole (PriLOSEC) 20 mg delayed release capsule       Cardiovascular and Mediastinum    Essential hypertension - Primary    Relevant Medications    valsartan (DIOVAN) 80 mg tablet    Other Relevant Orders    CBC and differential    Comprehensive metabolic panel       Other    Hiatal hernia    Hyperlipidemia    Relevant Orders    TSH, 3rd generation with Free T4 reflex    Lipid panel      Other Visit Diagnoses     Medicare annual wellness visit, subsequent               Preventive health issues were discussed with patient, and age appropriate screening tests were ordered as noted in patient's After Visit Summary  Personalized health advice and appropriate referrals for health education or preventive services given if needed, as noted in patient's After Visit Summary       History of Present Illness:     Patient presents for Medicare Annual Wellness visit    Patient Care Team:  Amando Lopez MD as PCP - General (Family Medicine)  MARIELOS Davis DO     Problem List:     Patient Active Problem List   Diagnosis    History of DVT of lower extremity    Essential hypertension    Fibrocystic breast changes    Gastroesophageal reflux disease    Hearing loss    Hiatal hernia    Hyperlipidemia    Varicose veins of left lower extremity    Bradycardia with 51-60 beats per minute      Past Medical and Surgical History:     Past Medical History:   Diagnosis Date    Acid reflux     DVT (deep venous thrombosis) ([facility] [address] ) 01/2018    Hearing loss     Hypercholesterolemia     Hypertension     Impingement syndrome of left shoulder 7/12/2019    Wears glasses      Past Surgical History:   Procedure Laterality Date    BREAST BIOPSY      1995 left, 1997 unknown side    TONSILLECTOMY      TUBAL LIGATION      WISDOM TOOTH EXTRACTION  1962      Family History: Family History   Problem Relation Age of Onset    Diabetes Mother     Hypertension Mother     Cancer Father     Diabetes Father     Hypertension Father     Colon cancer Father 72    Congenital heart disease Daughter     Hyperlipidemia Other         pure    Lymphoma Sister 72    Lung cancer Sister 62    Skin cancer Brother 72        Melanoma    No Known Problems Maternal Grandmother     No Known Problems Maternal Grandfather     No Known Problems Paternal Grandmother     No Known Problems Paternal Grandfather     Prostate cancer Brother 72    No Known Problems Daughter       Social History:     E-Cigarette/Vaping    E-Cigarette Use Never User      E-Cigarette/Vaping Substances    Nicotine No     THC No     CBD No     Flavoring No     Other No     Unknown No      Social History     Socioeconomic History    Marital status: /Civil Union     Spouse name: None    Number of children: None    Years of education: None    Highest education level: None   Occupational History    None   Social Needs    Financial resource strain: None    Food insecurity     Worry: None     Inability: None    Transportation needs     Medical: None     Non-medical: None   Tobacco Use    Smoking status: Never Smoker    Smokeless tobacco: Never Used   Substance and Sexual Activity    Alcohol use: Yes     Frequency: Monthly or less    Drug use: No    Sexual activity: Not Currently     Partners: Male     Birth control/protection: Post-menopausal, None   Lifestyle    Physical activity     Days per week: None     Minutes per session: None    Stress: None   Relationships    Social connections     Talks on phone: None     Gets together: None     Attends Tenriism service: None     Active member of club or organization: None     Attends meetings of clubs or organizations: None     Relationship status: None    Intimate partner violence     Fear of current or ex partner: None     Emotionally abused: None Physically abused: None     Forced sexual activity: None   Other Topics Concern    None   Social History Narrative    None      Medications and Allergies:     Current Outpatient Medications   Medication Sig Dispense Refill    amLODIPine (NORVASC) 5 mg tablet Take 1 tablet (5 mg total) by mouth 2 (two) times a day 180 tablet 3    Ascorbic Acid (VITAMIN C) 500 MG CAPS Take 1 capsule by mouth daily      Calcium Carbonate-Vitamin D3 (CALCIUM 600/VITAMIN D) 600-400 MG-UNIT TABS Take by mouth      Cholecalciferol (VITAMIN D) 2000 units tablet Take by mouth daily      folic acid (FOLVITE) 614 MCG tablet Take 1 tablet by mouth daily      hydrochlorothiazide (HYDRODIURIL) 25 mg tablet TAKE 1 TABLET BY MOUTH EVERY DAY 30 tablet 0    Omega-3 1000 MG CAPS Take by mouth daily      omeprazole (PriLOSEC) 20 mg delayed release capsule Take 1 capsule (20 mg total) by mouth daily 90 capsule 3    potassium chloride (MICRO-K) 10 MEQ CR capsule Take 1 capsule by mouth daily      simvastatin (ZOCOR) 20 mg tablet Take 1 tablet by mouth daily      valsartan (DIOVAN) 80 mg tablet Take 1 tablet (80 mg total) by mouth daily 90 tablet 3     No current facility-administered medications for this visit        Allergies   Allergen Reactions    Nebivolol Bradycardia    Sulfa Antibiotics Other (See Comments)     Unknown reaction     Eliquis [Apixaban] Rash    Motrin [Ibuprofen] GI Intolerance     If on prescription strength, over the counter she does fine with      Immunizations:     Immunization History   Administered Date(s) Administered    Hep A, adult 10/05/2019, 10/21/2019    INFLUENZA 09/26/2011, 09/17/2012, 10/21/2013, 10/03/2014, 10/08/2015, 10/18/2016, 09/19/2017, 10/09/2018, 09/26/2019    Pneumococcal Conjugate 13-Valent 12/16/2015, 09/19/2017    Pneumococcal Polysaccharide PPV23 10/17/2007    Tdap 09/19/2019    Zoster 03/18/2009    Zoster Vaccine Recombinant 09/05/2019, 01/20/2020, 03/12/2020      Health Maintenance: There are no preventive care reminders to display for this patient  Topic Date Due    Pneumococcal Vaccine: 65+ Years (2 of 2 - PPSV23) 09/19/2018    Influenza Vaccine  07/01/2020      Medicare Health Risk Assessment:     /88 (BP Location: Left arm, Patient Position: Sitting, Cuff Size: Large)   Pulse 62   Temp 97 5 °F (36 4 °C)   Ht 5' 4" (1 626 m)   Wt 93 kg (205 lb)   BMI 35 19 kg/m²      Lena Stratton is here for her Subsequent Wellness visit  Last Medicare Wellness visit information reviewed, patient interviewed and updates made to the record today  Health Risk Assessment:   Patient rates overall health as good  Patient feels that their physical health rating is same  Eyesight was rated as same  Hearing was rated as same  Patient feels that their emotional and mental health rating is same  Pain experienced in the last 7 days has been none  Patient states that she has experienced no weight loss or gain in last 6 months  Depression Screening:   PHQ-2 Score: 0      Fall Risk Screening: In the past year, patient has experienced: no history of falling in past year      Urinary Incontinence Screening:   Patient has not leaked urine accidently in the last six months  Home Safety:  Patient does not have trouble with stairs inside or outside of their home  Patient has working smoke alarms and has working carbon monoxide detector  Home safety hazards include: none  Nutrition:   Current diet is Regular  Medications:   Patient is currently taking over-the-counter supplements  OTC medications include: see medication list  Patient is able to manage medications  Activities of Daily Living (ADLs)/Instrumental Activities of Daily Living (IADLs):   Walk and transfer into and out of bed and chair?: Yes  Dress and groom yourself?: Yes    Bathe or shower yourself?: Yes    Feed yourself?  Yes  Do your laundry/housekeeping?: Yes  Manage your money, pay your bills and track your expenses?: Yes  Make your own meals?: Yes    Do your own shopping?: Yes    Previous Hospitalizations:   Any hospitalizations or ED visits within the last 12 months?: No      Advance Care Planning:   Living will: Yes    Advanced directive: Yes      PREVENTIVE SCREENINGS      Cardiovascular Screening:    General: Screening Not Indicated and History Lipid Disorder      Diabetes Screening:     General: Screening Current      Breast Cancer Screening:     General: Screening Current      Cervical Cancer Screening:    General: Screening Not Indicated      Lung Cancer Screening:     General: Screening Not Indicated      Ceasar Alvarado MD

## 2020-10-08 ENCOUNTER — HOSPITAL ENCOUNTER (OUTPATIENT)
Dept: RADIOLOGY | Age: 80
Discharge: HOME | End: 2020-10-08
Payer: MEDICARE

## 2020-10-08 VITALS — HEIGHT: 64 IN | WEIGHT: 200 LBS | BODY MASS INDEX: 34.15 KG/M2

## 2020-10-08 DIAGNOSIS — Z12.39 BREAST CANCER SCREENING: ICD-10-CM

## 2020-10-08 DIAGNOSIS — Z12.31 ENCOUNTER FOR SCREENING MAMMOGRAM FOR MALIGNANT NEOPLASM OF BREAST: ICD-10-CM

## 2020-10-08 PROCEDURE — 77067 SCR MAMMO BI INCL CAD: CPT

## 2020-10-08 PROCEDURE — 77063 BREAST TOMOSYNTHESIS BI: CPT

## 2020-10-28 ENCOUNTER — IMMUNIZATIONS (OUTPATIENT)
Dept: FAMILY MEDICINE CLINIC | Facility: CLINIC | Age: 80
End: 2020-10-28
Payer: MEDICARE

## 2020-10-28 DIAGNOSIS — Z23 ENCOUNTER FOR IMMUNIZATION: Primary | ICD-10-CM

## 2020-10-28 PROCEDURE — 90662 IIV NO PRSV INCREASED AG IM: CPT

## 2020-10-28 PROCEDURE — G0008 ADMIN INFLUENZA VIRUS VAC: HCPCS

## 2020-11-03 ENCOUNTER — TELEPHONE (OUTPATIENT)
Dept: HEMATOLOGY ONCOLOGY | Facility: CLINIC | Age: 80
End: 2020-11-03

## 2020-11-05 ENCOUNTER — OFFICE VISIT (OUTPATIENT)
Dept: SURGICAL ONCOLOGY | Facility: CLINIC | Age: 80
End: 2020-11-05
Payer: MEDICARE

## 2020-11-05 VITALS
WEIGHT: 205 LBS | BODY MASS INDEX: 35 KG/M2 | TEMPERATURE: 97.3 F | HEIGHT: 64 IN | HEART RATE: 55 BPM | SYSTOLIC BLOOD PRESSURE: 150 MMHG | DIASTOLIC BLOOD PRESSURE: 86 MMHG

## 2020-11-05 DIAGNOSIS — N60.19 FIBROCYSTIC BREAST CHANGES, UNSPECIFIED LATERALITY: Primary | ICD-10-CM

## 2020-11-05 DIAGNOSIS — Z12.31 SCREENING MAMMOGRAM, ENCOUNTER FOR: ICD-10-CM

## 2020-11-05 PROCEDURE — 99213 OFFICE O/P EST LOW 20 MIN: CPT | Performed by: SURGERY

## 2020-12-07 DIAGNOSIS — E78.00 PURE HYPERCHOLESTEROLEMIA: Primary | ICD-10-CM

## 2020-12-07 RX ORDER — SIMVASTATIN 20 MG
20 TABLET ORAL DAILY
Qty: 90 TABLET | Refills: 1 | Status: SHIPPED | OUTPATIENT
Start: 2020-12-07 | End: 2021-05-27

## 2021-01-22 DIAGNOSIS — Z23 ENCOUNTER FOR IMMUNIZATION: ICD-10-CM

## 2021-02-22 DIAGNOSIS — I10 ESSENTIAL HYPERTENSION: Primary | ICD-10-CM

## 2021-02-22 RX ORDER — POTASSIUM CHLORIDE 750 MG/1
10 CAPSULE, EXTENDED RELEASE ORAL DAILY
Qty: 30 CAPSULE | Refills: 3 | Status: SHIPPED | OUTPATIENT
Start: 2021-02-22 | End: 2021-02-23

## 2021-02-22 NOTE — TELEPHONE ENCOUNTER
Patient requests refill on Potassium Chloride 10 mEq called to [phone]  This was prescribed by a former provider

## 2021-02-23 ENCOUNTER — TELEPHONE (OUTPATIENT)
Dept: FAMILY MEDICINE CLINIC | Facility: CLINIC | Age: 81
End: 2021-02-23

## 2021-02-23 DIAGNOSIS — I10 ESSENTIAL HYPERTENSION: Primary | ICD-10-CM

## 2021-02-23 RX ORDER — POTASSIUM CHLORIDE 750 MG/1
10 TABLET, EXTENDED RELEASE ORAL DAILY
Qty: 30 TABLET | Refills: 3 | Status: SHIPPED | OUTPATIENT
Start: 2021-02-23 | End: 2021-03-29

## 2021-02-23 NOTE — TELEPHONE ENCOUNTER
Brittney Esquivel [place] [facility] [place] to change [place] for potassium to 10 mEq tablets - he said capsules were sent

## 2021-02-28 ENCOUNTER — IMMUNIZATIONS (OUTPATIENT)
Dept: FAMILY MEDICINE CLINIC | Facility: HOSPITAL | Age: 81
End: 2021-02-28

## 2021-02-28 DIAGNOSIS — Z23 ENCOUNTER FOR IMMUNIZATION: Primary | ICD-10-CM

## 2021-02-28 PROCEDURE — 0001A SARS-COV-2 / COVID-19 MRNA VACCINE (PFIZER-BIONTECH) 30 MCG: CPT

## 2021-02-28 PROCEDURE — 91300 SARS-COV-2 / COVID-19 MRNA VACCINE (PFIZER-BIONTECH) 30 MCG: CPT

## 2021-03-18 ENCOUNTER — APPOINTMENT (OUTPATIENT)
Dept: LAB | Facility: CLINIC | Age: 81
End: 2021-03-18
Payer: MEDICARE

## 2021-03-18 ENCOUNTER — TRANSCRIBE ORDERS (OUTPATIENT)
Dept: LAB | Facility: CLINIC | Age: 81
End: 2021-03-18

## 2021-03-18 LAB
ALBUMIN SERPL BCP-MCNC: 3.9 G/DL (ref 3.5–5)
ALP SERPL-CCNC: 94 U/L (ref 46–116)
ALT SERPL W P-5'-P-CCNC: 34 U/L (ref 12–78)
ANION GAP SERPL CALCULATED.3IONS-SCNC: 3 MMOL/L (ref 4–13)
AST SERPL W P-5'-P-CCNC: 34 U/L (ref 5–45)
BASOPHILS # BLD AUTO: 0.04 THOUSANDS/ΜL (ref 0–0.1)
BASOPHILS NFR BLD AUTO: 1 % (ref 0–1)
BILIRUB SERPL-MCNC: 0.77 MG/DL (ref 0.2–1)
BUN SERPL-MCNC: 18 MG/DL (ref 5–25)
CALCIUM SERPL-MCNC: 9.2 MG/DL (ref 8.3–10.1)
CHLORIDE SERPL-SCNC: 104 MMOL/L (ref 100–108)
CHOLEST SERPL-MCNC: 196 MG/DL (ref 50–200)
CO2 SERPL-SCNC: 30 MMOL/L (ref 21–32)
CREAT SERPL-MCNC: 0.93 MG/DL (ref 0.6–1.3)
EOSINOPHIL # BLD AUTO: 0.07 THOUSAND/ΜL (ref 0–0.61)
EOSINOPHIL NFR BLD AUTO: 1 % (ref 0–6)
ERYTHROCYTE [DISTWIDTH] IN BLOOD BY AUTOMATED COUNT: 13.3 % (ref 11.6–15.1)
GFR SERPL CREATININE-BSD FRML MDRD: 59 ML/MIN/1.73SQ M
GLUCOSE P FAST SERPL-MCNC: 87 MG/DL (ref 65–99)
HCT VFR BLD AUTO: 45.4 % (ref 34.8–46.1)
HDLC SERPL-MCNC: 67 MG/DL
HGB BLD-MCNC: 14.6 G/DL (ref 11.5–15.4)
IMM GRANULOCYTES # BLD AUTO: 0.01 THOUSAND/UL (ref 0–0.2)
IMM GRANULOCYTES NFR BLD AUTO: 0 % (ref 0–2)
LDLC SERPL CALC-MCNC: 112 MG/DL (ref 0–100)
LYMPHOCYTES # BLD AUTO: 1.66 THOUSANDS/ΜL (ref 0.6–4.47)
LYMPHOCYTES NFR BLD AUTO: 29 % (ref 14–44)
MCH RBC QN AUTO: 27.7 PG (ref 26.8–34.3)
MCHC RBC AUTO-ENTMCNC: 32.2 G/DL (ref 31.4–37.4)
MCV RBC AUTO: 86 FL (ref 82–98)
MONOCYTES # BLD AUTO: 0.6 THOUSAND/ΜL (ref 0.17–1.22)
MONOCYTES NFR BLD AUTO: 10 % (ref 4–12)
NEUTROPHILS # BLD AUTO: 3.4 THOUSANDS/ΜL (ref 1.85–7.62)
NEUTS SEG NFR BLD AUTO: 59 % (ref 43–75)
NONHDLC SERPL-MCNC: 129 MG/DL
NRBC BLD AUTO-RTO: 0 /100 WBCS
PLATELET # BLD AUTO: 192 THOUSANDS/UL (ref 149–390)
PMV BLD AUTO: 11.6 FL (ref 8.9–12.7)
POTASSIUM SERPL-SCNC: 4.6 MMOL/L (ref 3.5–5.3)
PROT SERPL-MCNC: 6.8 G/DL (ref 6.4–8.2)
RBC # BLD AUTO: 5.27 MILLION/UL (ref 3.81–5.12)
SODIUM SERPL-SCNC: 137 MMOL/L (ref 136–145)
TRIGL SERPL-MCNC: 83 MG/DL
TSH SERPL DL<=0.05 MIU/L-ACNC: 1.55 UIU/ML (ref 0.36–3.74)
WBC # BLD AUTO: 5.78 THOUSAND/UL (ref 4.31–10.16)

## 2021-03-18 PROCEDURE — 80053 COMPREHEN METABOLIC PANEL: CPT | Performed by: FAMILY MEDICINE

## 2021-03-18 PROCEDURE — 36415 COLL VENOUS BLD VENIPUNCTURE: CPT | Performed by: FAMILY MEDICINE

## 2021-03-18 PROCEDURE — 80061 LIPID PANEL: CPT | Performed by: FAMILY MEDICINE

## 2021-03-18 PROCEDURE — 84443 ASSAY THYROID STIM HORMONE: CPT | Performed by: FAMILY MEDICINE

## 2021-03-18 PROCEDURE — 85025 COMPLETE CBC W/AUTO DIFF WBC: CPT | Performed by: FAMILY MEDICINE

## 2021-03-21 ENCOUNTER — IMMUNIZATIONS (OUTPATIENT)
Dept: FAMILY MEDICINE CLINIC | Facility: HOSPITAL | Age: 81
End: 2021-03-21

## 2021-03-21 DIAGNOSIS — Z23 ENCOUNTER FOR IMMUNIZATION: Primary | ICD-10-CM

## 2021-03-21 PROCEDURE — 91300 SARS-COV-2 / COVID-19 MRNA VACCINE (PFIZER-BIONTECH) 30 MCG: CPT

## 2021-03-21 PROCEDURE — 0002A SARS-COV-2 / COVID-19 MRNA VACCINE (PFIZER-BIONTECH) 30 MCG: CPT

## 2021-03-29 ENCOUNTER — OFFICE VISIT (OUTPATIENT)
Dept: FAMILY MEDICINE CLINIC | Facility: CLINIC | Age: 81
End: 2021-03-29
Payer: MEDICARE

## 2021-03-29 VITALS
DIASTOLIC BLOOD PRESSURE: 86 MMHG | HEIGHT: 64 IN | SYSTOLIC BLOOD PRESSURE: 132 MMHG | BODY MASS INDEX: 35.68 KG/M2 | RESPIRATION RATE: 16 BRPM | WEIGHT: 209 LBS | HEART RATE: 64 BPM | TEMPERATURE: 96.2 F

## 2021-03-29 DIAGNOSIS — K21.9 GASTROESOPHAGEAL REFLUX DISEASE WITHOUT ESOPHAGITIS: ICD-10-CM

## 2021-03-29 DIAGNOSIS — I10 ESSENTIAL HYPERTENSION: Primary | ICD-10-CM

## 2021-03-29 DIAGNOSIS — K44.9 HIATAL HERNIA: ICD-10-CM

## 2021-03-29 DIAGNOSIS — E78.00 PURE HYPERCHOLESTEROLEMIA: ICD-10-CM

## 2021-03-29 DIAGNOSIS — E66.01 OBESITY, MORBID (HCC): ICD-10-CM

## 2021-03-29 PROBLEM — Z12.31 SCREENING MAMMOGRAM, ENCOUNTER FOR: Status: RESOLVED | Noted: 2020-11-05 | Resolved: 2021-03-29

## 2021-03-29 PROCEDURE — 99214 OFFICE O/P EST MOD 30 MIN: CPT | Performed by: FAMILY MEDICINE

## 2021-03-29 RX ORDER — POTASSIUM CHLORIDE 750 MG/1
10 TABLET, EXTENDED RELEASE ORAL DAILY
Qty: 90 TABLET | Refills: 3 | Status: SHIPPED | OUTPATIENT
Start: 2021-03-29 | End: 2022-05-12 | Stop reason: SDUPTHER

## 2021-03-29 NOTE — PROGRESS NOTES
Assessment/Plan:     Diagnoses and all orders for this visit:    Essential hypertension  -     potassium chloride (K-DUR,KLOR-CON) 10 mEq tablet; Take 1 tablet (10 mEq total) by mouth daily  -     CBC and differential  -     Comprehensive metabolic panel    Pure hypercholesterolemia  -     Lipid panel    Gastroesophageal reflux disease without esophagitis    Hiatal hernia    Obesity, morbid ([address] )          Continue with current medications  Monitor blood pressures at home  Patient has leeway to  increase her Valsartan dose  Information provided to patient re medical weight loss program  OV   Six months with repeat labs at that time  She is due for colonoscopy  BMI Counseling: Body mass index is 35 87 kg/m²  The BMI is above normal  Nutrition recommendations include reducing portion sizes, decreasing overall calorie intake, consuming healthier snacks, moderation in carbohydrate intake, reducing intake of saturated fat and trans fat and reducing intake of cholesterol  Exercise recommendations include exercising 3-5 times per week  Patient ID: Cristhian Sarmiento is a 68 y o  female  Follow up visit  Medications reviewed  Hypertension on Amlodipine 5 mg daily, Valsartan 80 mg daily and HCTZ 25 mg daily  Recent BP readings at  home 130-140/70-80 range  Labs 03/2020 Creatinine 0 93  Electrolytes normal  K+ 4 6  on K+ supplement  Hgb 14  6  04/2019 UA no proteinuria  Hyperlipidemia on Simvastatin 20 mg/day 03/2021 lipid profile cholesterol 196 decreased from 203  Triglycerides 83  HDL 67    LFTs normal  FBS 87         Recent Results (from the past 1008 hour(s))   CBC and differential    Collection Time: 03/18/21 10:43 AM   Result Value Ref Range    WBC 5 78 4 31 - 10 16 Thousand/uL    RBC 5 27 (H) 3 81 - 5 12 Million/uL    Hemoglobin 14 6 11 5 - 15 4 g/dL    Hematocrit 45 4 34 8 - 46 1 %    MCV 86 82 - 98 fL    MCH 27 7 26 8 - 34 3 pg    MCHC 32 2 31 4 - 37 4 g/dL    RDW 13 3 11 6 - 15 1 %    MPV 11 6 8 9 - 12 7 fL    Platelets 206 318 - 098 Thousands/uL    nRBC 0 /100 WBCs    Neutrophils Relative 59 43 - 75 %    Immat GRANS % 0 0 - 2 %    Lymphocytes Relative 29 14 - 44 %    Monocytes Relative 10 4 - 12 %    Eosinophils Relative 1 0 - 6 %    Basophils Relative 1 0 - 1 %    Neutrophils Absolute 3 40 1 85 - 7 62 Thousands/µL    Immature Grans Absolute 0 01 0 00 - 0 20 Thousand/uL    Lymphocytes Absolute 1 66 0 60 - 4 47 Thousands/µL    Monocytes Absolute 0 60 0 17 - 1 22 Thousand/µL    Eosinophils Absolute 0 07 0 00 - 0 61 Thousand/µL    Basophils Absolute 0 04 0 00 - 0 10 Thousands/µL   Comprehensive metabolic panel    Collection Time: 03/18/21 10:43 AM   Result Value Ref Range    Sodium 137 136 - 145 mmol/L    Potassium 4 6 3 5 - 5 3 mmol/L    Chloride 104 100 - 108 mmol/L    CO2 30 21 - 32 mmol/L    ANION GAP 3 (L) 4 - 13 mmol/L    BUN 18 5 - 25 mg/dL    Creatinine 0 93 0 60 - 1 30 mg/dL    Glucose, Fasting 87 65 - 99 mg/dL    Calcium 9 2 8 3 - 10 1 mg/dL    AST 34 5 - 45 U/L    ALT 34 12 - 78 U/L    Alkaline Phosphatase 94 46 - 116 U/L    Total Protein 6 8 6 4 - 8 2 g/dL    Albumin 3 9 3 5 - 5 0 g/dL    Total Bilirubin 0 77 0 20 - 1 00 mg/dL    eGFR 59 ml/min/1 73sq m   TSH, 3rd generation with Free T4 reflex    Collection Time: 03/18/21 10:43 AM   Result Value Ref Range    TSH 3RD GENERATON 1 550 0 358 - 3 740 uIU/mL   Lipid panel    Collection Time: 03/18/21 10:43 AM   Result Value Ref Range    Cholesterol 196 50 - 200 mg/dL    Triglycerides 83 <=150 mg/dL    HDL, Direct 67 >=40 mg/dL    LDL Calculated 112 (H) 0 - 100 mg/dL    Non-HDL-Chol (CHOL-HDL) 129 mg/dl         The following portions of the patient's history were reviewed and updated as appropriate: allergies, current medications, past family history, past medical history, past social history, past surgical history and problem list     Review of Systems   Constitutional: Negative for appetite change, chills, fatigue, fever and unexpected weight change  HENT: Positive for hearing loss (bilateral hearing aids  )  Negative for congestion, ear pain, postnasal drip, rhinorrhea, sore throat and trouble swallowing  Prior ENT evaluation for recurrent sore throat LPR   Eyes: Negative for visual disturbance  Respiratory: Negative for cough, shortness of breath and wheezing  Cardiovascular: Negative for chest pain, palpitations and leg swelling  History of DVT left leg treated with Eliquis  No recurrence  No FH venous thrombosis  Gastrointestinal: Negative for abdominal pain, blood in stool, constipation, diarrhea, nausea and vomiting  GERD stable on Omeprazole 20 mg/day  Occasional reflux symptoms depending what she eats  No dysphagia  EGD 2014  No history of Mauro's    Endocrine:        Lab Results       Component                Value               Date                       [id number]             3 940 (H)           06/15/2020            Repeat TSH 03/2021 1 550   Genitourinary: Negative for difficulty urinating  Musculoskeletal: Negative for arthralgias and myalgias  History of recurrent left lumbar radiculopathy currently asymptomatic  Prior PT  She uses prn Advil  Skin: Negative for rash  Neurological: Negative for dizziness and headaches  Hematological: Negative for adenopathy  Does not bruise/bleed easily  Psychiatric/Behavioral: Negative for dysphoric mood and sleep disturbance  Objective:    /86 (BP Location: Left arm, Patient Position: Sitting, Cuff Size: Large)   Pulse 64   Temp (!) 96 2 °F (35 7 °C)   Resp 16   Ht 5' 4" (1 626 m)   Wt 94 8 kg (209 lb)   BMI 35 87 kg/m²     BP Readings from Last 3 Encounters:   03/29/21 132/86   11/05/20 150/86   09/21/20 136/88     Wt Readings from Last 3 Encounters:   03/29/21 94 8 kg (209 lb)   11/05/20 93 kg (205 lb)   10/08/20 90 7 kg (200 lb)        Physical Exam  Vitals signs and nursing note reviewed     Constitutional:       General: She is not in acute distress  Appearance: She is well-developed  HENT:      Right Ear: External ear normal       Left Ear: External ear normal    Eyes:      General: No scleral icterus  Extraocular Movements: Extraocular movements intact  Conjunctiva/sclera: Conjunctivae normal       Pupils: Pupils are equal, round, and reactive to light  Neck:      Thyroid: No thyroid mass or thyromegaly  Vascular: No carotid bruit or JVD  Trachea: No tracheal deviation  Cardiovascular:      Rate and Rhythm: Normal rate and regular rhythm  Heart sounds: Normal heart sounds  No murmur  No gallop  Pulmonary:      Effort: Pulmonary effort is normal  No respiratory distress  Breath sounds: Normal breath sounds  No wheezing or rales  Abdominal:      General: Bowel sounds are normal  There is no distension or abdominal bruit  Palpations: Abdomen is soft  There is no hepatomegaly, splenomegaly or mass  Tenderness: There is no abdominal tenderness  There is no guarding or rebound  Musculoskeletal:      Right lower leg: No edema  Left lower leg: No edema  Lymphadenopathy:      Cervical: No cervical adenopathy  Upper Body:      Right upper body: No supraclavicular adenopathy  Left upper body: No supraclavicular adenopathy  Skin:     Findings: No rash  Nails: There is no clubbing  Neurological:      General: No focal deficit present  Mental Status: She is alert and oriented to person, place, and time     Psychiatric:         Mood and Affect: Mood normal

## 2021-05-14 DIAGNOSIS — I10 HYPERTENSION, UNSPECIFIED TYPE: ICD-10-CM

## 2021-05-14 DIAGNOSIS — E78.00 PURE HYPERCHOLESTEROLEMIA: ICD-10-CM

## 2021-05-14 DIAGNOSIS — I10 ESSENTIAL HYPERTENSION: ICD-10-CM

## 2021-05-14 RX ORDER — AMLODIPINE BESYLATE 5 MG/1
TABLET ORAL
Qty: 180 TABLET | Refills: 3 | Status: SHIPPED | OUTPATIENT
Start: 2021-05-14 | End: 2022-05-11

## 2021-05-17 ENCOUNTER — ANNUAL EXAM (OUTPATIENT)
Dept: OBGYN CLINIC | Facility: CLINIC | Age: 81
End: 2021-05-17
Payer: MEDICARE

## 2021-05-17 VITALS
SYSTOLIC BLOOD PRESSURE: 130 MMHG | WEIGHT: 207 LBS | BODY MASS INDEX: 35.34 KG/M2 | DIASTOLIC BLOOD PRESSURE: 84 MMHG | HEIGHT: 64 IN

## 2021-05-17 DIAGNOSIS — Z01.419 ENCOUNTER FOR ANNUAL ROUTINE GYNECOLOGICAL EXAMINATION: Primary | ICD-10-CM

## 2021-05-17 DIAGNOSIS — Z12.31 ENCOUNTER FOR SCREENING MAMMOGRAM FOR BREAST CANCER: ICD-10-CM

## 2021-05-17 PROCEDURE — G0101 CA SCREEN;PELVIC/BREAST EXAM: HCPCS | Performed by: OBSTETRICS & GYNECOLOGY

## 2021-05-17 NOTE — PROGRESS NOTES
Assessment/Plan:    Pap smear deferred due to low risk  Encouraged self-breast examination as well as calcium supplementation  She will continue to follow-up with her breast specialist as scheduled  Continue annual 3D mammogram   Reviewed colon cancer screening, up-to-date with follow-up in 5 year colonoscopy  She will continue to follow-up with her primary care physician and cardiologist as scheduled  Return to office in 1-2 years per Medicare recommendations/protocol  No problem-specific Assessment & Plan notes found for this encounter  Diagnoses and all orders for this visit:    Encounter for annual routine gynecological examination    Encounter for screening mammogram for breast cancer  -     Mammo screening bilateral w 3d & cad; Future    Other orders  -     Cancel: Mammo screening bilateral w 3d & cad; Future          Subjective:      Patient ID: Brooklynn Linn is a 68 y o  female  HPI       This is a very pleasant 72-year-old female  ( x3) presents for annual gyn exam   Patient went through menopause at age 48  She was on hormone replacement therapy for approximately 2 years  She denies any vaginal bleeding or spotting  There has been no changes in bowel or bladder function  She has been in a monogamous relationship with her  for over 46 years  All Pap smears were normal     She does see a breast specialist once a year, history of fibrocystic breast, breast biopsy benign  Last colonoscopy 2021, normal     The following portions of the patient's history were reviewed and updated as appropriate: allergies, current medications, past family history, past medical history, past social history, past surgical history and problem list     Review of Systems   Constitutional: Negative for fatigue, fever and unexpected weight change  Respiratory: Negative for cough, chest tightness, shortness of breath and wheezing  Cardiovascular: Negative    Negative for chest pain and palpitations  Gastrointestinal: Negative  Negative for abdominal distention, abdominal pain, blood in stool, constipation, diarrhea, nausea and vomiting  Genitourinary: Negative  Negative for difficulty urinating, dyspareunia, dysuria, flank pain, frequency, genital sores, hematuria, pelvic pain, urgency, vaginal bleeding, vaginal discharge and vaginal pain  Skin: Negative for rash  Objective:      /84   Ht 5' 4" (1 626 m)   Wt 93 9 kg (207 lb)   BMI 35 53 kg/m²          Physical Exam  Constitutional:       Appearance: Normal appearance  She is well-developed  Cardiovascular:      Rate and Rhythm: Normal rate and regular rhythm  Pulmonary:      Effort: Pulmonary effort is normal       Breath sounds: Normal breath sounds  Chest:      Breasts:         Right: No inverted nipple, mass, nipple discharge, skin change or tenderness  Left: No inverted nipple, mass, nipple discharge, skin change or tenderness  Abdominal:      General: Bowel sounds are normal  There is no distension  Palpations: Abdomen is soft  Tenderness: There is no abdominal tenderness  There is no guarding or rebound  Genitourinary:     Labia:         Right: No rash, tenderness or lesion  Left: No rash, tenderness or lesion  Vagina: Normal  No signs of injury  No vaginal discharge, tenderness or lesions  Cervix: No cervical motion tenderness, discharge, friability, lesion, erythema or cervical bleeding  Uterus: Not enlarged and not tender  Adnexa:         Right: No mass, tenderness or fullness  Left: No mass, tenderness or fullness  Comments: External genitalia is within normal limits  The vagina is evident of estrogen deficiency  There is no pelvic floor prolapse  Rectovaginal exam is confirmatory  Skin:     General: Skin is warm and dry  Neurological:      Mental Status: She is alert and oriented to person, place, and time

## 2021-05-23 DIAGNOSIS — I10 ESSENTIAL HYPERTENSION: ICD-10-CM

## 2021-05-23 RX ORDER — HYDROCHLOROTHIAZIDE 25 MG/1
TABLET ORAL
Qty: 90 TABLET | Refills: 3 | Status: SHIPPED | OUTPATIENT
Start: 2021-05-23 | End: 2022-05-11

## 2021-05-27 DIAGNOSIS — E78.00 PURE HYPERCHOLESTEROLEMIA: ICD-10-CM

## 2021-05-27 RX ORDER — SIMVASTATIN 20 MG
TABLET ORAL
Qty: 90 TABLET | Refills: 1 | Status: SHIPPED | OUTPATIENT
Start: 2021-05-27 | End: 2021-11-21

## 2021-07-01 ENCOUNTER — OFFICE VISIT (OUTPATIENT)
Dept: CARDIOLOGY CLINIC | Facility: CLINIC | Age: 81
End: 2021-07-01
Payer: MEDICARE

## 2021-07-01 VITALS
BODY MASS INDEX: 34.15 KG/M2 | WEIGHT: 200 LBS | HEIGHT: 64 IN | HEART RATE: 54 BPM | DIASTOLIC BLOOD PRESSURE: 80 MMHG | SYSTOLIC BLOOD PRESSURE: 150 MMHG

## 2021-07-01 DIAGNOSIS — E78.00 PURE HYPERCHOLESTEROLEMIA: ICD-10-CM

## 2021-07-01 DIAGNOSIS — I10 ESSENTIAL HYPERTENSION: Primary | ICD-10-CM

## 2021-07-01 DIAGNOSIS — R00.1 BRADYCARDIA: ICD-10-CM

## 2021-07-01 DIAGNOSIS — Z86.718 HISTORY OF DVT OF LOWER EXTREMITY: ICD-10-CM

## 2021-07-01 PROCEDURE — 93000 ELECTROCARDIOGRAM COMPLETE: CPT | Performed by: PHYSICIAN ASSISTANT

## 2021-07-01 PROCEDURE — 99214 OFFICE O/P EST MOD 30 MIN: CPT | Performed by: PHYSICIAN ASSISTANT

## 2021-07-01 NOTE — PROGRESS NOTES
Electrophysiology 1 Year Follow Up  Heart & Vascular [facility] Cardiology [facility]  [address] [place] [address], [place], [address] [facility]    Name: Yolie Manning  : 1940  MRN: [id number]    ASSESSMENT:  1  Essential hypertension     2  Pure hypercholesterolemia     3  History of DVT of lower extremity     4  Bradycardia         PLAN:  1  Baseline bradycardia, asymptomatic   - not on AV dian blocking agents as an outpatient  2  Essential hypertension  - Maintained on amlodipine 5 mg twice daily and valsartan 80 mg daily   3  Bilateral lower extremity edema  - maintained on hydrochlorothiazide as an outpatient  4  Hyperlipidemia   - maintained on simvastatin 20 mg daily  - last labs 3/2021: total 196 / TRI 83 /  / HDL 67   5  History of DVT (2018)    IMPRESSION:  1  Hypertension - Patient's blood pressure is elevated but upon recheck is slightly lower at 142/72  She reports that they were lower at home and usually brings in a chart of her readings that she obtains prior to this visit  Therefore, when she gets back from vacation in a week she will take her blood pressures and send them to the office for review  We will let her know if there is any need to change her current regiment at that point  2  Bradycardia - she was advised to let us know of any change in her exercise tolerance, lightheadedness, or dizziness with slow heart rates picked up on her monitor  However, she is still able to be very active and does not warrant a pacemaker at this time  Will continue to monitor  Patient is to follow up in our EP office in 1 year  She is to call our office with any further questions or concerns in the meantime  HPI:   Interim history: Yolie Manning is a 68 y o  female with baseline bradycardia, essential hypertension, bilateral lower extremity edema, hyperlipidemia, and history of DVT  She presents today for 1 year follow up  Patient follows with Dr Dhruv White for her cardiac issues    In 2016 she did have issues with blood pressure for which she did have an ultrasound of her kidneys to rule out renal artery stenosis  This was ruled out and 3 year she has been placed on the correct blood pressure regimen of amlodipine and valsartan  She did also have a DVT in 2018 when driving to [place]  It was her most recent follow-up was in May of 2020 for which her cardiac issues were stable  In looking at her labs, she did have elevated TSH last year but was repeated earlier this year and was within normal limits  Lipid panel was reviewed as well  She reports that she has been doing well since she was last seen in the office  She did use to be active with her  in going to the gym but since [facility], she has not been doing that as much  Instead, her and a friend in the neighborhood have been walking religiously in the morning more than 5 times a week for an hour or 2! She reports that she normally keeps track of her blood pressures for couple weeks prior to her visits but she did forget prior to this visit  EKG:  Sinus bradycardia at 54 beats per minute, left axis deviation, a single PAC noted on rhythm strip    ROS:   Review of Systems   Constitutional: Negative for chills, fever and malaise/fatigue  Cardiovascular: Negative for chest pain, cyanosis, dyspnea on exertion, irregular heartbeat, leg swelling, near-syncope, orthopnea, palpitations, paroxysmal nocturnal dyspnea and syncope  Respiratory: Negative for shortness of breath and sleep disturbances due to breathing  Neurological: Negative for dizziness and light-headedness  OBJECTIVE:   Vitals:   /80 (BP Location: Left arm, Patient Position: Sitting, Cuff Size: Large)   Pulse (!) 54   Ht 5' 4" (1 626 m)   Wt 90 7 kg (200 lb)   BMI 34 33 kg/m²       Physical Exam:   Physical Exam  Vitals and nursing note reviewed  Constitutional:       General: She is not in acute distress       Appearance: She is well-developed  She is not diaphoretic  HENT:      Head: Normocephalic and atraumatic  Eyes:      Pupils: Pupils are equal, round, and reactive to light  Neck:      Vascular: No JVD  Cardiovascular:      Rate and Rhythm: Regular rhythm  Bradycardia present  Heart sounds: No murmur heard  No friction rub  No gallop  Pulmonary:      Effort: Pulmonary effort is normal  No respiratory distress  Breath sounds: Normal breath sounds  No wheezing  Abdominal:      Palpations: Abdomen is soft  Musculoskeletal:         General: Normal range of motion  Cervical back: Normal range of motion  Skin:     General: Skin is warm and dry  Neurological:      Mental Status: She is alert and oriented to person, place, and time           Medications:      Current Outpatient Medications:     amLODIPine (NORVASC) 5 mg tablet, TAKE 1 TABLET BY MOUTH TWO TIMES DAILY, Disp: 180 tablet, Rfl: 3    Ascorbic Acid (VITAMIN C) 500 MG CAPS, Take 1 capsule by mouth daily, Disp: , Rfl:     Calcium Carbonate-Vitamin D3 (CALCIUM 600/VITAMIN D) 600-400 MG-UNIT TABS, Take by mouth, Disp: , Rfl:     Cholecalciferol (VITAMIN D) 2000 units tablet, Take by mouth daily, Disp: , Rfl:     famotidine (PEPCID) 20 mg tablet, Take 20 mg by mouth daily, Disp: , Rfl:     folic acid (FOLVITE) 779 MCG tablet, Take 1 tablet by mouth daily, Disp: , Rfl:     hydrochlorothiazide (HYDRODIURIL) 25 mg tablet, TAKE 1 TABLET BY MOUTH EVERY DAY, Disp: 90 tablet, Rfl: 3    ofloxacin (FLOXIN) 0 3 % otic solution, 4 gtts to affected ear bid x 7 days (Patient not taking: Reported on 5/17/2021), Disp: 5 mL, Rfl: 0    omeprazole (PriLOSEC) 20 mg delayed release capsule, Take 1 capsule (20 mg total) by mouth daily (Patient not taking: Reported on 6/3/2021), Disp: 90 capsule, Rfl: 3    potassium chloride (K-DUR,KLOR-CON) 10 mEq tablet, Take 1 tablet (10 mEq total) by mouth daily, Disp: 90 tablet, Rfl: 3    simvastatin (ZOCOR) 20 mg tablet, TAKE 1 TABLET BY MOUTH EVERY DAY, Disp: 90 tablet, Rfl: 1    valsartan (DIOVAN) 80 mg tablet, Take 1 tablet (80 mg total) by mouth daily, Disp: 90 tablet, Rfl: 3     Family History   Problem Relation Age of Onset    Diabetes Mother     Hypertension Mother     Cancer Father     Diabetes Father     Hypertension Father     Colon cancer Father 72    Congenital heart disease Daughter     Hyperlipidemia Other         pure    Lymphoma Sister 72    Lung cancer Sister 62    Skin cancer Brother 72        Melanoma    No Known Problems Maternal Grandmother     No Known Problems Maternal Grandfather     No Known Problems Paternal Grandmother     No Known Problems Paternal Grandfather     Prostate cancer Brother 72    No Known Problems Daughter      Social History     Socioeconomic History    Marital status: /Civil Union     Spouse name: Not on file    Number of children: Not on file    Years of education: Not on file    Highest education level: Not on file   Occupational History    Not on file   Tobacco Use    Smoking status: Never Smoker    Smokeless tobacco: Never Used   Vaping Use    Vaping Use: Never used   Substance and Sexual Activity    Alcohol use: Yes    Drug use: No    Sexual activity: Not Currently     Partners: Male     Birth control/protection: Post-menopausal, None   Other Topics Concern    Not on file   Social History Narrative    Not on file     Social Determinants of Health     Financial Resource Strain:     Difficulty of Paying Living Expenses:    Food Insecurity:     Worried About Running Out of Food in the Last Year:     [address] in the Last Year:    Transportation Needs:     Lack of Transportation (Medical):      Lack of Transportation (Non-Medical):    Physical Activity:     Days of Exercise per Week:     Minutes of Exercise per Session:    Stress:     Feeling of Stress :    Social Connections:     Frequency of Communication with Friends and Family:     Frequency of Social Gatherings with Friends and Family:     Attends Buddhist Services:     Active Member of Clubs or Organizations:     Attends Club or Organization Meetings:     Marital Status:    Intimate Partner Violence:     Fear of Current or Ex-Partner:     Emotionally Abused:     Physically Abused:     Sexually Abused:      Social History     Tobacco Use   Smoking Status Never Smoker   Smokeless Tobacco Never Used     Social History     Substance and Sexual Activity   Alcohol Use Yes       Labs & Results:  Below is the patient's most recent value for Albumin, ALT, AST, BUN, Calcium, Chloride, Cholesterol, CO2, Creatinine, GFR, Glucose, HDL, Hematocrit, Hemoglobin, Hemoglobin A1C, LDL, Magnesium, Phosphorus, Platelets, Potassium, PSA, Sodium, Triglycerides, and WBC  Lab Results   Component Value Date    ALT 34 03/18/2021    AST 34 03/18/2021    BUN 18 03/18/2021    CALCIUM 9 2 03/18/2021     03/18/2021    CO2 30 03/18/2021    CREATININE 0 93 03/18/2021    HDL 67 03/18/2021    HCT 45 4 03/18/2021    HGB 14 6 03/18/2021     03/18/2021    K 4 6 03/18/2021    TRIG 83 03/18/2021    WBC 5 78 03/18/2021     Note: for a comprehensive list of the patient's lab results, access the Results Review activity  CARDIAC TESTING:   ECHO:   No results found for this or any previous visit  No results found for this or any previous visit  CATH:  No results found for this or any previous visit  STRESS TEST:  No results found for this or any previous visit

## 2021-08-16 ENCOUNTER — TELEPHONE (OUTPATIENT)
Dept: CARDIOLOGY CLINIC | Facility: CLINIC | Age: 81
End: 2021-08-16

## 2021-08-16 NOTE — TELEPHONE ENCOUNTER
----- Message from Junior Marlow sent at 8/10/2021 10:34 PM EDT -----  Regarding: Blood pressures  Hello,    I am following up as this patient faxed us a log of her blood pressures (I will put this paper back in the green chart to be scanned in)  Please commend her on the amazing graph that she made for them! With these blood pressure readings, I will not change her current blood pressure regimen, those blood pressures look much better than when she came to the office  Please let her know that I hope her and Mr Jia Reyes had a good couple vacations and we will see her at her yearly follow-up otherwise      Thank you,    [facility]

## 2021-08-16 NOTE — TELEPHONE ENCOUNTER
Spoke with patient  Told her to continue same medication regimen per Sandra Del Valle PA-C  She wanted to report these readings:    8/14/21 108/69    51  8/15/21 134/77    47  8/16/21 125/65    50      She feels very well and is walking 2-2 5 miles per day and now has also returned to the gym

## 2021-08-22 DIAGNOSIS — K21.9 GASTROESOPHAGEAL REFLUX DISEASE WITHOUT ESOPHAGITIS: ICD-10-CM

## 2021-08-22 DIAGNOSIS — I10 ESSENTIAL HYPERTENSION: ICD-10-CM

## 2021-08-23 RX ORDER — OMEPRAZOLE 20 MG/1
CAPSULE, DELAYED RELEASE ORAL
Qty: 90 CAPSULE | Refills: 3 | Status: SHIPPED | OUTPATIENT
Start: 2021-08-23

## 2021-08-23 RX ORDER — VALSARTAN 80 MG/1
TABLET ORAL
Qty: 90 TABLET | Refills: 3 | Status: SHIPPED | OUTPATIENT
Start: 2021-08-23

## 2021-09-02 ENCOUNTER — OFFICE VISIT (OUTPATIENT)
Dept: FAMILY MEDICINE CLINIC | Facility: CLINIC | Age: 81
End: 2021-09-02
Payer: MEDICARE

## 2021-09-02 VITALS
BODY MASS INDEX: 34.66 KG/M2 | HEART RATE: 54 BPM | HEIGHT: 64 IN | TEMPERATURE: 98 F | SYSTOLIC BLOOD PRESSURE: 138 MMHG | DIASTOLIC BLOOD PRESSURE: 72 MMHG | WEIGHT: 203 LBS | RESPIRATION RATE: 16 BRPM

## 2021-09-02 DIAGNOSIS — I10 ESSENTIAL HYPERTENSION: Primary | ICD-10-CM

## 2021-09-02 DIAGNOSIS — K44.9 HIATAL HERNIA: ICD-10-CM

## 2021-09-02 DIAGNOSIS — M25.552 LEFT HIP PAIN: ICD-10-CM

## 2021-09-02 DIAGNOSIS — Z00.00 MEDICARE ANNUAL WELLNESS VISIT, SUBSEQUENT: ICD-10-CM

## 2021-09-02 DIAGNOSIS — Z86.718 HISTORY OF DVT OF LOWER EXTREMITY: ICD-10-CM

## 2021-09-02 DIAGNOSIS — R35.0 URINARY FREQUENCY: ICD-10-CM

## 2021-09-02 DIAGNOSIS — K21.9 GASTROESOPHAGEAL REFLUX DISEASE WITHOUT ESOPHAGITIS: ICD-10-CM

## 2021-09-02 DIAGNOSIS — E78.00 PURE HYPERCHOLESTEROLEMIA: ICD-10-CM

## 2021-09-02 PROBLEM — E66.01 OBESITY, MORBID (HCC): Status: RESOLVED | Noted: 2021-03-29 | Resolved: 2021-09-02

## 2021-09-02 PROCEDURE — 1123F ACP DISCUSS/DSCN MKR DOCD: CPT | Performed by: FAMILY MEDICINE

## 2021-09-02 PROCEDURE — 99214 OFFICE O/P EST MOD 30 MIN: CPT | Performed by: FAMILY MEDICINE

## 2021-09-02 PROCEDURE — G0439 PPPS, SUBSEQ VISIT: HCPCS | Performed by: FAMILY MEDICINE

## 2021-09-02 NOTE — PROGRESS NOTES
Assessment and Plan:     Problem List Items Addressed This Visit        Digestive    Gastroesophageal reflux disease       Cardiovascular and Mediastinum    Essential hypertension       Other    Hyperlipidemia    Obesity, morbid ([facility] [address] )      Other Visit Diagnoses     Urinary frequency    -  Primary    Relevant Orders    UA w Reflex to Microscopic w Reflex to Culture    Medicare annual wellness visit, subsequent               Preventive health issues were discussed with patient, and age appropriate screening tests were ordered as noted in patient's After Visit Summary  Personalized health advice and appropriate referrals for health education or preventive services given if needed, as noted in patient's After Visit Summary       History of Present Illness:     Patient presents for Medicare Annual Wellness visit    Patient Care Team:  Norma Peterson MD as PCP - General (Family Medicine)  MARIELOS Tam,      Problem List:     Patient Active Problem List   Diagnosis    History of DVT of lower extremity    Essential hypertension    Fibrocystic breast changes    Gastroesophageal reflux disease    Hearing loss    Hiatal hernia    Hyperlipidemia    Varicose veins of left lower extremity    Bradycardia    Obesity, morbid ([facility] [address] )      Past Medical and Surgical History:     Past Medical History:   Diagnosis Date    Acid reflux     [facility]    Hearing loss     Hypercholesterolemia     Hypertension     Impingement syndrome of left shoulder 7/12/2019    Wears glasses      Past Surgical History:   Procedure Laterality Date    BREAST BIOPSY      1995 left, 1997 unknown side    COLONOSCOPY      TONSILLECTOMY      TUBAL LIGATION      UPPER GASTROINTESTINAL ENDOSCOPY      WISDOM TOOTH EXTRACTION  1962      Family History:     Family History   Problem Relation Age of Onset    Diabetes Mother     Hypertension Mother     Cancer Father     Diabetes Father    Jimy Carrion Hypertension Father     Colon cancer Father 72    Congenital heart disease Daughter     Hyperlipidemia Other         pure    Lymphoma Sister 72    Lung cancer Sister 62    Skin cancer Brother 72        Melanoma    No Known Problems Maternal Grandmother     No Known Problems Maternal Grandfather     No Known Problems Paternal Grandmother     No Known Problems Paternal Grandfather     Prostate cancer Brother 72    No Known Problems Daughter       Social History:     Social History     Socioeconomic History    Marital status: /Civil Union     Spouse name: None    Number of children: None    Years of education: None    Highest education level: None   Occupational History    None   Tobacco Use    Smoking status: Never Smoker    Smokeless tobacco: Never Used   Vaping Use    Vaping Use: Never used   Substance and Sexual Activity    Alcohol use: Yes     Comment: rare    Drug use: No    Sexual activity: Not Currently     Partners: Male     Birth control/protection: Post-menopausal, None   Other Topics Concern    None   Social History Narrative    None     Social Determinants of Health     Financial Resource Strain:     Difficulty of Paying Living Expenses:    Food Insecurity:     Worried About Running Out of Food in the Last Year:     Ran Out of Food in the Last Year:    Transportation Needs:     Lack of Transportation (Medical):      Lack of Transportation (Non-Medical):    Physical Activity:     Days of Exercise per Week:     Minutes of Exercise per Session:    Stress:     Feeling of Stress :    Social Connections:     Frequency of Communication with Friends and Family:     Frequency of Social Gatherings with Friends and Family:     Attends Gnosticist Services:     Active Member of Clubs or Organizations:     Attends Club or Organization Meetings:     Marital Status:    Intimate Partner Violence:     Fear of Current or Ex-Partner:     Emotionally Abused:     Physically Abused:  Sexually Abused:       Medications and Allergies:     Current Outpatient Medications   Medication Sig Dispense Refill    amLODIPine (NORVASC) 5 mg tablet TAKE 1 TABLET BY MOUTH TWO TIMES DAILY 180 tablet 3    Ascorbic Acid (VITAMIN C) 500 MG CAPS Take 1 capsule by mouth daily      Calcium Carbonate-Vitamin D3 (CALCIUM 600/VITAMIN D) 600-400 MG-UNIT TABS Take by mouth      Cholecalciferol (VITAMIN D) 2000 units tablet Take by mouth daily      famotidine (PEPCID) 20 mg tablet Take 20 mg by mouth daily      folic acid (FOLVITE) 820 MCG tablet Take 1 tablet by mouth daily      hydrochlorothiazide (HYDRODIURIL) 25 mg tablet TAKE 1 TABLET BY MOUTH EVERY DAY 90 tablet 3    omeprazole (PriLOSEC) 20 mg delayed release capsule TAKE 1 CAPSULE BY MOUTH EVERY DAY 90 capsule 3    potassium chloride (K-DUR,KLOR-CON) 10 mEq tablet Take 1 tablet (10 mEq total) by mouth daily 90 tablet 3    simvastatin (ZOCOR) 20 mg tablet TAKE 1 TABLET BY MOUTH EVERY DAY 90 tablet 1    valsartan (DIOVAN) 80 mg tablet TAKE 1 TABLET BY MOUTH EVERY DAY 90 tablet 3    ofloxacin (FLOXIN) 0 3 % otic solution 4 gtts to affected ear bid x 7 days (Patient not taking: Reported on 5/17/2021) 5 mL 0     No current facility-administered medications for this visit       Allergies   Allergen Reactions    Sulfa Antibiotics Other (See Comments)     Unknown reaction     Eliquis [Apixaban] Rash    Motrin [Ibuprofen] GI Intolerance     If on prescription strength, over the counter she does fine with      Immunizations:     Immunization History   Administered Date(s) Administered    Hep A, adult 10/05/2019, 10/21/2019    INFLUENZA 09/26/2011, 09/17/2012, 10/21/2013, 10/03/2014, 10/08/2015, 10/18/2016, 09/19/2017, 10/09/2018, 09/26/2019    Influenza, high dose seasonal 0 7 mL 10/28/2020    Pneumococcal Conjugate 13-Valent 12/16/2015, 09/19/2017    Pneumococcal Polysaccharide PPV23 10/17/2007    SARS-CoV-2 / COVID-19 mRNA IM (Pfizer-BioNTech) 02/28/2021, 03/21/2021    Tdap 09/19/2019    Zoster 03/18/2009    Zoster Vaccine Recombinant 09/05/2019, 01/20/2020, 03/12/2020      Health Maintenance:         Topic Date Due    Hepatitis C Screening  Never done    Colorectal Cancer Screening  04/27/2026         Topic Date Due    Influenza Vaccine (1) 09/01/2021      Medicare Health Risk Assessment:     /72   Pulse (!) 54   Temp 98 °F (36 7 °C)   Resp 16   Ht 5' 4" (1 626 m)   Wt 92 1 kg (203 lb)   BMI 34 84 kg/m²      Tom Huerta is here for her Subsequent Wellness visit  Last Medicare Wellness visit information reviewed, patient interviewed and updates made to the record today  Health Risk Assessment:   Patient rates overall health as excellent  Patient feels that their physical health rating is same  Patient is very satisfied with their life  Eyesight was rated as same  Hearing was rated as same  Patient feels that their emotional and mental health rating is same  Patients states they are never, rarely angry  Patient states they are never, rarely unusually tired/fatigued  Pain experienced in the last 7 days has been some  Patient's pain rating has been 3/10  Patient states that she has experienced no weight loss or gain in last 6 months  Depression Screening:   PHQ-2 Score: 0      Fall Risk Screening: In the past year, patient has experienced: no history of falling in past year      Urinary Incontinence Screening:   Patient has not leaked urine accidently in the last six months  Home Safety:  Patient does not have trouble with stairs inside or outside of their home  Patient has working smoke alarms and has working carbon monoxide detector  Home safety hazards include: none  Nutrition:   Current diet is Regular and No Added Salt  Medications:   Patient is currently taking over-the-counter supplements  OTC medications include: see medication list  Patient is able to manage medications       Activities of Daily Living (ADLs)/Instrumental Activities of Daily Living (IADLs):   Walk and transfer into and out of bed and chair?: Yes  Dress and groom yourself?: Yes    Bathe or shower yourself?: Yes    Feed yourself? Yes  Do your laundry/housekeeping?: Yes  Manage your money, pay your bills and track your expenses?: Yes  Make your own meals?: Yes    Do your own shopping?: Yes    Previous Hospitalizations:   Any hospitalizations or ED visits within the last 12 months?: No      Advance Care Planning:   Living will: Yes    Advanced directive: Yes      Cognitive Screening:   Provider or family/friend/caregiver concerned regarding cognition?: No    PREVENTIVE SCREENINGS      Cardiovascular Screening:    General: Screening Not Indicated and History Lipid Disorder      Diabetes Screening:     General: Screening Current      Colorectal Cancer Screening:     General: Screening Current      Breast Cancer Screening:       Due for: Mammogram        Cervical Cancer Screening:    General: Screening Not Indicated      Osteoporosis Screening:    General: Screening Not Indicated      Abdominal Aortic Aneurysm (AAA) Screening:        General: Screening Not Indicated      Lung Cancer Screening:     General: Screening Not Indicated      Hepatitis C Screening:    General: Screening Not Indicated    Screening, Brief Intervention, and Referral to Treatment (SBIRT)    Screening  Typical number of drinks in a day: 0  Typical number of drinks in a week: 0  Interpretation: Low risk drinking behavior  Single Item Drug Screening:  How often have you used an illegal drug (including marijuana) or a prescription medication for non-medical reasons in the past year? never    Single Item Drug Screen Score: 0  Interpretation: Negative screen for possible drug use disorder    Brief Intervention  Alcohol & drug use screenings were reviewed  No concerns regarding substance use disorder identified       Other Counseling Topics:   Skin self-exam, sunscreen and calcium and vitamin D intake and regular weightbearing exercise         Onelia Santana MD

## 2021-09-02 NOTE — PROGRESS NOTES
Assessment/Plan:     Diagnoses and all orders for this visit:    Essential hypertension    Pure hypercholesterolemia    Gastroesophageal reflux disease without esophagitis    Hiatal hernia    Left hip pain    Urinary frequency  -     UA w Reflex to Microscopic w Reflex to Culture    History of DVT of lower extremity    Medicare annual wellness visit, subsequent           Continue with current medications  Repeat labs next month  Flu vaccine in the fall  COVID-19 booster once eligible  Consider x-rays of left hip/lumbar spine for persistent symptoms  Advised to call if any changes     Patient ID: William Cage is a 68 y o  female  Follow up visit  Medications reviewed  Labs 03/2021 see note  Hypertension on Amlodipine 5 mg daily, Valsartan 80 mg daily and HCTZ 25 mg daily  Creatinine 0 93  Electrolytes normal  K+ 4 6  on K+ supplement  Hgb 14  6  04/2019 UA no proteinuria  Hyperlipidemia on Simvastatin 20 mg/day 03/2021 lipid profile cholesterol 196 decreased from 203  Triglycerides 83  HDL 67    LFTs normal  FBS 87         Recent Results (from the past 5040 hour(s))   CBC and differential    Collection Time: 03/18/21 10:43 AM   Result Value Ref Range    WBC 5 78 4 31 - 10 16 Thousand/uL    RBC 5 27 (H) 3 81 - 5 12 Million/uL    Hemoglobin 14 6 11 5 - 15 4 g/dL    Hematocrit 45 4 34 8 - 46 1 %    MCV 86 82 - 98 fL    MCH 27 7 26 8 - 34 3 pg    MCHC 32 2 31 4 - 37 4 g/dL    RDW 13 3 11 6 - 15 1 %    MPV 11 6 8 9 - 12 7 fL    Platelets 638 475 - 498 Thousands/uL    nRBC 0 /100 WBCs    Neutrophils Relative 59 43 - 75 %    Immat GRANS % 0 0 - 2 %    Lymphocytes Relative 29 14 - 44 %    Monocytes Relative 10 4 - 12 %    Eosinophils Relative 1 0 - 6 %    Basophils Relative 1 0 - 1 %    Neutrophils Absolute 3 40 1 85 - 7 62 Thousands/µL    Immature Grans Absolute 0 01 0 00 - 0 20 Thousand/uL    Lymphocytes Absolute 1 66 0 60 - 4 47 Thousands/µL    Monocytes Absolute 0 60 0 17 - 1 22 Thousand/µL Eosinophils Absolute 0 07 0 00 - 0 61 Thousand/µL    Basophils Absolute 0 04 0 00 - 0 10 Thousands/µL   Comprehensive metabolic panel    Collection Time: 03/18/21 10:43 AM   Result Value Ref Range    Sodium 137 136 - 145 mmol/L    Potassium 4 6 3 5 - 5 3 mmol/L    Chloride 104 100 - 108 mmol/L    CO2 30 21 - 32 mmol/L    ANION GAP 3 (L) 4 - 13 mmol/L    BUN 18 5 - 25 mg/dL    Creatinine 0 93 0 60 - 1 30 mg/dL    Glucose, Fasting 87 65 - 99 mg/dL    Calcium 9 2 8 3 - 10 1 mg/dL    AST 34 5 - 45 U/L    ALT 34 12 - 78 U/L    Alkaline Phosphatase 94 46 - 116 U/L    Total Protein 6 8 6 4 - 8 2 g/dL    Albumin 3 9 3 5 - 5 0 g/dL    Total Bilirubin 0 77 0 20 - 1 00 mg/dL    eGFR 59 ml/min/1 73sq m   TSH, 3rd generation with Free T4 reflex    Collection Time: 03/18/21 10:43 AM   Result Value Ref Range    TSH 3RD GENERATON 1 550 0 358 - 3 740 uIU/mL   Lipid panel    Collection Time: 03/18/21 10:43 AM   Result Value Ref Range    Cholesterol 196 50 - 200 mg/dL    Triglycerides 83 <=150 mg/dL    HDL, Direct 67 >=40 mg/dL    LDL Calculated 112 (H) 0 - 100 mg/dL    Non-HDL-Chol (CHOL-HDL) 129 mg/dl         The following portions of the patient's history were reviewed and updated as appropriate: allergies, current medications, past family history, past medical history, past social history, past surgical history and problem list     Review of Systems   Constitutional: Negative for appetite change, chills, fatigue, fever and unexpected weight change  HENT: Positive for hearing loss (bilateral hearing aids  )  Negative for congestion, ear pain, postnasal drip, rhinorrhea, sore throat and trouble swallowing  Prior ENT evaluation for recurrent sore throat LPR   Eyes: Negative for visual disturbance  Respiratory: Negative for cough, shortness of breath and wheezing  Cardiovascular: Negative for chest pain, palpitations and leg swelling  History of DVT left leg treated with Eliquis  No recurrence   No FH venous thrombosis  Gastrointestinal: Negative for abdominal pain, blood in stool, constipation, diarrhea, nausea and vomiting  GERD patient was switched to Famotidine 20 mg daily  She uses infrequent prn  Omeprazole 20 mg  No dysphagia  No history of Mauro's  04/2021 EGD Normal except for hiatal hernia  04/2021 colonoscopy normal except for mild diverticulosis sigmoid colon   Endocrine:        Lab Results       Component                Value               Date                       [id number]             3 940 (H)           06/15/2020            Repeat TSH 03/2021 1 550   Genitourinary: Negative for difficulty urinating  Musculoskeletal: Negative for arthralgias and myalgias  Intermittent pain left lateral hip/lower back/left lower abdomen  One episode of urinary frequency no dysuria or gross hematuria  No history of kidney stones  Colonoscopy 04/2021 Pain does not radiate into left leg no left leg weakness or numbness  No new bowel changes  Patient walks on a regular basis  No trauma or injury  History of recurrent left lumbar radiculopathy currently asymptomatic  Prior PT  She uses prn Advil  Skin: Negative for rash  Neurological: Negative for dizziness and headaches  Hematological: Negative for adenopathy  Does not bruise/bleed easily  Psychiatric/Behavioral: Negative for dysphoric mood and sleep disturbance  Objective:    /72   Pulse (!) 54   Temp 98 °F (36 7 °C)   Resp 16   Ht 5' 4" (1 626 m)   Wt 92 1 kg (203 lb)   BMI 34 84 kg/m²     BP Readings from Last 3 Encounters:   09/02/21 138/72   07/01/21 150/80   05/17/21 130/84     Wt Readings from Last 3 Encounters:   09/02/21 92 1 kg (203 lb)   07/01/21 90 7 kg (200 lb)   06/03/21 93 9 kg (207 lb)            Physical Exam  Vitals and nursing note reviewed  Constitutional:       General: She is not in acute distress  Appearance: She is well-developed     HENT:      Right Ear: External ear normal       Left Ear: External ear normal    Eyes:      General: No scleral icterus  Extraocular Movements: Extraocular movements intact  Conjunctiva/sclera: Conjunctivae normal       Pupils: Pupils are equal, round, and reactive to light  Neck:      Thyroid: No thyroid mass or thyromegaly  Vascular: No carotid bruit or JVD  Trachea: No tracheal deviation  Cardiovascular:      Rate and Rhythm: Normal rate and regular rhythm  Heart sounds: Normal heart sounds  No murmur heard  No gallop  Pulmonary:      Effort: Pulmonary effort is normal  No respiratory distress  Breath sounds: Normal breath sounds  No wheezing or rales  Abdominal:      General: Bowel sounds are normal  There is no distension or abdominal bruit  Palpations: Abdomen is soft  There is no hepatomegaly, splenomegaly or mass  Tenderness: There is no abdominal tenderness  There is no guarding or rebound  Musculoskeletal:         General: No tenderness  Right lower leg: No edema  Left lower leg: No edema  Comments: Full range of motion of left hip  No reproducible pain  No tenderness palpating along left lower lumbar paraspinal area, lumbar spine or left SI area  No lateral hip tenderness  Negative straight leg raise test   Lymphadenopathy:      Cervical: No cervical adenopathy  Upper Body:      Right upper body: No supraclavicular adenopathy  Left upper body: No supraclavicular adenopathy  Skin:     Findings: No rash  Nails: There is no clubbing  Neurological:      General: No focal deficit present  Mental Status: She is alert and oriented to person, place, and time     Psychiatric:         Mood and Affect: Mood normal          Behavior: Behavior normal          Cognition and Memory: Cognition normal

## 2021-09-02 NOTE — PATIENT INSTRUCTIONS
Medicare Preventive Visit Patient Instructions  Thank you for completing your Welcome to Medicare Visit or Medicare Annual Wellness Visit today  Your next wellness visit will be due in one year (9/3/2022)  The screening/preventive services that you may require over the next 5-10 years are detailed below  Some tests may not apply to you based off risk factors and/or age  Screening tests ordered at today's visit but not completed yet may show as past due  Also, please note that scanned in results may not display below  Preventive Screenings:  Service Recommendations Previous Testing/Comments   Colorectal Cancer Screening  * Colonoscopy    * Fecal Occult Blood Test (FOBT)/Fecal Immunochemical Test (FIT)  * Fecal DNA/Cologuard Test  * Flexible Sigmoidoscopy Age: 54-65 years old   Colonoscopy: every 10 years (may be performed more frequently if at higher risk)  OR  FOBT/FIT: every 1 year  OR  Cologuard: every 3 years  OR  Sigmoidoscopy: every 5 years  Screening may be recommended earlier than age 48 if at higher risk for colorectal cancer  Also, an individualized decision between you and your healthcare provider will decide whether screening between the ages of 74-80 would be appropriate  Colonoscopy: 04/27/2021  FOBT/FIT: Not on file  Cologuard: Not on file  Sigmoidoscopy: Not on file    Screening Current     Breast Cancer Screening Age: 36 years old  Frequency: every 1-2 years  Not required if history of left and right mastectomy Mammogram: 10/08/2020    Screening Current   Cervical Cancer Screening Between the ages of 21-29, pap smear recommended once every 3 years  Between the ages of 33-67, can perform pap smear with HPV co-testing every 5 years     Recommendations may differ for women with a history of total hysterectomy, cervical cancer, or abnormal pap smears in past  Pap Smear: 05/17/2021    Screening Not Indicated   Hepatitis C Screening Once for adults born between 1945 and 1965  More frequently in patients at high risk for Hepatitis C Hep C Antibody: Not on file        Diabetes Screening 1-2 times per year if you're at risk for diabetes or have pre-diabetes Fasting glucose: 87 mg/dL   A1C: No results in last 5 years    Screening Current   Cholesterol Screening Once every 5 years if you don't have a lipid disorder  May order more often based on risk factors  Lipid panel: 03/18/2021    Screening Not Indicated  History Lipid Disorder     Other Preventive Screenings Covered by Medicare:  1  Abdominal Aortic Aneurysm (AAA) Screening: covered once if your at risk  You're considered to be at risk if you have a family history of AAA  2  Lung Cancer Screening: covers low dose CT scan once per year if you meet all of the following conditions: (1) Age 50-69; (2) No signs or symptoms of lung cancer; (3) Current smoker or have quit smoking within the last 15 years; (4) You have a tobacco smoking history of at least 30 pack years (packs per day multiplied by number of years you smoked); (5) You get a written order from a healthcare provider  3  Glaucoma Screening: covered annually if you're considered high risk: (1) You have diabetes OR (2) Family history of glaucoma OR (3)  aged 48 and older OR (3)  American aged 72 and older  3  Osteoporosis Screening: covered every 2 years if you meet one of the following conditions: (1) You're estrogen deficient and at risk for osteoporosis based off medical history and other findings; (2) Have a vertebral abnormality; (3) On glucocorticoid therapy for more than 3 months; (4) Have primary hyperparathyroidism; (5) On osteoporosis medications and need to assess response to drug therapy  · Last bone density test (DXA Scan): Not on file  5  HIV Screening: covered annually if you're between the age of 12-76  Also covered annually if you are younger than 13 and older than 72 with risk factors for HIV infection   For pregnant patients, it is covered up to 3 times per pregnancy  Immunizations:  Immunization Recommendations   Influenza Vaccine Annual influenza vaccination during flu season is recommended for all persons aged >= 6 months who do not have contraindications   Pneumococcal Vaccine (Prevnar and Pneumovax)  * Prevnar = PCV13  * Pneumovax = PPSV23   Adults 25-60 years old: 1-3 doses may be recommended based on certain risk factors  Adults 72 years old: Prevnar (PCV13) vaccine recommended followed by Pneumovax (PPSV23) vaccine  If already received PPSV23 since turning 65, then PCV13 recommended at least one year after PPSV23 dose  Hepatitis B Vaccine 3 dose series if at intermediate or high risk (ex: diabetes, end stage renal disease, liver disease)   Tetanus (Td) Vaccine - COST NOT COVERED BY MEDICARE PART B Following completion of primary series, a booster dose should be given every 10 years to maintain immunity against tetanus  Td may also be given as tetanus wound prophylaxis  Tdap Vaccine - COST NOT COVERED BY MEDICARE PART B Recommended at least once for all adults  For pregnant patients, recommended with each pregnancy  Shingles Vaccine (Shingrix) - COST NOT COVERED BY MEDICARE PART B  2 shot series recommended in those aged 48 and above     Health Maintenance Due:      Topic Date Due    Hepatitis C Screening  Never done    Colorectal Cancer Screening  04/27/2026     Immunizations Due:      Topic Date Due    Influenza Vaccine (1) 09/01/2021     Advance Directives   What are advance directives? Advance directives are legal documents that state your wishes and plans for medical care  These plans are made ahead of time in case you lose your ability to make decisions for yourself  Advance directives can apply to any medical decision, such as the treatments you want, and if you want to donate organs  What are the types of advance directives? There are many types of advance directives, and each state has rules about how to use them   You may choose a combination of any of the following:  · Living will: This is a written record of the treatment you want  You can also choose which treatments you do not want, which to limit, and which to stop at a certain time  This includes surgery, medicine, IV fluid, and tube feedings  · Durable power of  for healthcare [facility]): This is a written record that states who you want to make healthcare choices for you when you are unable to make them for yourself  This person, called a proxy, is usually a family member or a friend  You may choose more than 1 proxy  · Do not resuscitate (DNR) order:  A DNR order is used in case your heart stops beating or you stop breathing  It is a request not to have certain forms of treatment, such as CPR  A DNR order may be included in other types of advance directives  · Medical directive: This covers the care that you want if you are in a coma, near death, or unable to make decisions for yourself  You can list the treatments you want for each condition  Treatment may include pain medicine, surgery, blood transfusions, dialysis, IV or tube feedings, and a ventilator (breathing machine)  · Values history: This document has questions about your views, beliefs, and how you feel and think about life  This information can help others choose the care that you would choose  Why are advance directives important? An advance directive helps you control your care  Although spoken wishes may be used, it is better to have your wishes written down  Spoken wishes can be misunderstood, or not followed  Treatments may be given even if you do not want them  An advance directive may make it easier for your family to make difficult choices about your care  Weight Management   Why it is important to manage your weight:  Being overweight increases your risk of health conditions such as heart disease, high blood pressure, type 2 diabetes, and certain types of cancer   It can also increase your risk for osteoarthritis, sleep apnea, and other respiratory problems  Aim for a slow, steady weight loss  Even a small amount of weight loss can lower your risk of health problems  How to lose weight safely:  A safe and healthy way to lose weight is to eat fewer calories and get regular exercise  You can lose up about 1 pound a week by decreasing the number of calories you eat by 500 calories each day  Healthy meal plan for weight management:  A healthy meal plan includes a variety of foods, contains fewer calories, and helps you stay healthy  A healthy meal plan includes the following:  · Eat whole-grain foods more often  A healthy meal plan should contain fiber  Fiber is the part of grains, fruits, and vegetables that is not broken down by your body  Whole-grain foods are healthy and provide extra fiber in your diet  Some examples of whole-grain foods are whole-wheat breads and pastas, oatmeal, brown rice, and bulgur  · Eat a variety of vegetables every day  Include dark, leafy greens such as spinach, kale, logan greens, and mustard greens  Eat yellow and orange vegetables such as carrots, sweet potatoes, and winter squash  · Eat a variety of fruits every day  Choose fresh or canned fruit (canned in its own juice or light syrup) instead of juice  Fruit juice has very little or no fiber  · Eat low-fat dairy foods  Drink fat-free (skim) milk or 1% milk  Eat fat-free yogurt and low-fat cottage cheese  Try low-fat cheeses such as mozzarella and other reduced-fat cheeses  · Choose meat and other protein foods that are low in fat  Choose beans or other legumes such as split peas or lentils  Choose fish, skinless poultry (chicken or turkey), or lean cuts of red meat (beef or pork)  Before you cook meat or poultry, cut off any visible fat  · Use less fat and oil  Try baking foods instead of frying them  Add less fat, such as margarine, sour cream, regular salad dressing and mayonnaise to foods   Eat fewer high-fat foods  Some examples of high-fat foods include french fries, doughnuts, ice cream, and cakes  · Eat fewer sweets  Limit foods and drinks that are high in sugar  This includes candy, cookies, regular soda, and sweetened drinks  Exercise:  Exercise at least 30 minutes per day on most days of the week  Some examples of exercise include walking, biking, dancing, and swimming  You can also fit in more physical activity by taking the stairs instead of the elevator or parking farther away from stores  Ask your healthcare provider about the best exercise plan for you  © Copyright [employer] 2018 Information is for End User's use only and may not be sold, redistributed or otherwise used for commercial purposes   All illustrations and images included in CareNotes® are the copyrighted property of A D A M , Inc  or [address] [place] [address]

## 2021-10-11 ENCOUNTER — TELEPHONE (OUTPATIENT)
Dept: FAMILY MEDICINE CLINIC | Facility: CLINIC | Age: 81
End: 2021-10-11

## 2021-10-13 ENCOUNTER — APPOINTMENT (OUTPATIENT)
Dept: LAB | Age: 81
End: 2021-10-13
Payer: MEDICARE

## 2021-10-13 ENCOUNTER — HOSPITAL ENCOUNTER (OUTPATIENT)
Dept: RADIOLOGY | Age: 81
Discharge: HOME | End: 2021-10-13
Payer: MEDICARE

## 2021-10-13 VITALS — WEIGHT: 200 LBS | BODY MASS INDEX: 34.15 KG/M2 | HEIGHT: 64 IN

## 2021-10-13 DIAGNOSIS — Z12.31 ENCOUNTER FOR SCREENING MAMMOGRAM FOR MALIGNANT NEOPLASM OF BREAST: ICD-10-CM

## 2021-10-13 DIAGNOSIS — Z12.31 SCREENING MAMMOGRAM, ENCOUNTER FOR: ICD-10-CM

## 2021-10-13 DIAGNOSIS — Z01.84 IMMUNITY STATUS TESTING: Primary | ICD-10-CM

## 2021-10-13 LAB
ALBUMIN SERPL BCP-MCNC: 3.7 G/DL (ref 3.5–5)
ALP SERPL-CCNC: 89 U/L (ref 46–116)
ALT SERPL W P-5'-P-CCNC: 27 U/L (ref 12–78)
ANION GAP SERPL CALCULATED.3IONS-SCNC: 2 MMOL/L (ref 4–13)
AST SERPL W P-5'-P-CCNC: 24 U/L (ref 5–45)
BACTERIA UR QL AUTO: ABNORMAL /HPF
BASOPHILS # BLD AUTO: 0.03 THOUSANDS/ΜL (ref 0–0.1)
BASOPHILS NFR BLD AUTO: 1 % (ref 0–1)
BILIRUB SERPL-MCNC: 0.95 MG/DL (ref 0.2–1)
BILIRUB UR QL STRIP: NEGATIVE
BUN SERPL-MCNC: 15 MG/DL (ref 5–25)
CALCIUM SERPL-MCNC: 9.6 MG/DL (ref 8.3–10.1)
CHLORIDE SERPL-SCNC: 105 MMOL/L (ref 100–108)
CHOLEST SERPL-MCNC: 200 MG/DL (ref 50–200)
CLARITY UR: CLEAR
CO2 SERPL-SCNC: 30 MMOL/L (ref 21–32)
COLOR UR: YELLOW
CREAT SERPL-MCNC: 0.89 MG/DL (ref 0.6–1.3)
EOSINOPHIL # BLD AUTO: 0.1 THOUSAND/ΜL (ref 0–0.61)
EOSINOPHIL NFR BLD AUTO: 2 % (ref 0–6)
ERYTHROCYTE [DISTWIDTH] IN BLOOD BY AUTOMATED COUNT: 13.2 % (ref 11.6–15.1)
GFR SERPL CREATININE-BSD FRML MDRD: 62 ML/MIN/1.73SQ M
GLUCOSE P FAST SERPL-MCNC: 100 MG/DL (ref 65–99)
GLUCOSE UR STRIP-MCNC: NEGATIVE MG/DL
HCT VFR BLD AUTO: 44.2 % (ref 34.8–46.1)
HDLC SERPL-MCNC: 72 MG/DL
HGB BLD-MCNC: 14.4 G/DL (ref 11.5–15.4)
HGB UR QL STRIP.AUTO: NEGATIVE
HYALINE CASTS #/AREA URNS LPF: ABNORMAL /LPF
IMM GRANULOCYTES # BLD AUTO: 0.02 THOUSAND/UL (ref 0–0.2)
IMM GRANULOCYTES NFR BLD AUTO: 0 % (ref 0–2)
KETONES UR STRIP-MCNC: NEGATIVE MG/DL
LDLC SERPL CALC-MCNC: 107 MG/DL (ref 0–100)
LEUKOCYTE ESTERASE UR QL STRIP: ABNORMAL
LYMPHOCYTES # BLD AUTO: 1.65 THOUSANDS/ΜL (ref 0.6–4.47)
LYMPHOCYTES NFR BLD AUTO: 29 % (ref 14–44)
MCH RBC QN AUTO: 28.3 PG (ref 26.8–34.3)
MCHC RBC AUTO-ENTMCNC: 32.6 G/DL (ref 31.4–37.4)
MCV RBC AUTO: 87 FL (ref 82–98)
MONOCYTES # BLD AUTO: 0.57 THOUSAND/ΜL (ref 0.17–1.22)
MONOCYTES NFR BLD AUTO: 10 % (ref 4–12)
NEUTROPHILS # BLD AUTO: 3.27 THOUSANDS/ΜL (ref 1.85–7.62)
NEUTS SEG NFR BLD AUTO: 58 % (ref 43–75)
NITRITE UR QL STRIP: NEGATIVE
NON-SQ EPI CELLS URNS QL MICRO: ABNORMAL /HPF
NONHDLC SERPL-MCNC: 128 MG/DL
NRBC BLD AUTO-RTO: 0 /100 WBCS
PH UR STRIP.AUTO: 6 [PH]
PLATELET # BLD AUTO: 200 THOUSANDS/UL (ref 149–390)
PMV BLD AUTO: 12 FL (ref 8.9–12.7)
POTASSIUM SERPL-SCNC: 4 MMOL/L (ref 3.5–5.3)
PROT SERPL-MCNC: 7.2 G/DL (ref 6.4–8.2)
PROT UR STRIP-MCNC: NEGATIVE MG/DL
RBC # BLD AUTO: 5.09 MILLION/UL (ref 3.81–5.12)
RBC #/AREA URNS AUTO: ABNORMAL /HPF
SARS-COV-2 IGG SERPL QL IA: REACTIVE
SARS-COV-2 IGG+IGM SERPL QL IA: REACTIVE
SODIUM SERPL-SCNC: 137 MMOL/L (ref 136–145)
SP GR UR STRIP.AUTO: 1.01 (ref 1–1.03)
TRIGL SERPL-MCNC: 103 MG/DL
UROBILINOGEN UR QL STRIP.AUTO: 0.2 E.U./DL
WBC # BLD AUTO: 5.64 THOUSAND/UL (ref 4.31–10.16)
WBC #/AREA URNS AUTO: ABNORMAL /HPF

## 2021-10-13 PROCEDURE — 77067 SCR MAMMO BI INCL CAD: CPT

## 2021-10-13 PROCEDURE — 81001 URINALYSIS AUTO W/SCOPE: CPT | Performed by: FAMILY MEDICINE

## 2021-10-13 PROCEDURE — 80061 LIPID PANEL: CPT | Performed by: FAMILY MEDICINE

## 2021-10-13 PROCEDURE — 86769 SARS-COV-2 COVID-19 ANTIBODY: CPT

## 2021-10-13 PROCEDURE — 36415 COLL VENOUS BLD VENIPUNCTURE: CPT | Performed by: FAMILY MEDICINE

## 2021-10-13 PROCEDURE — 77063 BREAST TOMOSYNTHESIS BI: CPT

## 2021-10-13 PROCEDURE — 80053 COMPREHEN METABOLIC PANEL: CPT | Performed by: FAMILY MEDICINE

## 2021-10-13 PROCEDURE — 85025 COMPLETE CBC W/AUTO DIFF WBC: CPT | Performed by: FAMILY MEDICINE

## 2021-10-14 ENCOUNTER — TELEPHONE (OUTPATIENT)
Dept: FAMILY MEDICINE CLINIC | Facility: CLINIC | Age: 81
End: 2021-10-14

## 2021-11-03 ENCOUNTER — CLINICAL SUPPORT (OUTPATIENT)
Dept: FAMILY MEDICINE CLINIC | Facility: CLINIC | Age: 81
End: 2021-11-03
Payer: MEDICARE

## 2021-11-03 DIAGNOSIS — Z23 ENCOUNTER FOR IMMUNIZATION: Primary | ICD-10-CM

## 2021-11-03 PROCEDURE — 90662 IIV NO PRSV INCREASED AG IM: CPT

## 2021-11-03 PROCEDURE — G0008 ADMIN INFLUENZA VIRUS VAC: HCPCS

## 2021-11-04 ENCOUNTER — OFFICE VISIT (OUTPATIENT)
Dept: SURGICAL ONCOLOGY | Facility: CLINIC | Age: 81
End: 2021-11-04
Payer: MEDICARE

## 2021-11-04 VITALS
WEIGHT: 200 LBS | OXYGEN SATURATION: 98 % | SYSTOLIC BLOOD PRESSURE: 142 MMHG | BODY MASS INDEX: 34.15 KG/M2 | DIASTOLIC BLOOD PRESSURE: 78 MMHG | RESPIRATION RATE: 16 BRPM | HEART RATE: 74 BPM | HEIGHT: 64 IN | TEMPERATURE: 97.7 F

## 2021-11-04 DIAGNOSIS — N60.19 FIBROCYSTIC BREAST CHANGES, UNSPECIFIED LATERALITY: Primary | ICD-10-CM

## 2021-11-04 DIAGNOSIS — Z12.31 SCREENING MAMMOGRAM, ENCOUNTER FOR: ICD-10-CM

## 2021-11-04 PROCEDURE — 99213 OFFICE O/P EST LOW 20 MIN: CPT | Performed by: SURGERY

## 2021-11-20 ENCOUNTER — OFFICE VISIT (OUTPATIENT)
Dept: URGENT CARE | Facility: CLINIC | Age: 81
End: 2021-11-20
Payer: MEDICARE

## 2021-11-20 ENCOUNTER — NURSE TRIAGE (OUTPATIENT)
Dept: OTHER | Facility: OTHER | Age: 81
End: 2021-11-20

## 2021-11-20 VITALS
HEART RATE: 54 BPM | RESPIRATION RATE: 20 BRPM | WEIGHT: 200 LBS | BODY MASS INDEX: 34.15 KG/M2 | SYSTOLIC BLOOD PRESSURE: 144 MMHG | TEMPERATURE: 97.4 F | HEIGHT: 64 IN | DIASTOLIC BLOOD PRESSURE: 84 MMHG

## 2021-11-20 DIAGNOSIS — M54.32 SCIATICA OF LEFT SIDE: Primary | ICD-10-CM

## 2021-11-20 PROCEDURE — G0463 HOSPITAL OUTPT CLINIC VISIT: HCPCS | Performed by: NURSE PRACTITIONER

## 2021-11-20 PROCEDURE — 99213 OFFICE O/P EST LOW 20 MIN: CPT | Performed by: NURSE PRACTITIONER

## 2021-11-20 RX ORDER — METHOCARBAMOL 750 MG/1
TABLET, FILM COATED ORAL
Qty: 10 TABLET | Refills: 0 | Status: SHIPPED | OUTPATIENT
Start: 2021-11-20 | End: 2022-08-10

## 2021-11-20 RX ORDER — PREDNISONE 10 MG/1
TABLET ORAL
Qty: 16 TABLET | Refills: 0 | Status: SHIPPED | OUTPATIENT
Start: 2021-11-20 | End: 2022-08-10

## 2021-11-21 DIAGNOSIS — E78.00 PURE HYPERCHOLESTEROLEMIA: ICD-10-CM

## 2021-11-21 RX ORDER — SIMVASTATIN 20 MG
TABLET ORAL
Qty: 90 TABLET | Refills: 1 | Status: SHIPPED | OUTPATIENT
Start: 2021-11-21 | End: 2022-05-19

## 2021-11-22 ENCOUNTER — TELEPHONE (OUTPATIENT)
Dept: PHYSICAL THERAPY | Facility: OTHER | Age: 81
End: 2021-11-22

## 2021-11-23 ENCOUNTER — OFFICE VISIT (OUTPATIENT)
Dept: FAMILY MEDICINE CLINIC | Facility: CLINIC | Age: 81
End: 2021-11-23
Payer: MEDICARE

## 2021-11-23 VITALS
SYSTOLIC BLOOD PRESSURE: 140 MMHG | TEMPERATURE: 97.2 F | OXYGEN SATURATION: 99 % | BODY MASS INDEX: 34.83 KG/M2 | WEIGHT: 204 LBS | DIASTOLIC BLOOD PRESSURE: 80 MMHG | HEART RATE: 56 BPM | HEIGHT: 64 IN

## 2021-11-23 DIAGNOSIS — M54.42 ACUTE LEFT-SIDED LOW BACK PAIN WITH LEFT-SIDED SCIATICA: Primary | ICD-10-CM

## 2021-11-23 PROCEDURE — 99213 OFFICE O/P EST LOW 20 MIN: CPT | Performed by: PHYSICIAN ASSISTANT

## 2021-11-29 ENCOUNTER — EVALUATION (OUTPATIENT)
Dept: PHYSICAL THERAPY | Facility: CLINIC | Age: 81
End: 2021-11-29
Payer: MEDICARE

## 2021-11-29 VITALS — HEART RATE: 57 BPM | SYSTOLIC BLOOD PRESSURE: 142 MMHG | DIASTOLIC BLOOD PRESSURE: 82 MMHG | TEMPERATURE: 97.6 F

## 2021-11-29 DIAGNOSIS — M54.42 ACUTE BILATERAL LOW BACK PAIN WITH LEFT-SIDED SCIATICA: Primary | ICD-10-CM

## 2021-11-29 PROCEDURE — 97162 PT EVAL MOD COMPLEX 30 MIN: CPT | Performed by: PHYSICAL THERAPIST

## 2021-11-30 ENCOUNTER — TELEPHONE (OUTPATIENT)
Dept: PHYSICAL THERAPY | Facility: OTHER | Age: 81
End: 2021-11-30

## 2021-12-01 ENCOUNTER — OFFICE VISIT (OUTPATIENT)
Dept: PHYSICAL THERAPY | Facility: CLINIC | Age: 81
End: 2021-12-01
Payer: MEDICARE

## 2021-12-01 DIAGNOSIS — M54.42 ACUTE BILATERAL LOW BACK PAIN WITH LEFT-SIDED SCIATICA: Primary | ICD-10-CM

## 2021-12-01 PROCEDURE — 97110 THERAPEUTIC EXERCISES: CPT | Performed by: PHYSICAL THERAPIST

## 2021-12-01 PROCEDURE — 97140 MANUAL THERAPY 1/> REGIONS: CPT | Performed by: PHYSICAL THERAPIST

## 2021-12-01 PROCEDURE — 97112 NEUROMUSCULAR REEDUCATION: CPT | Performed by: PHYSICAL THERAPIST

## 2021-12-06 ENCOUNTER — OFFICE VISIT (OUTPATIENT)
Dept: PHYSICAL THERAPY | Facility: CLINIC | Age: 81
End: 2021-12-06
Payer: MEDICARE

## 2021-12-06 DIAGNOSIS — M54.42 ACUTE BILATERAL LOW BACK PAIN WITH LEFT-SIDED SCIATICA: Primary | ICD-10-CM

## 2021-12-06 PROCEDURE — 97112 NEUROMUSCULAR REEDUCATION: CPT | Performed by: PHYSICAL THERAPIST

## 2021-12-06 PROCEDURE — 97110 THERAPEUTIC EXERCISES: CPT | Performed by: PHYSICAL THERAPIST

## 2021-12-08 ENCOUNTER — OFFICE VISIT (OUTPATIENT)
Dept: PHYSICAL THERAPY | Facility: CLINIC | Age: 81
End: 2021-12-08
Payer: MEDICARE

## 2021-12-08 DIAGNOSIS — M54.42 ACUTE BILATERAL LOW BACK PAIN WITH LEFT-SIDED SCIATICA: Primary | ICD-10-CM

## 2021-12-08 PROCEDURE — 97112 NEUROMUSCULAR REEDUCATION: CPT | Performed by: PHYSICAL THERAPIST

## 2021-12-08 PROCEDURE — 97110 THERAPEUTIC EXERCISES: CPT | Performed by: PHYSICAL THERAPIST

## 2021-12-09 ENCOUNTER — TELEPHONE (OUTPATIENT)
Dept: FAMILY MEDICINE CLINIC | Facility: CLINIC | Age: 81
End: 2021-12-09

## 2021-12-10 DIAGNOSIS — B34.9 VIRAL INFECTION, UNSPECIFIED: Primary | ICD-10-CM

## 2021-12-11 PROCEDURE — U0003 INFECTIOUS AGENT DETECTION BY NUCLEIC ACID (DNA OR RNA); SEVERE ACUTE RESPIRATORY SYNDROME CORONAVIRUS 2 (SARS-COV-2) (CORONAVIRUS DISEASE [COVID-19]), AMPLIFIED PROBE TECHNIQUE, MAKING USE OF HIGH THROUGHPUT TECHNOLOGIES AS DESCRIBED BY CMS-2020-01-R: HCPCS | Performed by: FAMILY MEDICINE

## 2021-12-11 PROCEDURE — U0005 INFEC AGEN DETEC AMPLI PROBE: HCPCS | Performed by: FAMILY MEDICINE

## 2021-12-13 ENCOUNTER — TELEPHONE (OUTPATIENT)
Dept: FAMILY MEDICINE CLINIC | Facility: CLINIC | Age: 81
End: 2021-12-13

## 2021-12-13 ENCOUNTER — APPOINTMENT (OUTPATIENT)
Dept: PHYSICAL THERAPY | Facility: CLINIC | Age: 81
End: 2021-12-13
Payer: MEDICARE

## 2021-12-15 ENCOUNTER — APPOINTMENT (OUTPATIENT)
Dept: PHYSICAL THERAPY | Facility: CLINIC | Age: 81
End: 2021-12-15
Payer: MEDICARE

## 2021-12-17 ENCOUNTER — TELEPHONE (OUTPATIENT)
Dept: CARDIOLOGY CLINIC | Facility: CLINIC | Age: 81
End: 2021-12-17

## 2021-12-20 ENCOUNTER — APPOINTMENT (OUTPATIENT)
Dept: RADIOLOGY | Facility: CLINIC | Age: 81
End: 2021-12-20
Payer: MEDICARE

## 2021-12-20 ENCOUNTER — OFFICE VISIT (OUTPATIENT)
Dept: PHYSICAL THERAPY | Facility: CLINIC | Age: 81
End: 2021-12-20
Payer: MEDICARE

## 2021-12-20 DIAGNOSIS — M54.42 ACUTE BILATERAL LOW BACK PAIN WITH LEFT-SIDED SCIATICA: Primary | ICD-10-CM

## 2021-12-20 PROCEDURE — 97110 THERAPEUTIC EXERCISES: CPT | Performed by: PHYSICAL THERAPIST

## 2021-12-20 PROCEDURE — 73562 X-RAY EXAM OF KNEE 3: CPT

## 2021-12-20 PROCEDURE — 97112 NEUROMUSCULAR REEDUCATION: CPT | Performed by: PHYSICAL THERAPIST

## 2021-12-21 ENCOUNTER — OFFICE VISIT (OUTPATIENT)
Dept: PHYSICAL THERAPY | Facility: CLINIC | Age: 81
End: 2021-12-21
Payer: MEDICARE

## 2021-12-21 DIAGNOSIS — M54.42 ACUTE BILATERAL LOW BACK PAIN WITH LEFT-SIDED SCIATICA: Primary | ICD-10-CM

## 2021-12-21 PROCEDURE — 97110 THERAPEUTIC EXERCISES: CPT | Performed by: PHYSICAL THERAPIST

## 2021-12-21 PROCEDURE — 97112 NEUROMUSCULAR REEDUCATION: CPT | Performed by: PHYSICAL THERAPIST

## 2021-12-27 ENCOUNTER — APPOINTMENT (OUTPATIENT)
Dept: PHYSICAL THERAPY | Facility: CLINIC | Age: 81
End: 2021-12-27
Payer: MEDICARE

## 2021-12-29 ENCOUNTER — APPOINTMENT (OUTPATIENT)
Dept: PHYSICAL THERAPY | Facility: CLINIC | Age: 81
End: 2021-12-29
Payer: MEDICARE

## 2022-01-03 ENCOUNTER — OFFICE VISIT (OUTPATIENT)
Dept: FAMILY MEDICINE CLINIC | Facility: CLINIC | Age: 82
End: 2022-01-03
Payer: MEDICARE

## 2022-01-03 VITALS
WEIGHT: 202 LBS | HEART RATE: 78 BPM | BODY MASS INDEX: 34.49 KG/M2 | SYSTOLIC BLOOD PRESSURE: 130 MMHG | HEIGHT: 64 IN | DIASTOLIC BLOOD PRESSURE: 90 MMHG | OXYGEN SATURATION: 96 %

## 2022-01-03 DIAGNOSIS — I83.892 VARICOSE VEINS OF LEFT LEG WITH EDEMA: Primary | ICD-10-CM

## 2022-01-03 PROCEDURE — 99213 OFFICE O/P EST LOW 20 MIN: CPT | Performed by: PHYSICIAN ASSISTANT

## 2022-01-03 NOTE — PROGRESS NOTES
Assessment/Plan:     Diagnoses and all orders for this visit:    Varicose veins of left leg with edema  Denies any SOB, coughing or respiratory changes  Warmth and tenderness for 4 weeks with no improvement  History of DVT in 2018, treated on Eliquis for 4 months  - Ordered Duplex US on LLE  Discussed treatment options and further testing if DVT has reoccurred  -     VAS lower limb venous duplex study, unilateral/limited; Future          Subjective:    Patient ID: Stephanie Cortez is a 80 y o  female  Pt is presenting today for Warm, tenderness of left upper leg  Hospitalized for left DVT in Jan 2018 in [place]  She was on Eliquis for 4 months  She developed a rash 3 5 months after developing  The following portions of the patient's history were reviewed and updated as appropriate: allergies, current medications and problem list     Review of Systems   Constitutional: Negative for fatigue, fever and unexpected weight change  HENT: Negative for rhinorrhea and sore throat  Respiratory: Negative for cough, shortness of breath and wheezing  Cardiovascular: Negative for chest pain, palpitations and leg swelling  Gastrointestinal: Negative for constipation, diarrhea and nausea  Musculoskeletal: Negative for arthralgias and myalgias  Skin: Positive for color change  Objective:  /90 (BP Location: Left arm, Patient Position: Sitting, Cuff Size: Standard)   Pulse 78   Ht 5' 4" (1 626 m)   Wt 91 6 kg (202 lb)   SpO2 96%   BMI 34 67 kg/m²      Physical Exam  Vitals reviewed  Constitutional:       General: She is not in acute distress  Appearance: She is well-developed  She is not diaphoretic  HENT:      Head: Normocephalic and atraumatic  Eyes:      Pupils: Pupils are equal, round, and reactive to light  Cardiovascular:      Rate and Rhythm: Normal rate and regular rhythm  Heart sounds: Normal heart sounds  No murmur heard  No friction rub  No gallop      Pulmonary: Effort: Pulmonary effort is normal  No respiratory distress  Breath sounds: Normal breath sounds  No wheezing or rales  Musculoskeletal:      Cervical back: Normal range of motion and neck supple  Left lower leg: Edema (left upper thigh tenderness and warmth  no erythema) present  Skin:     General: Skin is warm  Neurological:      Mental Status: She is alert and oriented to person, place, and time  Mental status is at baseline  Psychiatric:         Behavior: Behavior normal          Thought Content:  Thought content normal

## 2022-01-04 ENCOUNTER — HOSPITAL ENCOUNTER (OUTPATIENT)
Dept: NON INVASIVE DIAGNOSTICS | Facility: CLINIC | Age: 82
Discharge: HOME | End: 2022-01-04
Payer: MEDICARE

## 2022-01-04 ENCOUNTER — CLINICAL SUPPORT (OUTPATIENT)
Dept: CARDIOLOGY CLINIC | Facility: CLINIC | Age: 82
End: 2022-01-04
Payer: MEDICARE

## 2022-01-04 VITALS — SYSTOLIC BLOOD PRESSURE: 184 MMHG | DIASTOLIC BLOOD PRESSURE: 92 MMHG | HEART RATE: 55 BPM

## 2022-01-04 DIAGNOSIS — I83.892 VARICOSE VEINS OF LEFT LEG WITH EDEMA: ICD-10-CM

## 2022-01-04 DIAGNOSIS — R00.1 BRADYCARDIA: Primary | ICD-10-CM

## 2022-01-04 PROCEDURE — 93000 ELECTROCARDIOGRAM COMPLETE: CPT

## 2022-01-04 PROCEDURE — 93971 EXTREMITY STUDY: CPT

## 2022-01-04 PROCEDURE — 93242 EXT ECG>48HR<7D RECORDING: CPT | Performed by: INTERNAL MEDICINE

## 2022-01-04 PROCEDURE — 93971 EXTREMITY STUDY: CPT | Performed by: SURGERY

## 2022-01-04 NOTE — PROGRESS NOTES
Melisa Carranza is here today under the direction of Cas  Pt stated that since she had COVID back in the beginning of December she has been getting fluctuating HR's  She has no symptoms but knows it is abnormal because her pulse ox  EKG reviewed by Dr Tanja Fernandez, sinus whit  Pt is experiencing an increase in BP due to daughter being admitted to the ICU this morning  She is going to call our office on Friday with her BP reading for the week as per Dr Tanja Velasco suggested a one week monitor as the pt's EKG is normal

## 2022-01-19 ENCOUNTER — CLINICAL SUPPORT (OUTPATIENT)
Dept: CARDIOLOGY CLINIC | Facility: CLINIC | Age: 82
End: 2022-01-19
Payer: MEDICARE

## 2022-01-19 DIAGNOSIS — R00.1 BRADYCARDIA: ICD-10-CM

## 2022-01-19 PROCEDURE — 93244 EXT ECG>48HR<7D REV&INTERPJ: CPT | Performed by: INTERNAL MEDICINE

## 2022-02-15 ENCOUNTER — TELEPHONE (OUTPATIENT)
Dept: CARDIOLOGY CLINIC | Facility: CLINIC | Age: 82
End: 2022-02-15

## 2022-02-15 NOTE — TELEPHONE ENCOUNTER
Andres Steiner,   I had to leave a message on voicemail  I relayed all the information  I asked her to call us back if she has any questions or if she would like to schedule OV

## 2022-02-15 NOTE — TELEPHONE ENCOUNTER
----- Message from Andree Pulido, [place] sent at 2/1/2022  5:19 PM EST -----  Mendel Berke,    I tried to reach Mrs Jessica Bernard again on her cell but she didn't answer  Just wanted to see how she was feeling when she was wearing the patch  There were no sustained arrhythmias on her monitor and so long as she is feeling okay then we can continue with the follow up in place already (on a yearly basis)  If she was still concerned about her higher than usual heart rate or symptoms of bradycardia she can come into the office after vacation as well  If you could relay that to her since I keep missing her  Thanks,    Obdulia     ----- Message -----  From: Josue Bradley  Sent: 1/28/2022   9:31 AM EST  To: MARIELOS Yin Audie Bali called our office  She said that she received a message from you about her monitor  She's away on vacation and is checking her home messages  If you need to reach her, her # is   I can also certainly call her with any results  Let me know if I can help in anyway!     Tosin Love

## 2022-05-10 DIAGNOSIS — E78.00 PURE HYPERCHOLESTEROLEMIA: ICD-10-CM

## 2022-05-10 DIAGNOSIS — I10 HYPERTENSION, UNSPECIFIED TYPE: ICD-10-CM

## 2022-05-10 DIAGNOSIS — I10 ESSENTIAL HYPERTENSION: ICD-10-CM

## 2022-05-11 RX ORDER — HYDROCHLOROTHIAZIDE 25 MG/1
TABLET ORAL
Qty: 90 TABLET | Refills: 3 | Status: SHIPPED | OUTPATIENT
Start: 2022-05-11

## 2022-05-11 RX ORDER — AMLODIPINE BESYLATE 5 MG/1
TABLET ORAL
Qty: 180 TABLET | Refills: 3 | Status: SHIPPED | OUTPATIENT
Start: 2022-05-11

## 2022-05-12 DIAGNOSIS — K21.9 GASTROESOPHAGEAL REFLUX DISEASE WITHOUT ESOPHAGITIS: Primary | ICD-10-CM

## 2022-05-12 DIAGNOSIS — I10 ESSENTIAL HYPERTENSION: ICD-10-CM

## 2022-05-12 RX ORDER — FAMOTIDINE 20 MG/1
20 TABLET, FILM COATED ORAL DAILY
Qty: 90 TABLET | Refills: 1 | Status: SHIPPED | OUTPATIENT
Start: 2022-05-12

## 2022-05-12 RX ORDER — POTASSIUM CHLORIDE 750 MG/1
10 TABLET, EXTENDED RELEASE ORAL DAILY
Qty: 90 TABLET | Refills: 3 | Status: SHIPPED | OUTPATIENT
Start: 2022-05-12 | End: 2022-05-12 | Stop reason: SDUPTHER

## 2022-05-12 RX ORDER — POTASSIUM CHLORIDE 750 MG/1
10 TABLET, EXTENDED RELEASE ORAL DAILY
Qty: 90 TABLET | Refills: 3 | Status: SHIPPED | OUTPATIENT
Start: 2022-05-12

## 2022-05-18 ENCOUNTER — ANNUAL EXAM (OUTPATIENT)
Dept: OBGYN CLINIC | Facility: CLINIC | Age: 82
End: 2022-05-18
Payer: MEDICARE

## 2022-05-18 VITALS
WEIGHT: 209 LBS | SYSTOLIC BLOOD PRESSURE: 140 MMHG | HEIGHT: 64 IN | DIASTOLIC BLOOD PRESSURE: 84 MMHG | BODY MASS INDEX: 35.68 KG/M2

## 2022-05-18 DIAGNOSIS — Z01.419 ENCOUNTER FOR ANNUAL ROUTINE GYNECOLOGICAL EXAMINATION: Primary | ICD-10-CM

## 2022-05-18 DIAGNOSIS — Z12.31 ENCOUNTER FOR SCREENING MAMMOGRAM FOR BREAST CANCER: ICD-10-CM

## 2022-05-18 DIAGNOSIS — R10.2 PELVIC PAIN: ICD-10-CM

## 2022-05-18 PROCEDURE — G0101 CA SCREEN;PELVIC/BREAST EXAM: HCPCS | Performed by: OBSTETRICS & GYNECOLOGY

## 2022-05-18 NOTE — PROGRESS NOTES
Assessment/Plan:  Pap smear deferred due to low risk status  Encouraged self-breast examination as well as calcium supplementation  Continue annual mammogram   She will continue to follow-up with her breast specialist   Reviewed colon cancer screening, up-to-date with colonoscopy  Recommend pelvic ultrasound, lower pelvic discomfort  I will notify her these results via telephone  Return to office in 1 year  She will continue to follow-up with her family physician as scheduled  No problem-specific Assessment & Plan notes found for this encounter  Diagnoses and all orders for this visit:    Encounter for annual routine gynecological examination    Encounter for screening mammogram for breast cancer    Pelvic pain  -     US pelvis complete w transvaginal; Future          Subjective:      Patient ID: Adrianne Modi is a 80 y o  female  HPI     This is a very pleasant 30-year-old female  ( x3) presents for gyn exam   She went through menopause at age 48  She was on hormone replacement therapy for approximately 2 years  She denies any vaginal bleeding or spotting  No changes in bowel or bladder function  She has noticed some midline suprapubic pelvic/discomfort over the last 1-2 months  She has been in a monogamous relationship with her  for over 46 years  Pap smears have been normal   She does follow up with her breast specialist once a year, history of fibrocystic breast   Last colonoscopy 2021 with follow-up in 5 years  Patient does enjoy traveling with her family  The following portions of the patient's history were reviewed and updated as appropriate: allergies, current medications, past family history, past medical history, past social history, past surgical history and problem list     Review of Systems   Constitutional: Negative for fatigue, fever and unexpected weight change  Respiratory: Negative for cough, chest tightness, shortness of breath and wheezing  Cardiovascular: Negative  Negative for chest pain and palpitations  Gastrointestinal: Negative  Negative for abdominal distention, abdominal pain, blood in stool, constipation, diarrhea, nausea and vomiting  Genitourinary: Negative  Negative for difficulty urinating, dyspareunia, dysuria, flank pain, frequency, genital sores, hematuria, pelvic pain, urgency, vaginal bleeding, vaginal discharge and vaginal pain  Skin: Negative for rash  Objective:      /84   Ht 5' 4" (1 626 m)   Wt 94 8 kg (209 lb)   BMI 35 87 kg/m²          Physical Exam  Constitutional:       Appearance: Normal appearance  She is well-developed  Cardiovascular:      Rate and Rhythm: Normal rate and regular rhythm  Pulmonary:      Effort: Pulmonary effort is normal       Breath sounds: Normal breath sounds  Chest:   Breasts:      Right: No inverted nipple, mass, nipple discharge, skin change or tenderness  Left: No inverted nipple, mass, nipple discharge, skin change or tenderness  Abdominal:      General: Bowel sounds are normal  There is no distension  Palpations: Abdomen is soft  Tenderness: There is no abdominal tenderness  There is no guarding or rebound  Genitourinary:     Labia:         Right: No rash, tenderness or lesion  Left: No rash, tenderness or lesion  Vagina: Normal  No signs of injury  No vaginal discharge or tenderness  Cervix: No cervical motion tenderness, discharge, friability, lesion, erythema or cervical bleeding  Uterus: Not enlarged and not tender  Adnexa:         Right: No mass, tenderness or fullness  Left: No mass, tenderness or fullness  Comments: External genitalia is within normal limits  The vagina is evident of estrogen deficiency  Cervix is small the os is slightly stenotic  No pelvic floor prolapse  Rectovaginal exam is confirmatory    Neurological:      Mental Status: She is alert and oriented to person, place, and time     Psychiatric:         Behavior: Behavior normal

## 2022-05-19 DIAGNOSIS — E78.00 PURE HYPERCHOLESTEROLEMIA: ICD-10-CM

## 2022-05-19 RX ORDER — SIMVASTATIN 20 MG
TABLET ORAL
Qty: 90 TABLET | Refills: 1 | Status: SHIPPED | OUTPATIENT
Start: 2022-05-19

## 2022-05-20 ENCOUNTER — HOSPITAL ENCOUNTER (OUTPATIENT)
Dept: RADIOLOGY | Age: 82
Discharge: HOME | End: 2022-05-20
Payer: MEDICARE

## 2022-05-20 DIAGNOSIS — R10.2 PELVIC PAIN: ICD-10-CM

## 2022-05-20 PROCEDURE — 76856 US EXAM PELVIC COMPLETE: CPT

## 2022-05-20 PROCEDURE — 76830 TRANSVAGINAL US NON-OB: CPT

## 2022-06-07 ENCOUNTER — TELEPHONE (OUTPATIENT)
Dept: OBGYN CLINIC | Facility: CLINIC | Age: 82
End: 2022-06-07

## 2022-06-07 NOTE — TELEPHONE ENCOUNTER
Pt calling for pelvic U/S results (viewing results in [employer])  [>50% of the face to face encounter time was spent on counseling and/or coordination of care for ___] : Greater than 50% of the face to face encounter time was spent on counseling and/or coordination of care for [unfilled]

## 2022-06-08 ENCOUNTER — TELEPHONE (OUTPATIENT)
Dept: OBGYN CLINIC | Facility: CLINIC | Age: 82
End: 2022-06-08

## 2022-06-28 ENCOUNTER — TELEPHONE (OUTPATIENT)
Dept: FAMILY MEDICINE CLINIC | Facility: CLINIC | Age: 82
End: 2022-06-28

## 2022-06-28 NOTE — TELEPHONE ENCOUNTER
Pt called asking to schedule a PCR covid test before her cruise in July  It would need to be done on 7/13  Can we schedule this for her here?

## 2022-08-10 ENCOUNTER — OFFICE VISIT (OUTPATIENT)
Dept: FAMILY MEDICINE CLINIC | Facility: CLINIC | Age: 82
End: 2022-08-10
Payer: MEDICARE

## 2022-08-10 VITALS
DIASTOLIC BLOOD PRESSURE: 74 MMHG | OXYGEN SATURATION: 96 % | BODY MASS INDEX: 33.97 KG/M2 | WEIGHT: 199 LBS | TEMPERATURE: 97.7 F | HEART RATE: 58 BPM | HEIGHT: 64 IN | SYSTOLIC BLOOD PRESSURE: 134 MMHG

## 2022-08-10 DIAGNOSIS — M54.42 ACUTE LEFT-SIDED LOW BACK PAIN WITH LEFT-SIDED SCIATICA: Primary | ICD-10-CM

## 2022-08-10 PROCEDURE — 99213 OFFICE O/P EST LOW 20 MIN: CPT | Performed by: FAMILY MEDICINE

## 2022-08-10 RX ORDER — METHOCARBAMOL 500 MG/1
500 TABLET, FILM COATED ORAL
Qty: 30 TABLET | Refills: 0 | Status: SHIPPED | OUTPATIENT
Start: 2022-08-10

## 2022-08-10 RX ORDER — METHYLPREDNISOLONE 4 MG/1
TABLET ORAL
Qty: 21 EACH | Refills: 0 | Status: SHIPPED | OUTPATIENT
Start: 2022-08-10 | End: 2022-08-24 | Stop reason: ALTCHOICE

## 2022-08-10 NOTE — PROGRESS NOTES
Assessment/Plan:    Acute left-sided low back pain with left-sided sciatica  Referral to [facility]   Medrol Dosepak   Robaxin as needed  Ice low back 20 minutes 4-5 times daily  Exercises provided       Diagnoses and all orders for this visit:    Acute left-sided low back pain with left-sided sciatica  -     methylPREDNISolone 4 MG tablet therapy pack; Use as directed on package  -     methocarbamol (ROBAXIN) 500 mg tablet; Take 1 tablet (500 mg total) by mouth daily at bedtime as needed for muscle spasms  -     Ambulatory Referral to [facility]; Future          Subjective:      Patient ID: Michael Barton is a 80 y o  female  Low back pain radiating into left shin  Worse at the end of the day  Similar problem in the past   Has seen physical therapy  Concern back on back to physical therapy due to the amount of visits necessary  Has not tried any over-the-counter medications at this time  Denies any difficulty with bowel or bladder  The following portions of the patient's history were reviewed and updated as appropriate: allergies, current medications, past family history, past medical history, past social history, past surgical history and problem list     Review of Systems   Constitutional: Negative for fatigue, fever and unexpected weight change  Musculoskeletal: Positive for back pain and gait problem  Down leg into shin    Neurological: Negative for dizziness, weakness, light-headedness and numbness  Objective:      /74 (BP Location: Left arm, Patient Position: Sitting, Cuff Size: Standard)   Pulse 58   Temp 97 7 °F (36 5 °C)   Ht 5' 4" (1 626 m)   Wt 90 3 kg (199 lb)   SpO2 96%   BMI 34 16 kg/m²          Physical Exam  Vitals and nursing note reviewed  Constitutional:       Appearance: Normal appearance  Musculoskeletal:      Lumbar back: No spasms, tenderness or bony tenderness  Normal range of motion   Positive left straight leg raise test  Negative right straight leg raise test    Neurological:      Mental Status: She is alert  Sensory: No sensory deficit  Motor: No weakness        Gait: Gait normal    Psychiatric:         Mood and Affect: Mood normal          Behavior: Behavior normal

## 2022-08-10 NOTE — PATIENT INSTRUCTIONS
Apply gel ice pack to low back for 20 minutes 4-5 times daily start steroid pack and muscle relaxers at night  Lower Back Exercises   WHAT YOU NEED TO KNOW:   Lower back exercises help heal and strengthen your back muscles to prevent another injury  Ask your healthcare provider if you need to see a physical therapist for more advanced exercises  DISCHARGE INSTRUCTIONS:   Return to the emergency department if:   You have severe pain that prevents you from moving  Contact your healthcare provider if:   Your pain becomes worse  You have new pain  You have questions or concerns about your condition or care  Do lower back exercises safely:   Do the exercises on a mat or firm surface  (not on a bed) to support your spine and prevent low back pain  Move slowly and smoothly  Avoid fast or jerky motions  Breathe normally  Do not hold your breath  Stop if you feel pain  It is normal to feel some discomfort at first  Regular exercise will help decrease your discomfort over time  Lower back exercises: Your healthcare provider may recommend that you do back exercises 10 to 30 minutes each day  He may also recommend that you do exercises 1 to 3 times each day  Ask your healthcare provider which exercises are best for you and how often to do them  Ankle pumps:  Lie on your back  Move your foot up (with your toes pointing toward your head)  Then, move your foot down (with your toes pointing away from you)  Repeat this exercise 10 times on each side  Heel slides:  Lie on your back  Slowly bend one leg and then straighten it  Next, bend the other leg and then straighten it  Repeat 10 times on each side  Pelvic tilt:  Lie on your back with your knees bent and feet flat on the floor  Place your arms in a relaxed position beside your body  Tighten the muscles of your abdomen and flatten your back against the floor  Hold for 5 seconds  Repeat 5 times           Back stretch:  Lie on your back with your hands behind your head  Bend your knees and turn the lower half of your body to one side  Hold this position for 10 seconds  Repeat 3 times on each side  Straight leg raises:  Lie on your back with one leg straight  Bend the other knee  Tighten your abdomen and then slowly lift the straight leg up about 6 to 12 inches off the floor  Hold for 1 to 5 seconds  Lower your leg slowly  Repeat 10 times on each leg  Knee-to-chest:  Lie on your back with your knees bent and feet flat on the floor  Pull one of your knees toward your chest and hold it there for 5 seconds  Return your leg to the starting position  Lift the other knee toward your chest and hold for 5 seconds  Do this 5 times on each side  Cat and camel:  Place your hands and knees on the floor  Arch your back upward toward the ceiling and lower your head  Round out your spine as much as you can  Hold for 5 seconds  Lift your head upward and push your chest downward toward the floor  Hold for 5 seconds  Do 3 sets or as directed  Wall squats:  Stand with your back against a wall  Tighten the muscles of your abdomen  Slowly lower your body until your knees are bent at a 45 degree angle  Hold this position for 5 seconds  Slowly move back up to a standing position  Repeat 10 times  Curl up:  Lie on your back with your knees bent and feet flat on the floor  Place your hands, palms down, underneath the curve in your lower back  Next, with your elbows on the floor, lift your shoulders and chest 2 to 3 inches  Keep your head in line with your shoulders  Hold this position for 5 seconds  When you can do this exercise without pain for 10 to 15 seconds, you may add a rotation  While your shoulders and chest are lifted off the ground, turn slightly to the left and hold  Repeat on the other side  Bird dog:  Place your hands and knees on the floor   Keep your wrists directly below your shoulders and your knees directly below your hips  Pull your belly button in toward your spine  Do not flatten or arch your back  Tighten your abdominal muscles  Raise one arm straight out so that it is aligned with your head  Next, raise the leg opposite your arm  Hold this position for 15 seconds  Lower your arm and leg slowly and change sides  Do 5 sets  © Copyright [employer] 2022 Information is for End User's use only and may not be sold, redistributed or otherwise used for commercial purposes  All illustrations and images included in CareNotes® are the copyrighted property of [employer]  or [facility] [place]   The above information is an  only  It is not intended as medical advice for individual conditions or treatments  Talk to your doctor, nurse or pharmacist before following any medical regimen to see if it is safe and effective for you

## 2022-08-10 NOTE — ASSESSMENT & PLAN NOTE
Referral to [facility]   Medrol Dosepak   Robaxin as needed  Ice low back 20 minutes 4-5 times daily   Exercises provided

## 2022-08-11 ENCOUNTER — NURSE TRIAGE (OUTPATIENT)
Dept: PHYSICAL THERAPY | Facility: OTHER | Age: 82
End: 2022-08-11

## 2022-08-11 DIAGNOSIS — M54.50 ACUTE LEFT-SIDED LOW BACK PAIN, UNSPECIFIED WHETHER SCIATICA PRESENT: Primary | ICD-10-CM

## 2022-08-11 NOTE — TELEPHONE ENCOUNTER
Additional Information   Negative: Is this related to an MVA?  Negative: Is this related to a work injury?  Negative: Are you currently recieving homecare services?  Negative: Has the patient had unexplained weight loss?  Negative: Does the patient have a fever?  Negative: Is the patient experiencing urine retention?  Negative: Is the patient experiencing acute drop foot or paralysis?  Negative: Has the patient experienced major trauma? (fall from height, high speed collision, direct blow to spine) and is also experiencing nausea, light-headedness, or loss of consciousness?  Negative: Is the patient experiencing blood in sputum?  Negative: Is this a chronic condition? Background - Initial Assessment  Clinical complaint: Left sided low back pain that radiates down LLE  Date of onset: Pt has hx of same-Current complaints started 5-6 weeks (7/9/22?)  ago-Denies injury  Frequency of pain: intermittent  Quality of pain: dull ache    Protocols used: [facility] PRO[facility] PROTOCOL    Patient seen at [address] yesterday- Please see notes       This RN did review the [facility] in detail and what we offer for their back or neck pain  Patient did participate in physical therapy 12/2021 for similar s/s/Sciatica dx  Patient stated her provider suggested she return as it was effective tx and resolved prior complaints  Patient is agreeable to triage and would like to proceed w/ Evaluation/Sessions with [facility] therapist     Triaged and NO RF s/s present    Patients information was sent to the preferred [facility] site and patient made aware clerical would be calling to schedule appointment  Contact information for site was provided for the patient as well  Patient appreciative of call   Nurse wished patient well and referral closed per protocol  Patient did not voice any questions and/or concerns  All information about patients intended careplan reviewed   Patient in agreement with nurse's explanation of referral and treatment plan

## 2022-08-15 DIAGNOSIS — K21.9 GASTROESOPHAGEAL REFLUX DISEASE WITHOUT ESOPHAGITIS: ICD-10-CM

## 2022-08-15 DIAGNOSIS — I10 ESSENTIAL HYPERTENSION: ICD-10-CM

## 2022-08-15 RX ORDER — VALSARTAN 80 MG/1
80 TABLET ORAL DAILY
Qty: 90 TABLET | Refills: 3 | Status: SHIPPED | OUTPATIENT
Start: 2022-08-15

## 2022-08-15 RX ORDER — VALSARTAN 80 MG/1
TABLET ORAL
Qty: 90 TABLET | Refills: 3 | OUTPATIENT
Start: 2022-08-15

## 2022-08-15 RX ORDER — OMEPRAZOLE 20 MG/1
20 CAPSULE, DELAYED RELEASE ORAL DAILY
Qty: 90 CAPSULE | Refills: 3 | Status: SHIPPED | OUTPATIENT
Start: 2022-08-15

## 2022-08-15 RX ORDER — OMEPRAZOLE 20 MG/1
CAPSULE, DELAYED RELEASE ORAL
Qty: 90 CAPSULE | Refills: 3 | OUTPATIENT
Start: 2022-08-15

## 2022-08-16 ENCOUNTER — EVALUATION (OUTPATIENT)
Dept: PHYSICAL THERAPY | Facility: CLINIC | Age: 82
End: 2022-08-16
Payer: MEDICARE

## 2022-08-16 DIAGNOSIS — M54.50 ACUTE LEFT-SIDED LOW BACK PAIN, UNSPECIFIED WHETHER SCIATICA PRESENT: Primary | ICD-10-CM

## 2022-08-16 PROCEDURE — 97110 THERAPEUTIC EXERCISES: CPT | Performed by: PHYSICAL THERAPIST

## 2022-08-16 PROCEDURE — 97161 PT EVAL LOW COMPLEX 20 MIN: CPT | Performed by: PHYSICAL THERAPIST

## 2022-08-16 NOTE — PROGRESS NOTES
PT EVALUATION    Today's date: 22  Patient name: Ken Shane  : 1940  MRN: [id number]  Referring provider: Maurene Essex, PT  Dx:   1  Acute left-sided low back pain, unspecified whether sciatica present        Ken Shane is a 80 y o  female who presents with signs and symptoms consistent with sub-acute left low back pain with radiculopathy  Directional preference was not identified during this treatment as radicular symptoms were not present or reproduced with repeated movements  Due to recurring episodes of radicular symptoms, treatment will focus initially on stabilization with a trial of light extension  This patient would benefit from skilled physical therapy to address their listed impairments and functional limitations to maximize functional outcome  Impairments:    restricted ROM    decreased strength   pain with function   activity intolerance     Prognosis:  Excellent  Positive and negative prognostic indicator(s):  prior success with conservative care    Goals:    STG Patient is independent with HEP   STG Range of motion is improved by 25% in 2 weeks  LTG Range of motion is improved by 50% in 4 weeks  LTG ADL performance improved to prior level of function in 6 weeks    Planned interventions:  home exercise program, patient education, manual therapy, graded activity, flexibility, functional range of motion exercises and abdominal trunk stabilization    Duration in visits:  6  Frequency: 2 visits per week  Duration in weeks:  3    History of Current Injury:   Patient reports onset left buttock and left lateral thigh, knee and lower leg pain starting in 22 when on vacation and using a new bed  She is on her last day of prednisone and used one muscle relaxer when pain was severe  Patient is very active and walks and she usually goes to the gym 3-4 times a week  In the summer the gym hours don't work out for her schedule so her activity level suddenly decreased     She had prior history of similar symptoms last year  Pain location: left side of the sacrum, down to the lateral thigh/knee and all around lower leg  Pain descriptors:  aching  Pain intensity:  Currently 2-3/10, previously 9/10    Aggravating factors: sit to stand transfers  Easing factors: sitting    Patient goals:  decreased pain and independence with ADLs    Objective        Precautions: none    Defers prone lying  Manuals 8/16            Nerve mobilizations, S1                                                    Neuro Re-Ed             Bracing with fallout/kickout etc             Bracing with pball push downs             Bracing with march                                                                 Ther Ex             Glut stretch Not felt            Sciatic nn flossing supine 20            Bridges  20            clamshells 20            Lumbar ext in standing 10            Bridge with ER             Bridge with IR                                                                              Ther Activity                                       Gait Training                                       Modalities                                        ; I Training                                       Modalities                                        ; I

## 2022-08-22 ENCOUNTER — RA CDI HCC (OUTPATIENT)
Dept: OTHER | Facility: HOSPITAL | Age: 82
End: 2022-08-22

## 2022-08-22 NOTE — PROGRESS NOTES
[address]  coding opportunities       Chart reviewed, no opportunity found: CHART REVIEWED, NO OPPORTUNITY FOUND        Patients Insurance     Medicare Insurance: Medicare

## 2022-08-23 ENCOUNTER — OFFICE VISIT (OUTPATIENT)
Dept: PHYSICAL THERAPY | Facility: CLINIC | Age: 82
End: 2022-08-23
Payer: MEDICARE

## 2022-08-23 DIAGNOSIS — M54.50 ACUTE LEFT-SIDED LOW BACK PAIN, UNSPECIFIED WHETHER SCIATICA PRESENT: Primary | ICD-10-CM

## 2022-08-23 PROCEDURE — 97112 NEUROMUSCULAR REEDUCATION: CPT | Performed by: PHYSICAL THERAPIST

## 2022-08-23 PROCEDURE — 97110 THERAPEUTIC EXERCISES: CPT | Performed by: PHYSICAL THERAPIST

## 2022-08-23 NOTE — PROGRESS NOTES
Assessment/Plan:       Diagnoses and all orders for this visit:    Essential hypertension  -     Comprehensive metabolic panel  -     CBC and differential    Pure hypercholesterolemia  -     Lipid panel    Gastroesophageal reflux disease without esophagitis    Hiatal hernia    Hordeolum externum of right lower eyelid  -     tobramycin (TOBREX) 0 3 % SOLN; Administer 1 drop to the right eye 3 (three) times a day    Medicare annual wellness visit, subsequent    Encounter for screening for other disorder           Continue with current medications  Repeat labs  Flu vaccine in the fall  Re stye right lower eyelid  Warm compresses, lid hygiene  Start topical antibiotics  Follow-up with ophthalmology if no improvement  Office visit 6 months  Patient ID: Stephanie Cortez is a 80 y o  female  Follow up visit  Medications reviewed  Labs 10/2021 see note  Hypertension blood pressures have been stable on Amlodipine 5 mg daily, Valsartan 80 mg daily and HCTZ 25 mg daily  Creatinine 0 89  GFR 62 Electrolytes normal  K+ 4 6  on K+ supplement  Hgb 14 0  Hyperlipidemia on Simvastatin 20 mg/day 10/2021 lipid profile cholesterol 200  Triglycerides 103  HDL 72    LFTs normal          Lab Results   Component Value Date    WBC 5 64 10/13/2021    HGB 14 4 10/13/2021    HCT 44 2 10/13/2021    MCV 87 10/13/2021     10/13/2021     Lab Results   Component Value Date    SODIUM 137 10/13/2021    K 4 0 10/13/2021     10/13/2021    CO2 30 10/13/2021    AGAP 2 (L) 10/13/2021    BUN 15 10/13/2021    CREATININE 0 89 10/13/2021    GLUF 100 (H) 10/13/2021    CALCIUM 9 6 10/13/2021    AST 24 10/13/2021    ALT 27 10/13/2021    ALKPHOS 89 10/13/2021    TP 7 2 10/13/2021    TBILI 0 95 10/13/2021    EGFR 62 10/13/2021     Lab Results   Component Value Date    CHOLESTEROL 200 10/13/2021    CHOLESTEROL 196 03/18/2021    CHOLESTEROL 203 (H) 06/15/2020     Lab Results   Component Value Date    HDL 72 10/13/2021    HDL 67 03/18/2021    HDL 76 06/15/2020     Lab Results   Component Value Date    TRIG 103 10/13/2021    TRIG 83 03/18/2021    TRIG 100 06/15/2020     Lab Results   Component Value Date    LDLCALC 107 (H) 10/13/2021     Lab Results   Component Value Date    [id number] 1 550 03/18/2021         The following portions of the patient's history were reviewed and updated as appropriate: allergies, current medications, past family history, past medical history, past social history, past surgical history and problem list     Review of Systems   Constitutional: Negative for appetite change, chills, fatigue, fever and unexpected weight change  HENT: Positive for hearing loss (bilateral hearing aids  )  Negative for congestion, ear pain, postnasal drip, rhinorrhea, sore throat and trouble swallowing  Prior ENT evaluation for recurrent sore throat LPR   Eyes: Positive for redness  Negative for photophobia, pain, discharge, itching and visual disturbance  Mild redness R lower eyelid    Respiratory: Negative for cough, shortness of breath and wheezing  Cardiovascular: Negative for chest pain, palpitations and leg swelling  History of DVT left leg treated with Eliquis  No recurrence  No FH venous thrombosis  Gastrointestinal: Negative for abdominal pain, blood in stool, constipation, diarrhea, nausea and vomiting  GERD stable on Famotidine 20 mg daily  She uses infrequent prn  Omeprazole 20 mg  No dysphagia  No history of Mauro's  04/2021 EGD Normal except for hiatal hernia  04/2021 colonoscopy normal except for mild diverticulosis sigmoid colon   Endocrine:        Lab Results       Component                Value               Date                       [id number]             1 550               03/18/2021               Genitourinary: Negative for difficulty urinating  Musculoskeletal: Negative for arthralgias and myalgias  OV this month for recurrent left lumbar radiculopathy  Significant improvement with Medrol Dose Jose Armando  She is using prn Robaxin and has started PT  She uses prn Advil  Skin: Negative for rash  Neurological: Negative for dizziness and headaches  Hematological: Negative for adenopathy  Does not bruise/bleed easily  Psychiatric/Behavioral: Negative for dysphoric mood and sleep disturbance  Objective:    /62   Pulse 60   Temp (!) 96 3 °F (35 7 °C)   Resp 16   Ht 5' 4" (1 626 m)   Wt 91 6 kg (202 lb)   BMI 34 67 kg/m²     BP Readings from Last 3 Encounters:   08/24/22 128/62   08/10/22 134/74   05/18/22 140/84     Wt Readings from Last 3 Encounters:   08/24/22 91 6 kg (202 lb)   08/10/22 90 3 kg (199 lb)   05/27/22 94 8 kg (209 lb)          Physical Exam  Vitals and nursing note reviewed  Constitutional:       General: She is not in acute distress  Appearance: She is well-developed  HENT:      Right Ear: Tympanic membrane and ear canal normal       Left Ear: Tympanic membrane and ear canal normal    Eyes:      General: No scleral icterus  Right eye: Hordeolum (R lower eyelid ) present  No discharge  Extraocular Movements: Extraocular movements intact  Conjunctiva/sclera: Conjunctivae normal       Pupils: Pupils are equal, round, and reactive to light  Neck:      Thyroid: No thyroid mass or thyromegaly  Vascular: No carotid bruit or JVD  Trachea: No tracheal deviation  Cardiovascular:      Rate and Rhythm: Normal rate and regular rhythm  Heart sounds: Normal heart sounds  No murmur heard  No gallop  Pulmonary:      Effort: Pulmonary effort is normal  No respiratory distress  Breath sounds: Normal breath sounds  No wheezing or rales  Chest:   Breasts:      Right: No supraclavicular adenopathy  Left: No supraclavicular adenopathy  Musculoskeletal:      Right lower leg: No edema  Left lower leg: No edema  Lymphadenopathy:      Cervical: No cervical adenopathy        Upper Body: Right upper body: No supraclavicular adenopathy  Left upper body: No supraclavicular adenopathy  Skin:     Findings: No rash  Nails: There is no clubbing  Neurological:      General: No focal deficit present  Mental Status: She is alert and oriented to person, place, and time     Psychiatric:         Mood and Affect: Mood normal          Behavior: Behavior normal          Cognition and Memory: Cognition normal

## 2022-08-23 NOTE — PROGRESS NOTES
Daily Note     Today's date: 2022  Patient name: Raoul Haynes  : 1940  MRN: [id number]  Referring provider: Megan Kaur, PT  Dx:   Encounter Diagnosis     ICD-10-CM    1  Acute left-sided low back pain, unspecified whether sciatica present  M54 50                   Subjective: patient reports her symptoms are on and off and tend to occur with direct pressure when sitting  She will have a few days of no pain mixed with the days that are sore to sit      Objective: See treatment diary below      Assessment: Tolerated treatment well  Patient exhibited good technique with therapeutic exercises and would benefit from continued PT  Progress exercises with focus on ROM and light trunk extension and hip flexibility    Plan: Progress treatment as tolerated         Precautions: none    Defers prone lying  Manuals            Nerve mobilizations, S1  SY                                                  Neuro Re-Ed             Bracing with fallout/kickout etc             Bracing with pball push downs             Bracing with march                                                                 Ther Ex             Glut stretch N :15x5           Sciatic nn flossing supine 20 20           Bridges  20 20           clamshells 20 20           Lumbar ext in standing 10 15           Bridge with ER  20           Bridge with IR  20                                                                            Ther Activity                                       Gait Training                                       Modalities

## 2022-08-24 ENCOUNTER — OFFICE VISIT (OUTPATIENT)
Dept: FAMILY MEDICINE CLINIC | Facility: CLINIC | Age: 82
End: 2022-08-24
Payer: MEDICARE

## 2022-08-24 VITALS
DIASTOLIC BLOOD PRESSURE: 62 MMHG | TEMPERATURE: 96.3 F | HEIGHT: 64 IN | BODY MASS INDEX: 34.49 KG/M2 | RESPIRATION RATE: 16 BRPM | WEIGHT: 202 LBS | HEART RATE: 60 BPM | SYSTOLIC BLOOD PRESSURE: 128 MMHG

## 2022-08-24 DIAGNOSIS — Z13.89 ENCOUNTER FOR SCREENING FOR OTHER DISORDER: ICD-10-CM

## 2022-08-24 DIAGNOSIS — E78.00 PURE HYPERCHOLESTEROLEMIA: ICD-10-CM

## 2022-08-24 DIAGNOSIS — I10 ESSENTIAL HYPERTENSION: Primary | ICD-10-CM

## 2022-08-24 DIAGNOSIS — K21.9 GASTROESOPHAGEAL REFLUX DISEASE WITHOUT ESOPHAGITIS: ICD-10-CM

## 2022-08-24 DIAGNOSIS — K44.9 HIATAL HERNIA: ICD-10-CM

## 2022-08-24 DIAGNOSIS — H00.012 HORDEOLUM EXTERNUM OF RIGHT LOWER EYELID: ICD-10-CM

## 2022-08-24 DIAGNOSIS — Z00.00 MEDICARE ANNUAL WELLNESS VISIT, SUBSEQUENT: ICD-10-CM

## 2022-08-24 PROBLEM — Z12.31 SCREENING MAMMOGRAM, ENCOUNTER FOR: Status: RESOLVED | Noted: 2020-11-05 | Resolved: 2022-08-24

## 2022-08-24 PROBLEM — M54.42 ACUTE LEFT-SIDED LOW BACK PAIN WITH LEFT-SIDED SCIATICA: Status: RESOLVED | Noted: 2022-08-10 | Resolved: 2022-08-24

## 2022-08-24 PROCEDURE — G0444 DEPRESSION SCREEN ANNUAL: HCPCS | Performed by: FAMILY MEDICINE

## 2022-08-24 PROCEDURE — 99214 OFFICE O/P EST MOD 30 MIN: CPT | Performed by: FAMILY MEDICINE

## 2022-08-24 PROCEDURE — G0439 PPPS, SUBSEQ VISIT: HCPCS | Performed by: FAMILY MEDICINE

## 2022-08-24 RX ORDER — TOBRAMYCIN 3 MG/ML
1 SOLUTION/ DROPS OPHTHALMIC 3 TIMES DAILY
Qty: 5 ML | Refills: 0 | Status: SHIPPED | OUTPATIENT
Start: 2022-08-24

## 2022-08-24 NOTE — PATIENT INSTRUCTIONS
Medicare Preventive Visit Patient Instructions  Thank you for completing your Welcome to Medicare Visit or Medicare Annual Wellness Visit today  Your next wellness visit will be due in one year (8/25/2023)  The screening/preventive services that you may require over the next 5-10 years are detailed below  Some tests may not apply to you based off risk factors and/or age  Screening tests ordered at today's visit but not completed yet may show as past due  Also, please note that scanned in results may not display below  Preventive Screenings:  Service Recommendations Previous Testing/Comments   Colorectal Cancer Screening  * Colonoscopy    * Fecal Occult Blood Test (FOBT)/Fecal Immunochemical Test (FIT)  * Fecal DNA/Cologuard Test  * Flexible Sigmoidoscopy Age: 39-70 years old   Colonoscopy: every 10 years (may be performed more frequently if at higher risk)  OR  FOBT/FIT: every 1 year  OR  Cologuard: every 3 years  OR  Sigmoidoscopy: every 5 years  Screening may be recommended earlier than age 39 if at higher risk for colorectal cancer  Also, an individualized decision between you and your healthcare provider will decide whether screening between the ages of 74-80 would be appropriate  Colonoscopy: 04/27/2021  FOBT/FIT: Not on file  Cologuard: Not on file  Sigmoidoscopy: Not on file    Screening Current     Breast Cancer Screening Age: 36 years old  Frequency: every 1-2 years  Not required if history of left and right mastectomy Mammogram: 10/13/2021    Screening Current   Cervical Cancer Screening Between the ages of 21-29, pap smear recommended once every 3 years  Between the ages of 33-67, can perform pap smear with HPV co-testing every 5 years     Recommendations may differ for women with a history of total hysterectomy, cervical cancer, or abnormal pap smears in past  Pap Smear: 05/18/2022    Screening Not Indicated   Hepatitis C Screening Once for adults born between 1945 and 1965  More frequently in patients at high risk for Hepatitis C Hep C Antibody: Not on file    Screening Current   Diabetes Screening 1-2 times per year if you're at risk for diabetes or have pre-diabetes Fasting glucose: 100 mg/dL (10/13/2021)  A1C: No results in last 5 years (No results in last 5 years)  Screening Current   Cholesterol Screening Once every 5 years if you don't have a lipid disorder  May order more often based on risk factors  Lipid panel: 10/13/2021    Screening Not Indicated  History Lipid Disorder     Other Preventive Screenings Covered by Medicare:  1  Abdominal Aortic Aneurysm (AAA) Screening: covered once if your at risk  You're considered to be at risk if you have a family history of AAA  2  Lung Cancer Screening: covers low dose CT scan once per year if you meet all of the following conditions: (1) Age 50-69; (2) No signs or symptoms of lung cancer; (3) Current smoker or have quit smoking within the last 15 years; (4) You have a tobacco smoking history of at least 20 pack years (packs per day multiplied by number of years you smoked); (5) You get a written order from a healthcare provider  3  Glaucoma Screening: covered annually if you're considered high risk: (1) You have diabetes OR (2) Family history of glaucoma OR (3)  aged 48 and older OR (3)  American aged 72 and older  3  Osteoporosis Screening: covered every 2 years if you meet one of the following conditions: (1) You're estrogen deficient and at risk for osteoporosis based off medical history and other findings; (2) Have a vertebral abnormality; (3) On glucocorticoid therapy for more than 3 months; (4) Have primary hyperparathyroidism; (5) On osteoporosis medications and need to assess response to drug therapy  · Last bone density test (DXA Scan): Not on file  5  HIV Screening: covered annually if you're between the age of 12-76   Also covered annually if you are younger than 13 and older than 72 with risk factors for HIV infection  For pregnant patients, it is covered up to 3 times per pregnancy  Immunizations:  Immunization Recommendations   Influenza Vaccine Annual influenza vaccination during flu season is recommended for all persons aged >= 6 months who do not have contraindications   Pneumococcal Vaccine   * Pneumococcal conjugate vaccine = PCV13 (Prevnar 13), PCV15 (Vaxneuvance), PCV20 (Prevnar 20)  * Pneumococcal polysaccharide vaccine = PPSV23 (Pneumovax) Adults 25-60 years old: 1-3 doses may be recommended based on certain risk factors  Adults 72 years old: 1-2 doses may be recommended based off what pneumonia vaccine you previously received   Hepatitis B Vaccine 3 dose series if at intermediate or high risk (ex: diabetes, end stage renal disease, liver disease)   Tetanus (Td) Vaccine - COST NOT COVERED BY MEDICARE PART B Following completion of primary series, a booster dose should be given every 10 years to maintain immunity against tetanus  Td may also be given as tetanus wound prophylaxis  Tdap Vaccine - COST NOT COVERED BY MEDICARE PART B Recommended at least once for all adults  For pregnant patients, recommended with each pregnancy  Shingles Vaccine (Shingrix) - COST NOT COVERED BY MEDICARE PART B  2 shot series recommended in those aged 48 and above     Health Maintenance Due:      Topic Date Due    Hepatitis C Screening  10/02/2023 (Originally 1940)    Colorectal Cancer Screening  04/27/2026     Immunizations Due:      Topic Date Due    Pneumococcal Vaccine: 65+ Years (2 - PPSV23 or PCV20) 09/19/2018    COVID-19 Vaccine (3 - Booster for [employer] series) 08/21/2021    Influenza Vaccine (1) 09/01/2022     Advance Directives   What are advance directives? Advance directives are legal documents that state your wishes and plans for medical care  These plans are made ahead of time in case you lose your ability to make decisions for yourself   Advance directives can apply to any medical decision, such as the treatments you want, and if you want to donate organs  What are the types of advance directives? There are many types of advance directives, and each state has rules about how to use them  You may choose a combination of any of the following:  · Living will: This is a written record of the treatment you want  You can also choose which treatments you do not want, which to limit, and which to stop at a certain time  This includes surgery, medicine, IV fluid, and tube feedings  · Durable power of  for healthcare [facility]): This is a written record that states who you want to make healthcare choices for you when you are unable to make them for yourself  This person, called a proxy, is usually a family member or a friend  You may choose more than 1 proxy  · Do not resuscitate (DNR) order:  A DNR order is used in case your heart stops beating or you stop breathing  It is a request not to have certain forms of treatment, such as CPR  A DNR order may be included in other types of advance directives  · Medical directive: This covers the care that you want if you are in a coma, near death, or unable to make decisions for yourself  You can list the treatments you want for each condition  Treatment may include pain medicine, surgery, blood transfusions, dialysis, IV or tube feedings, and a ventilator (breathing machine)  · Values history: This document has questions about your views, beliefs, and how you feel and think about life  This information can help others choose the care that you would choose  Why are advance directives important? An advance directive helps you control your care  Although spoken wishes may be used, it is better to have your wishes written down  Spoken wishes can be misunderstood, or not followed  Treatments may be given even if you do not want them  An advance directive may make it easier for your family to make difficult choices about your care     Weight Management   Why it is important to manage your weight:  Being overweight increases your risk of health conditions such as heart disease, high blood pressure, type 2 diabetes, and certain types of cancer  It can also increase your risk for osteoarthritis, sleep apnea, and other respiratory problems  Aim for a slow, steady weight loss  Even a small amount of weight loss can lower your risk of health problems  How to lose weight safely:  A safe and healthy way to lose weight is to eat fewer calories and get regular exercise  You can lose up about 1 pound a week by decreasing the number of calories you eat by 500 calories each day  Healthy meal plan for weight management:  A healthy meal plan includes a variety of foods, contains fewer calories, and helps you stay healthy  A healthy meal plan includes the following:  · Eat whole-grain foods more often  A healthy meal plan should contain fiber  Fiber is the part of grains, fruits, and vegetables that is not broken down by your body  Whole-grain foods are healthy and provide extra fiber in your diet  Some examples of whole-grain foods are whole-wheat breads and pastas, oatmeal, brown rice, and bulgur  · Eat a variety of vegetables every day  Include dark, leafy greens such as spinach, kale, logan greens, and mustard greens  Eat yellow and orange vegetables such as carrots, sweet potatoes, and winter squash  · Eat a variety of fruits every day  Choose fresh or canned fruit (canned in its own juice or light syrup) instead of juice  Fruit juice has very little or no fiber  · Eat low-fat dairy foods  Drink fat-free (skim) milk or 1% milk  Eat fat-free yogurt and low-fat cottage cheese  Try low-fat cheeses such as mozzarella and other reduced-fat cheeses  · Choose meat and other protein foods that are low in fat  Choose beans or other legumes such as split peas or lentils  Choose fish, skinless poultry (chicken or turkey), or lean cuts of red meat (beef or pork)   Before you cook meat or poultry, cut off any visible fat  · Use less fat and oil  Try baking foods instead of frying them  Add less fat, such as margarine, sour cream, regular salad dressing and mayonnaise to foods  Eat fewer high-fat foods  Some examples of high-fat foods include french fries, doughnuts, ice cream, and cakes  · Eat fewer sweets  Limit foods and drinks that are high in sugar  This includes candy, cookies, regular soda, and sweetened drinks  Exercise:  Exercise at least 30 minutes per day on most days of the week  Some examples of exercise include walking, biking, dancing, and swimming  You can also fit in more physical activity by taking the stairs instead of the elevator or parking farther away from stores  Ask your healthcare provider about the best exercise plan for you  © Copyright [address] 2018 Information is for End User's use only and may not be sold, redistributed or otherwise used for commercial purposes   All illustrations and images included in CareNotes® are the copyrighted property of A D [employer] M [employer]  or [address] [place] [address]

## 2022-08-24 NOTE — PROGRESS NOTES
Assessment and Plan:     Problem List Items Addressed This Visit        Digestive    Gastroesophageal reflux disease       Cardiovascular and Mediastinum    Essential hypertension - Primary    Relevant Orders    Comprehensive metabolic panel    CBC and differential       Other    Hiatal hernia    Hyperlipidemia    Relevant Orders    Lipid panel      Other Visit Diagnoses     Hordeolum externum of right lower eyelid        Relevant Medications    tobramycin (TOBREX) 0 3 % SOLN    Medicare annual wellness visit, subsequent        Encounter for screening for other disorder            BMI Counseling: Body mass index is 34 67 kg/m²  The BMI is above normal  Nutrition recommendations include decreasing portion sizes, consuming healthier snacks, moderation in carbohydrate intake, reducing intake of saturated and trans fat and reducing intake of cholesterol  Exercise recommendations include exercising 3-5 times per week  No pharmacotherapy was ordered  Rationale for BMI follow-up plan is due to patient being overweight or obese  Depression Screening and Follow-up Plan: Patient was screened for depression during today's encounter  They screened negative with a PHQ-2 score of 0  Preventive health issues were discussed with patient, and age appropriate screening tests were ordered as noted in patient's After Visit Summary  Personalized health advice and appropriate referrals for health education or preventive services given if needed, as noted in patient's After Visit Summary       History of Present Illness:     Patient presents for a Medicare Wellness Visit    HPI   Patient Care Team:  Nish Post MD as PCP - General (Family Medicine)  MARIELOS Peoples,      Review of Systems:     Review of Systems     Problem List:     Patient Active Problem List   Diagnosis    History of DVT of lower extremity    Essential hypertension    Fibrocystic breast changes    Gastroesophageal reflux disease    Hearing loss    Hiatal hernia    Hyperlipidemia    Varicose veins of left lower extremity    Bradycardia      Past Medical and Surgical History:     Past Medical History:   Diagnosis Date    Acid reflux     DVT (deep venous thrombosis) ([facility] [address] ) 01/2018    Hearing loss     Hypercholesterolemia     Hypertension     Impingement syndrome of left shoulder 7/12/2019    Wears glasses      Past Surgical History:   Procedure Laterality Date    BREAST BIOPSY Left     1995 left, 1997 unknown side    COLONOSCOPY      TONSILLECTOMY      TUBAL LIGATION      UPPER GASTROINTESTINAL ENDOSCOPY      WISDOM TOOTH EXTRACTION  1962      Family History:     Family History   Problem Relation Age of Onset    Diabetes Mother     Hypertension Mother     Cancer Father     Diabetes Father     Hypertension Father     Colon cancer Father 72    Congenital heart disease Daughter     Hyperlipidemia Other         pure    Lymphoma Sister 72    Lung cancer Sister 62    Skin cancer Brother 72        Melanoma    No Known Problems Maternal Grandmother     No Known Problems Maternal Grandfather     No Known Problems Paternal Grandmother     No Known Problems Paternal Grandfather     Prostate cancer Brother 72    No Known Problems Daughter     Breast cancer Other 54      Social History:     Social History     Socioeconomic History    Marital status: /Civil Union     Spouse name: None    Number of children: None    Years of education: None    Highest education level: None   Occupational History    None   Tobacco Use    Smoking status: Never Smoker    Smokeless tobacco: Never Used   Vaping Use    Vaping Use: Never used   Substance and Sexual Activity    Alcohol use: Yes     Comment: rare    Drug use: No    Sexual activity: Not Currently     Partners: Male     Birth control/protection: Post-menopausal, None   Other Topics Concern    None   Social History Narrative    None     Social Determinants of Health Financial Resource Strain: Low Risk     Difficulty of Paying Living Expenses: Not hard at all   Food Insecurity: Not on file   Transportation Needs: No Transportation Needs    Lack of Transportation (Medical): No    Lack of Transportation (Non-Medical): No   Physical Activity: Not on file   Stress: Not on file   Social Connections: Not on file   Intimate Partner Violence: Not on file   Housing Stability: Not on file      Medications and Allergies:     Current Outpatient Medications   Medication Sig Dispense Refill    amLODIPine (NORVASC) 5 mg tablet TAKE 1 TABLET BY MOUTH TWO TIMES DAILY 180 tablet 3    Ascorbic Acid (VITAMIN C) 500 MG CAPS Take 1 capsule by mouth daily      Calcium Carbonate-Vitamin D3 600-400 MG-UNIT TABS Take by mouth      Cholecalciferol (VITAMIN D) 2000 units tablet Take by mouth daily      famotidine (PEPCID) 20 mg tablet Take 1 tablet (20 mg total) by mouth in the morning  90 tablet 1    folic acid (FOLVITE) 489 MCG tablet Take 1 tablet by mouth daily      hydrochlorothiazide (HYDRODIURIL) 25 mg tablet TAKE 1 TABLET BY MOUTH EVERY DAY 90 tablet 3    methocarbamol (ROBAXIN) 500 mg tablet Take 1 tablet (500 mg total) by mouth daily at bedtime as needed for muscle spasms 30 tablet 0    ofloxacin (FLOXIN) 0 3 % otic solution 4 gtts to affected ear bid x 7 days 5 mL 0    omeprazole (PriLOSEC) 20 mg delayed release capsule Take 1 capsule (20 mg total) by mouth daily 90 capsule 3    potassium chloride (K-DUR,KLOR-CON) 10 mEq tablet Take 1 tablet (10 mEq total) by mouth in the morning  90 tablet 3    simvastatin (ZOCOR) 20 mg tablet TAKE 1 TABLET BY MOUTH EVERY DAY 90 tablet 1    tobramycin (TOBREX) 0 3 % SOLN Administer 1 drop to the right eye 3 (three) times a day 5 mL 0    valsartan (DIOVAN) 80 mg tablet Take 1 tablet (80 mg total) by mouth daily 90 tablet 3     No current facility-administered medications for this visit       Allergies   Allergen Reactions    Sulfa Antibiotics Other (See Comments)     Unknown reaction     Motrin [Ibuprofen] GI Intolerance     If on prescription strength, over the counter she does fine with      Immunizations:     Immunization History   Administered Date(s) Administered    COVID-19 PFIZER VACCINE 0 3 ML IM 02/28/2021, 03/21/2021    Hep A, adult 10/05/2019, 10/21/2019    INFLUENZA 09/26/2011, 09/17/2012, 10/21/2013, 10/03/2014, 10/08/2015, 10/18/2016, 09/19/2017, 10/09/2018, 09/26/2019    Influenza, high dose seasonal 0 7 mL 10/28/2020, 11/03/2021    Pneumococcal Conjugate 13-Valent 12/16/2015, 09/19/2017    Pneumococcal Polysaccharide PPV23 10/17/2007    Tdap 09/19/2019    Zoster 03/18/2009    Zoster Vaccine Recombinant 09/05/2019, 01/20/2020, 03/12/2020      Health Maintenance:         Topic Date Due    Hepatitis C Screening  10/02/2023 (Originally 1940)    Colorectal Cancer Screening  04/27/2026         Topic Date Due    Pneumococcal Vaccine: 65+ Years (2 - PPSV23 or PCV20) 09/19/2018    COVID-19 Vaccine (3 - Booster for [employer] series) 08/21/2021    Influenza Vaccine (1) 09/01/2022      Medicare Screening Tests and Risk Assessments:     Kellie Swan is here for her Subsequent Wellness visit  Last Medicare Wellness visit information reviewed, patient interviewed and updates made to the record today  Health Risk Assessment:   Patient rates overall health as excellent  Patient feels that their physical health rating is same  Patient is very satisfied with their life  Eyesight was rated as same  Hearing was rated as same  Patient feels that their emotional and mental health rating is same  Patients states they are never, rarely angry  Patient states they are sometimes unusually tired/fatigued  Pain experienced in the last 7 days has been some  Patient's pain rating has been 4/10  Patient states that she has experienced no weight loss or gain in last 6 months       Depression Screening:   PHQ-2 Score: 0      Fall Risk Screening: In the past year, patient has experienced: no history of falling in past year      Urinary Incontinence Screening:   Patient has not leaked urine accidently in the last six months  Home Safety:  Patient does not have trouble with stairs inside or outside of their home  Patient has working smoke alarms and has working carbon monoxide detector  Home safety hazards include: none  Nutrition:   Current diet is Regular  Medications:   Patient is currently taking over-the-counter supplements  OTC medications include: Vitamins, folic acid, calcium with D  Patient is able to manage medications  Activities of Daily Living (ADLs)/Instrumental Activities of Daily Living (IADLs):   Walk and transfer into and out of bed and chair?: Yes  Dress and groom yourself?: Yes    Bathe or shower yourself?: Yes    Feed yourself? Yes  Do your laundry/housekeeping?: Yes  Manage your money, pay your bills and track your expenses?: Yes  Make your own meals?: Yes    Do your own shopping?: Yes    Previous Hospitalizations:   Any hospitalizations or ED visits within the last 12 months?: No      Advance Care Planning:   Living will: Yes    Durable POA for healthcare:  Yes    Advanced directive: Yes      Cognitive Screening:   Provider or family/friend/caregiver concerned regarding cognition?: No    PREVENTIVE SCREENINGS      Cardiovascular Screening:    General: History Lipid Disorder    Due for: Lipid Panel      Diabetes Screening:     General: Screening Current    Due for: Blood Glucose      Colorectal Cancer Screening:     General: Screening Current      Breast Cancer Screening:     General: Screening Current    Due for: Mammogram        Cervical Cancer Screening:    General: Screening Not Indicated      Osteoporosis Screening:    General: Risks and Benefits Discussed and Patient Declines      Abdominal Aortic Aneurysm (AAA) Screening:        General: Screening Not Indicated      Lung Cancer Screening:     General: Screening Not Indicated      Hepatitis C Screening:    General: Screening Current    Screening, Brief Intervention, and Referral to Treatment (SBIRT)    Screening  Typical number of drinks in a day: 0  Typical number of drinks in a week: 0  Interpretation: Low risk drinking behavior  AUDIT-C Screenin) How often did you have a drink containing alcohol in the past year? monthly or less  2) How many drinks did you have on a typical day when you were drinking in the past year? 0  3) How often did you have 6 or more drinks on one occasion in the past year? never    AUDIT-C Score: 1  Interpretation: Score 0-2 (female): Negative screen for alcohol misuse    Single Item Drug Screening:  How often have you used an illegal drug (including marijuana) or a prescription medication for non-medical reasons in the past year? never    Single Item Drug Screen Score: 0  Interpretation: Negative screen for possible drug use disorder    Brief Intervention  Alcohol & drug use screenings were reviewed  No concerns regarding substance use disorder identified  Other Counseling Topics:   Regular weightbearing exercise and calcium and vitamin D intake       No exam data present     Physical Exam:     /62   Pulse 60   Temp (!) 96 3 °F (35 7 °C)   Resp 16   Ht 5' 4" (1 626 m)   Wt 91 6 kg (202 lb)   BMI 34 67 kg/m²     Physical Exam     Nuno Kat MD

## 2022-08-25 ENCOUNTER — OFFICE VISIT (OUTPATIENT)
Dept: PHYSICAL THERAPY | Facility: CLINIC | Age: 82
End: 2022-08-25
Payer: MEDICARE

## 2022-08-25 DIAGNOSIS — M54.50 ACUTE LEFT-SIDED LOW BACK PAIN, UNSPECIFIED WHETHER SCIATICA PRESENT: Primary | ICD-10-CM

## 2022-08-25 PROCEDURE — 97110 THERAPEUTIC EXERCISES: CPT | Performed by: PHYSICAL THERAPIST

## 2022-08-25 PROCEDURE — 97112 NEUROMUSCULAR REEDUCATION: CPT | Performed by: PHYSICAL THERAPIST

## 2022-08-30 ENCOUNTER — OFFICE VISIT (OUTPATIENT)
Dept: PHYSICAL THERAPY | Facility: CLINIC | Age: 82
End: 2022-08-30
Payer: MEDICARE

## 2022-08-30 DIAGNOSIS — M54.50 ACUTE LEFT-SIDED LOW BACK PAIN, UNSPECIFIED WHETHER SCIATICA PRESENT: Primary | ICD-10-CM

## 2022-08-30 PROCEDURE — 97112 NEUROMUSCULAR REEDUCATION: CPT | Performed by: PHYSICAL THERAPIST

## 2022-08-30 PROCEDURE — 97110 THERAPEUTIC EXERCISES: CPT | Performed by: PHYSICAL THERAPIST

## 2022-08-30 NOTE — PROGRESS NOTES
Daily Note     Today's date: 2022  Patient name: Raoul Haynes  : 1940  MRN: [id number]  Referring provider: Megan Kaur, RAVINDER  Dx:   Encounter Diagnosis     ICD-10-CM    1  Acute left-sided low back pain, unspecified whether sciatica present  M54 52                   Subjective: patient reports no issues over the weekend, she is doing well      Objective: See treatment diary below      Assessment: Tolerated treatment well  Patient exhibited good technique with therapeutic exercises and would benefit from continued PT  Continued with current plan of care, possible discharge soon if she maintains current status      Plan: Progress treatment as tolerated         Precautions: none    Defers prone lying  Manuals          Nerve mobilizations, S1  SY SY SY                                   Nu step  6' lvl 3 8' lvl 5 8' lvl 5         Neuro Re-Ed             Bracing with fallout/kickout etc             Bracing with pball push downs             Bracing with march   15ea 15ea                                                             Ther Ex             Glut stretch N :15x5 :15x5 ea :15x5ea         Sciatic nn flossing supine 20 20 20ea 20ea         Bridges  20 20           clamshells 20 20 :05x20 :05x20         Lumbar ext in standing 10 15  15         Bridge with ER  20 :05x20 :05x20         Bridge with IR  20 :05x20 :05x20         LTR   :[id number] :05x20                                                             Ther Activity                                       Gait Training                                       Modalities

## 2022-09-01 ENCOUNTER — OFFICE VISIT (OUTPATIENT)
Dept: PHYSICAL THERAPY | Facility: CLINIC | Age: 82
End: 2022-09-01
Payer: MEDICARE

## 2022-09-01 DIAGNOSIS — M54.50 ACUTE LEFT-SIDED LOW BACK PAIN, UNSPECIFIED WHETHER SCIATICA PRESENT: Primary | ICD-10-CM

## 2022-09-01 PROCEDURE — 97112 NEUROMUSCULAR REEDUCATION: CPT | Performed by: PHYSICAL THERAPIST

## 2022-09-01 PROCEDURE — 97110 THERAPEUTIC EXERCISES: CPT | Performed by: PHYSICAL THERAPIST

## 2022-09-01 NOTE — PROGRESS NOTES
Daily Note     Today's date: 2022  Patient name: Hudson Sharma  : 1940  MRN: [id number]  Referring provider: Penny Escobar, PT  Dx:   Encounter Diagnosis     ICD-10-CM    1  Acute left-sided low back pain, unspecified whether sciatica present  M54 50                   Subjective: patient reports she has been doing great for several days but woke this morning with tightness in the lateral lower leg only  No inciting event      Objective: See treatment diary below      Assessment: Tolerated treatment well  Patient exhibited good technique with therapeutic exercises and would benefit from continued PT  Area of tightness in the area of lateral soleus, (just posterior to ant  Tibialis)  No tightness reported by the end of treatment      Plan: Progress treatment as tolerated         Precautions: none    Defers prone lying  Manuals         Nerve mobilizations, S1  SY SY SY SY                                  Nu step  6' lvl 3 8' lvl 5 8' lvl 5 8' lvl 5        Neuro Re-Ed             Bracing with fallout/kickout etc             Bracing with pball push downs             Bracing with march   15ea 15ea 15ea                                                            Ther Ex             Glut stretch N :15x5 :15x5 ea :15x5ea :15x5ea        Sciatic nn flossing supine 20 20 20ea 20ea 20ea, cross leg        Bridges  20 20           clamshells 20 20 :05x20 :05x20 :05x20        Lumbar ext in standing 10 15  15 15        Bridge with ER  20 :05x20 :05x20 :05x20        Bridge with IR  20 :05x20 :05x20 :05x20        LTR   :[id number] :05x20 :5x20        Slant board stretch, legs straight and bent     :15x3ea                                               Ther Activity                                       Gait Training                                       Modalities

## 2022-09-07 ENCOUNTER — OFFICE VISIT (OUTPATIENT)
Dept: PHYSICAL THERAPY | Facility: CLINIC | Age: 82
End: 2022-09-07
Payer: MEDICARE

## 2022-09-07 DIAGNOSIS — M54.50 ACUTE LEFT-SIDED LOW BACK PAIN, UNSPECIFIED WHETHER SCIATICA PRESENT: Primary | ICD-10-CM

## 2022-09-07 PROCEDURE — 97112 NEUROMUSCULAR REEDUCATION: CPT | Performed by: PHYSICAL THERAPIST

## 2022-09-07 PROCEDURE — 97110 THERAPEUTIC EXERCISES: CPT | Performed by: PHYSICAL THERAPIST

## 2022-09-07 NOTE — PROGRESS NOTES
Daily Note     Today's date: 2022  Patient name: Estela Grimaldo  : 1940  MRN: [id number]  Referring provider: Giles Huynh PT  Dx:   Encounter Diagnosis     ICD-10-CM    1  Acute left-sided low back pain, unspecified whether sciatica present  M54 50                   Subjective: patient reports tightness in the lower left leg is variable  She notes a prior history of DVT in the left lower leg by the ankle  She plans on contacting her PCP for follow-up      Objective: See treatment diary below      Assessment: Tolerated treatment well  Patient exhibited good technique with therapeutic exercises and would benefit from continued PT  No lower leg tightness or other symptoms at the end of treatment  Plan: Progress treatment as tolerated         Precautions: none    Defers prone lying  Manuals        Nerve mobilizations, S1  SY SY SY SY SY                                 Nu step  6' lvl 3 8' lvl 5 8' lvl 5 8' lvl 5 8' LVL5       Neuro Re-Ed             Bracing with fallout/kickout etc             Bracing with pball push downs             Bracing with march   15ea 15ea 15ea 20ea alternating                                                           Ther Ex             Glut stretch N :15x5 :15x5 ea :15x5ea :15x5ea :15x5ea       Sciatic nn flossing supine 20 20 20ea 20ea 20ea, cross leg 20ea, cross leg       Bridges  20 20           clamshells 20 20 :05x20 :05x20 :05x20 :05x20       Lumbar ext in standing 10 15  15 15 15       Bridge with ER  20 :05x20 :36S27 :37S19 :51P65       Bridge with IR  20 :05x20 :21O85 :90L22 :09O91       LTR   :[id number] :05x20 :5x20 :05x20 cross leg       Slant board stretch, legs straight and bent     :15x3ea :15x3ea                                              Ther Activity                                       Gait Training                                       Modalities

## 2022-09-09 ENCOUNTER — OFFICE VISIT (OUTPATIENT)
Dept: PHYSICAL THERAPY | Facility: CLINIC | Age: 82
End: 2022-09-09
Payer: MEDICARE

## 2022-09-09 DIAGNOSIS — M54.50 ACUTE LEFT-SIDED LOW BACK PAIN, UNSPECIFIED WHETHER SCIATICA PRESENT: Primary | ICD-10-CM

## 2022-09-09 PROCEDURE — 97110 THERAPEUTIC EXERCISES: CPT | Performed by: PHYSICAL THERAPIST

## 2022-09-09 PROCEDURE — 97112 NEUROMUSCULAR REEDUCATION: CPT | Performed by: PHYSICAL THERAPIST

## 2022-09-09 NOTE — PROGRESS NOTES
PT Re-EVALUATION     Today's date: 22  Patient name: Suresh Ruiz  : 1940  MRN: [id number]  Referring provider: Isis Rodriguez PT  Dx:   1  Acute left-sided low back pain, unspecified whether sciatica present          Suresh Ruiz is a 80 y o  female who is no longer having left low back pain with left LE radicular symptoms  She has no limitations with ADLs  She will continue with exercises at home  This patient would benefit from skilled physical therapy to address their listed impairments and functional limitations to maximize functional outcome      Impairments:    restricted ROM    decreased strength   pain with function   activity intolerance      Prognosis:  Excellent  Positive and negative prognostic indicator(s):  prior success with conservative care     Goals:    STG Patient is independent with HEP (met)  STG Range of motion is improved by 25% in 2 weeks (met)  LTG Range of motion is improved by 50% in 4 weeks  LTG ADL performance improved to prior level of function in 6 weeks     Planned interventions:  home exercise program, patient education, manual therapy, graded activity, flexibility, functional range of motion exercises and abdominal trunk stabilization     Duration in visits:  6  Frequency: 2 visits per week  Duration in weeks:  3     History of Current Injury:   Patient reports onset left buttock and left lateral thigh, knee and lower leg pain starting in 22 when on vacation and using a new bed  She is on her last day of prednisone and used one muscle relaxer when pain was severe  Patient is very active and walks and she usually goes to the gym 3-4 times a week  In the summer the gym hours don't work out for her schedule so her activity level suddenly decreased    She had prior history of similar symptoms last year  Update:  22:  Patient reports no pain in the low back or leg    No functional limitations      Pain location: left side of the sacrum, down to the lateral thigh/knee and all around lower leg  Pain descriptors:  aching  Pain intensity:  0/10     Aggravating factors: sit to stand transfers  Easing factors: sitting     Patient goals:  decreased pain and independence with ADLs     Objective      Palpation   Left   No palpable tenderness to the erector spinae       Right   No palpable tenderness to the erector spinae       Active Range of Motion      Lumbar   Flexion: 70 (from 50 degrees  with pain)  Extension: 29 (from 22 degrees  with pain)  Left lateral flexion: 21 degrees    with pain  Right lateral flexion: 22 degrees   Mechanical Assessment     Cervical        Thoracic     Seated flexion: repeated movements  Pain location: no change  Seated extension: repeated movements  Pain location: no change  Lying flexion: sustained positions  Pain location: no change     Lumbar        Strength/Myotome Testing     Additional Strength Details  LE strength, wnl     Tests      Lumbar   Negative SIJ compression       Left   Negative crossed SLR, passive SLR and slump test       Right   Negative crossed SLR, passive SLR and slump test       Left Hip   Negative ROSA       Right Hip   Negative ROSA  Daily Note     Today's date: 2022  Patient name: Anthony Reese  : 1940  MRN: [id number]  Referring provider: Kim Galvan PT  Dx:   Encounter Diagnosis     ICD-10-CM    1  Acute left-sided low back pain, unspecified whether sciatica present  M54 50      Start Time: 846  Stop Time: 928  Total time in clinic (min): 42 minutes    Subjective: patient reports she has been feeling good since last treatment, no symptoms      Objective: See treatment diary below      Assessment: Tolerated treatment well  Patient exhibited good technique with therapeutic exercises and would benefit from continued PT  Resumed treatment from last visit due to good response  Plan: Progress treatment as tolerated         Precautions: none    Defers prone lying  Manuals 8/16 8/23 8/25 8/30 9/1 9/7 9/9      Nerve mobilizations, S1  SY SY SY SY SY SY                                Nu step  6' lvl 3 8' lvl 5 8' lvl 5 8' lvl 5 8' LVL5 8' LVL5      Neuro Re-Ed             Bracing with fallout/kickout etc             Bracing with pball push downs             Bracing with march   15ea 15ea 15ea 20ea alternating 20ea alternating                                                          Ther Ex             Glut stretch N :15x5 :15x5 ea :15x5ea :15x5ea :15x5ea :15x5ea      Sciatic nn flossing supine 20 20 20ea 20ea 20ea, cross leg 20ea, cross leg 20ea, cross leg      Bridges  20 20           clamshells 20 20 :05x20 :05x20 :05x20 :05x20 :05x20      Lumbar ext in standing 10 15  15 15 15 15      Bridge with ER  20 :05x20 :93G72 :27Y10 :54H64 :23C63      Bridge with IR  20 :56Q02 :69H99 :21O79 :70T04 :04X10      LTR   :[id number] :05x20 :5x20 :05x20 cross leg :05x20 cross legs      Slant board stretch, legs straight and bent     :15x3ea                                               Ther Activity                                       Gait Training                                       Modalities

## 2022-10-17 ENCOUNTER — HOSPITAL ENCOUNTER (OUTPATIENT)
Dept: RADIOLOGY | Age: 82
Discharge: HOME | End: 2022-10-17
Payer: MEDICARE

## 2022-10-17 VITALS — BODY MASS INDEX: 34.15 KG/M2 | HEIGHT: 64 IN | WEIGHT: 200 LBS

## 2022-10-17 DIAGNOSIS — Z12.31 SCREENING MAMMOGRAM, ENCOUNTER FOR: ICD-10-CM

## 2022-10-17 PROCEDURE — 77063 BREAST TOMOSYNTHESIS BI: CPT

## 2022-10-17 PROCEDURE — 77067 SCR MAMMO BI INCL CAD: CPT

## 2022-10-31 DIAGNOSIS — K21.9 GASTROESOPHAGEAL REFLUX DISEASE WITHOUT ESOPHAGITIS: ICD-10-CM

## 2022-10-31 RX ORDER — FAMOTIDINE 20 MG/1
TABLET, FILM COATED ORAL
Qty: 90 TABLET | Refills: 1 | Status: SHIPPED | OUTPATIENT
Start: 2022-10-31

## 2022-11-03 ENCOUNTER — OFFICE VISIT (OUTPATIENT)
Dept: SURGICAL ONCOLOGY | Facility: CLINIC | Age: 82
End: 2022-11-03

## 2022-11-03 VITALS
HEART RATE: 69 BPM | TEMPERATURE: 97.5 F | HEIGHT: 64 IN | SYSTOLIC BLOOD PRESSURE: 134 MMHG | RESPIRATION RATE: 16 BRPM | DIASTOLIC BLOOD PRESSURE: 80 MMHG | BODY MASS INDEX: 34.57 KG/M2 | WEIGHT: 202.5 LBS | OXYGEN SATURATION: 97 %

## 2022-11-03 DIAGNOSIS — Z12.31 SCREENING MAMMOGRAM, ENCOUNTER FOR: ICD-10-CM

## 2022-11-03 DIAGNOSIS — N60.19 FIBROCYSTIC BREAST CHANGES, UNSPECIFIED LATERALITY: Primary | ICD-10-CM

## 2022-11-03 RX ORDER — PREDNISOLONE ACETATE 10 MG/ML
SUSPENSION/ DROPS OPHTHALMIC
COMMUNITY
Start: 2022-09-08

## 2022-11-03 NOTE — PROGRESS NOTES
Surgical Oncology Follow Up       [address] [facility]  [facility] [place]  [address] [facility] [place]  [facility]  1940  [id number]  [address] [facility]  [facility]  [address] [place] [address]    Chief Complaint   Patient presents with   • Follow-up     1 year follow-up       Assessment/Plan   Diagnoses and all orders for this visit:    Fibrocystic breast changes, unspecified laterality    Screening mammogram, encounter for  -     Mammo screening bilateral w 3d & cad; Future    Other orders  -     prednisoLONE acetate (PRED FORTE) 1 % ophthalmic suspension; INSTILL 1 DROP IN THE RIGHT EYE THREE TIMES DAILY  Advance Care Planning/Advance Directives:  Did not discuss  with the patient  Oncology History:    Oncology History    No history exists  History of Present Illness: Follow-up visit secondary to fibrocystic changes, reports occasional tenderness on the left side, other concerns as noted in ROS  -Interval History:  Recent mammogram    Review of Systems:  Review of Systems   Constitutional: Negative  Negative for appetite change and fever  Eyes: Negative  Respiratory: Negative for shortness of breath  Cardiovascular: Negative  Gastrointestinal: Negative  Endocrine: Negative  Genitourinary: Negative  Musculoskeletal: Negative  Negative for arthralgias and myalgias  Skin: Positive for color change (mole right breast will be seeing derm)  Allergic/Immunologic: Negative  Neurological: Negative  Hematological: Negative  Negative for adenopathy  Does not bruise/bleed easily  Psychiatric/Behavioral: Negative          Patient Active Problem List   Diagnosis   • History of DVT of lower extremity   • Essential hypertension   • Fibrocystic breast changes   • Gastroesophageal reflux disease   • Hearing loss   • Hiatal hernia   • Hyperlipidemia   • Varicose veins of left lower extremity   • Bradycardia   • Screening mammogram, encounter for     Past Medical History:   Diagnosis Date   • Acid reflux    • DVT (deep venous thrombosis) ([facility] [address] ) 01/2018   • Hearing loss    • Hypercholesterolemia    • Hypertension    • Impingement syndrome of left shoulder 7/12/2019   • Wears glasses      Past Surgical History:   Procedure Laterality Date   • BREAST BIOPSY Left     1995 left, 1997 unknown side   • COLONOSCOPY     • TONSILLECTOMY     • TUBAL LIGATION     • UPPER GASTROINTESTINAL ENDOSCOPY     • WISDOM TOOTH EXTRACTION  1962     Family History   Problem Relation Age of Onset   • Diabetes Mother    • Hypertension Mother    • Cancer Father    • Diabetes Father    • Hypertension Father    • Colon cancer Father 72   • Congenital heart disease Daughter    • Hyperlipidemia Other         pure   • Lymphoma Sister 72   • Lung cancer Sister 62   • Skin cancer Brother 72        Melanoma   • No Known Problems Maternal Grandmother    • No Known Problems Maternal Grandfather    • No Known Problems Paternal Grandmother    • No Known Problems Paternal Grandfather    • Prostate cancer Brother 72   • No Known Problems Daughter    • Breast cancer Other 54     Social History     Socioeconomic History   • Marital status: /Civil Union     Spouse name: Not on file   • Number of children: Not on file   • Years of education: Not on file   • Highest education level: Not on file   Occupational History   • Not on file   Tobacco Use   • Smoking status: Never Smoker   • Smokeless tobacco: Never Used   Vaping Use   • Vaping Use: Never used   Substance and Sexual Activity   • Alcohol use: Yes     Comment: rare   • Drug use: No   • Sexual activity: Not Currently     Partners: Male     Birth control/protection: Post-menopausal, None   Other Topics Concern   • Not on file   Social History Narrative   • Not on file     Social Determinants of Health     Financial Resource Strain: Low Risk    • Difficulty of Paying Living Expenses: Not hard at all   Food Insecurity: Not on file   Transportation Needs: No Transportation Needs   • Lack of Transportation (Medical): No   • Lack of Transportation (Non-Medical): No   Physical Activity: Not on file   Stress: Not on file   Social Connections: Not on file   Intimate Partner Violence: Not on file   Housing Stability: Not on file       Current Outpatient Medications:   •  amLODIPine (NORVASC) 5 mg tablet, TAKE 1 TABLET BY MOUTH TWO TIMES DAILY, Disp: 180 tablet, Rfl: 3  •  Ascorbic Acid (VITAMIN C) 500 MG CAPS, Take 1 capsule by mouth daily, Disp: , Rfl:   •  Calcium Carbonate-Vitamin D3 600-400 MG-UNIT TABS, Take by mouth, Disp: , Rfl:   •  Cholecalciferol (VITAMIN D) 2000 units tablet, Take by mouth daily, Disp: , Rfl:   •  famotidine (PEPCID) 20 mg tablet, TAKE 1 TABLET BY MOUTH EVERY MORNING, Disp: 90 tablet, Rfl: 1  •  folic acid (FOLVITE) 789 MCG tablet, Take 1 tablet by mouth daily, Disp: , Rfl:   •  hydrochlorothiazide (HYDRODIURIL) 25 mg tablet, TAKE 1 TABLET BY MOUTH EVERY DAY, Disp: 90 tablet, Rfl: 3  •  methocarbamol (ROBAXIN) 500 mg tablet, Take 1 tablet (500 mg total) by mouth daily at bedtime as needed for muscle spasms, Disp: 30 tablet, Rfl: 0  •  ofloxacin (FLOXIN) 0 3 % otic solution, 4 gtts to affected ear bid x 7 days, Disp: 5 mL, Rfl: 0  •  omeprazole (PriLOSEC) 20 mg delayed release capsule, Take 1 capsule (20 mg total) by mouth daily, Disp: 90 capsule, Rfl: 3  •  potassium chloride (K-DUR,KLOR-CON) 10 mEq tablet, Take 1 tablet (10 mEq total) by mouth in the morning , Disp: 90 tablet, Rfl: 3  •  prednisoLONE acetate (PRED FORTE) 1 % ophthalmic suspension, INSTILL 1 DROP IN THE RIGHT EYE THREE TIMES DAILY  , Disp: , Rfl:   •  simvastatin (ZOCOR) 20 mg tablet, TAKE 1 TABLET BY MOUTH EVERY DAY, Disp: 90 tablet, Rfl: 1  •  valsartan (DIOVAN) 80 mg tablet, Take 1 tablet (80 mg total) by mouth daily, Disp: 90 tablet, Rfl: 3  •  tobramycin (TOBREX) 0 3 % SOLN, Administer 1 drop to the right eye 3 (three) times a day, Disp: 5 mL, Rfl: 0  Allergies   Allergen Reactions   • Sulfa Antibiotics Other (See Comments)     Unknown reaction    • Motrin [Ibuprofen] GI Intolerance     If on prescription strength, over the counter she does fine with       The following portions of the patient's history were reviewed and updated as appropriate: allergies, current medications, past family history, past medical history, past social history, past surgical history and problem list         Vitals:    11/03/22 0832   BP: 134/80   Pulse: 69   Resp: 16   Temp: 97 5 °F (36 4 °C)   SpO2: 97%       Physical Exam  Constitutional:       General: She is not in acute distress  Appearance: Normal appearance  She is well-developed  HENT:      Head: Normocephalic and atraumatic  Chest:   Breasts:      Right: Skin change (Mole appears to be a seborrheic keratosis) present  No inverted nipple, mass, nipple discharge, tenderness, axillary adenopathy or supraclavicular adenopathy  Left: No inverted nipple, mass, nipple discharge, skin change, tenderness, axillary adenopathy or supraclavicular adenopathy  Lymphadenopathy:      Upper Body:      Right upper body: No supraclavicular, axillary or pectoral adenopathy  Left upper body: No supraclavicular, axillary or pectoral adenopathy  Neurological:      Mental Status: She is alert and oriented to person, place, and time  Psychiatric:         Mood and Affect: Mood normal            Results:  Labs:      Imaging  10/17/2022 bilateral 3D screening mammogram was benign BI-RADS one with a density of one    I reviewed the above imaging data  Discussion/Summary:  81-year-old female who follows with me secondary to fibrocystic changes of the breast   There are no concerns on exam today  Her recent mammogram was benign  I will therefore see her again in one year or sooner should the need arise

## 2022-11-11 DIAGNOSIS — E78.00 PURE HYPERCHOLESTEROLEMIA: ICD-10-CM

## 2022-11-11 RX ORDER — SIMVASTATIN 20 MG
TABLET ORAL
Qty: 90 TABLET | Refills: 1 | Status: SHIPPED | OUTPATIENT
Start: 2022-11-11

## 2023-03-07 ENCOUNTER — APPOINTMENT (OUTPATIENT)
Dept: LAB | Facility: CLINIC | Age: 83
End: 2023-03-07

## 2023-03-07 LAB
ALBUMIN SERPL BCP-MCNC: 4 G/DL (ref 3.5–5)
ALP SERPL-CCNC: 80 U/L (ref 46–116)
ALT SERPL W P-5'-P-CCNC: 33 U/L (ref 12–78)
ANION GAP SERPL CALCULATED.3IONS-SCNC: 2 MMOL/L (ref 4–13)
AST SERPL W P-5'-P-CCNC: 29 U/L (ref 5–45)
BASOPHILS # BLD AUTO: 0.03 THOUSANDS/ÂΜL (ref 0–0.1)
BASOPHILS NFR BLD AUTO: 1 % (ref 0–1)
BILIRUB SERPL-MCNC: 0.69 MG/DL (ref 0.2–1)
BUN SERPL-MCNC: 13 MG/DL (ref 5–25)
CALCIUM SERPL-MCNC: 9.4 MG/DL (ref 8.3–10.1)
CHLORIDE SERPL-SCNC: 105 MMOL/L (ref 96–108)
CHOLEST SERPL-MCNC: 204 MG/DL
CO2 SERPL-SCNC: 31 MMOL/L (ref 21–32)
CREAT SERPL-MCNC: 0.74 MG/DL (ref 0.6–1.3)
EOSINOPHIL # BLD AUTO: 0.09 THOUSAND/ÂΜL (ref 0–0.61)
EOSINOPHIL NFR BLD AUTO: 2 % (ref 0–6)
ERYTHROCYTE [DISTWIDTH] IN BLOOD BY AUTOMATED COUNT: 13.6 % (ref 11.6–15.1)
GFR SERPL CREATININE-BSD FRML MDRD: 76 ML/MIN/1.73SQ M
GLUCOSE P FAST SERPL-MCNC: 92 MG/DL (ref 65–99)
HCT VFR BLD AUTO: 47.2 % (ref 34.8–46.1)
HDLC SERPL-MCNC: 82 MG/DL
HGB BLD-MCNC: 15.2 G/DL (ref 11.5–15.4)
IMM GRANULOCYTES # BLD AUTO: 0.01 THOUSAND/UL (ref 0–0.2)
IMM GRANULOCYTES NFR BLD AUTO: 0 % (ref 0–2)
LDLC SERPL CALC-MCNC: 99 MG/DL (ref 0–100)
LYMPHOCYTES # BLD AUTO: 1.87 THOUSANDS/ÂΜL (ref 0.6–4.47)
LYMPHOCYTES NFR BLD AUTO: 32 % (ref 14–44)
MCH RBC QN AUTO: 27.9 PG (ref 26.8–34.3)
MCHC RBC AUTO-ENTMCNC: 32.2 G/DL (ref 31.4–37.4)
MCV RBC AUTO: 87 FL (ref 82–98)
MONOCYTES # BLD AUTO: 0.56 THOUSAND/ÂΜL (ref 0.17–1.22)
MONOCYTES NFR BLD AUTO: 10 % (ref 4–12)
NEUTROPHILS # BLD AUTO: 3.26 THOUSANDS/ÂΜL (ref 1.85–7.62)
NEUTS SEG NFR BLD AUTO: 55 % (ref 43–75)
NONHDLC SERPL-MCNC: 122 MG/DL
NRBC BLD AUTO-RTO: 0 /100 WBCS
PLATELET # BLD AUTO: 225 THOUSANDS/UL (ref 149–390)
PMV BLD AUTO: 11.9 FL (ref 8.9–12.7)
POTASSIUM SERPL-SCNC: 4 MMOL/L (ref 3.5–5.3)
PROT SERPL-MCNC: 6.5 G/DL (ref 6.4–8.4)
RBC # BLD AUTO: 5.44 MILLION/UL (ref 3.81–5.12)
SODIUM SERPL-SCNC: 138 MMOL/L (ref 135–147)
TRIGL SERPL-MCNC: 115 MG/DL
WBC # BLD AUTO: 5.82 THOUSAND/UL (ref 4.31–10.16)

## 2023-03-08 ENCOUNTER — RA CDI HCC (OUTPATIENT)
Dept: OTHER | Facility: HOSPITAL | Age: 83
End: 2023-03-08

## 2023-03-14 NOTE — PROGRESS NOTES
Name: Cherylene Llanos      : 1940      MRN: [id number]  Encounter Provider: Gaby Lilly MD  Encounter Date: 3/15/2023   Encounter department: [facility]     1  Essential hypertension  -     hydrochlorothiazide (HYDRODIURIL) 25 mg tablet; Take 1 tablet (25 mg total) by mouth daily  -     potassium chloride (K-DUR,KLOR-CON) 10 mEq tablet; Take 1 tablet (10 mEq total) by mouth daily  -     amLODIPine (NORVASC) 5 mg tablet; Take 1 tablet (5 mg total) by mouth 2 (two) times a day  -     Comprehensive metabolic panel  -     CBC and differential    2  Pure hypercholesterolemia  -     amLODIPine (NORVASC) 5 mg tablet; Take 1 tablet (5 mg total) by mouth 2 (two) times a day  -     simvastatin (ZOCOR) 20 mg tablet; Take 1 tablet (20 mg total) by mouth daily  -     Lipid panel  -     TSH, 3rd generation with Free T4 reflex    3  Gastroesophageal reflux disease without esophagitis  -     famotidine (PEPCID) 20 mg tablet; Take 1 tablet (20 mg total) by mouth every morning    4  Hiatal hernia    Continue with current medications  Watch diet  OV 6 months with labs  Subjective     Follow up visit  Medications reviewed  Labs 2022 see note  Hypertension blood pressures have been stable on Amlodipine 5 mg daily, Valsartan 80 mg daily and HCTZ 25 mg daily  Creatinine 0 74  GFR 76  Electrolytes normal  K+ 4 0  on K+ supplement  Hgb 15 2  Hyperlipidemia on Simvastatin 20 mg/day- lipid profile cholesterol 204 increased from 200  Triglycerides 115 HDL 82  LDL 99  LFTs normal   FBS 92         Recent Results (from the past 840 hour(s))   Comprehensive metabolic panel    Collection Time: 23 11:00 AM   Result Value Ref Range    Sodium 138 135 - 147 mmol/L    Potassium 4 0 3 5 - 5 3 mmol/L    Chloride 105 96 - 108 mmol/L    CO2 31 21 - 32 mmol/L    ANION GAP 2 (L) 4 - 13 mmol/L    BUN 13 5 - 25 mg/dL    Creatinine 0 74 0 60 - 1 30 mg/dL    Glucose, Fasting 92 65 - 99 mg/dL Calcium 9 4 8 3 - 10 1 mg/dL    AST 29 5 - 45 U/L    ALT 33 12 - 78 U/L    Alkaline Phosphatase 80 46 - 116 U/L    Total Protein 6 5 6 4 - 8 4 g/dL    Albumin 4 0 3 5 - 5 0 g/dL    Total Bilirubin 0 69 0 20 - 1 00 mg/dL    eGFR 76 ml/min/1 73sq m   CBC and differential    Collection Time: 03/07/23 11:00 AM   Result Value Ref Range    WBC 5 82 4 31 - 10 16 Thousand/uL    RBC 5 44 (H) 3 81 - 5 12 Million/uL    Hemoglobin 15 2 11 5 - 15 4 g/dL    Hematocrit 47 2 (H) 34 8 - 46 1 %    MCV 87 82 - 98 fL    MCH 27 9 26 8 - 34 3 pg    MCHC 32 2 31 4 - 37 4 g/dL    RDW 13 6 11 6 - 15 1 %    MPV 11 9 8 9 - 12 7 fL    Platelets 969 138 - 197 Thousands/uL    nRBC 0 /100 WBCs    Neutrophils Relative 55 43 - 75 %    Immat GRANS % 0 0 - 2 %    Lymphocytes Relative 32 14 - 44 %    Monocytes Relative 10 4 - 12 %    Eosinophils Relative 2 0 - 6 %    Basophils Relative 1 0 - 1 %    Neutrophils Absolute 3 26 1 85 - 7 62 Thousands/µL    Immature Grans Absolute 0 01 0 00 - 0 20 Thousand/uL    Lymphocytes Absolute 1 87 0 60 - 4 47 Thousands/µL    Monocytes Absolute 0 56 0 17 - 1 22 Thousand/µL    Eosinophils Absolute 0 09 0 00 - 0 61 Thousand/µL    Basophils Absolute 0 03 0 00 - 0 10 Thousands/µL   Lipid panel    Collection Time: 03/07/23 11:00 AM   Result Value Ref Range    Cholesterol 204 (H) See Comment mg/dL    Triglycerides 115 See Comment mg/dL    HDL, Direct 82 >=50 mg/dL    LDL Calculated 99 0 - 100 mg/dL    Non-HDL-Chol (CHOL-HDL) 122 mg/dl             Review of Systems   Constitutional: Negative for appetite change, chills, fatigue, fever and unexpected weight change  HENT: Positive for hearing loss (bilateral hearing aids  )  Negative for congestion, ear pain, postnasal drip, rhinorrhea, sore throat and trouble swallowing  Prior ENT evaluation for recurrent sore throat LPR   Eyes: Negative for visual disturbance  Respiratory: Negative for cough, shortness of breath and wheezing      Cardiovascular: Negative for chest pain, palpitations and leg swelling  History of DVT left leg treated with Eliquis  No recurrence  No FH venous thrombosis  Gastrointestinal: Negative for abdominal pain, blood in stool, constipation, diarrhea, nausea and vomiting  GERD stable on Famotidine 20 mg daily  She uses  prn  Omeprazole 20 mg  No dysphagia  No history of Mauro's  04/2021 EGD Normal except for hiatal hernia  04/2021 colonoscopy normal except for mild diverticulosis sigmoid colon   Endocrine:        Lab Results       Component                Value               Date                       [id number]             1 550               03/18/2021               Genitourinary: Negative for difficulty urinating  Musculoskeletal: Negative for arthralgias and myalgias  Skin: Negative for rash  Neurological: Negative for dizziness and headaches  Hematological: Negative for adenopathy  Does not bruise/bleed easily  Psychiatric/Behavioral: Negative for dysphoric mood and sleep disturbance         Past Medical History:   Diagnosis Date   • Acid reflux    • [facility]   • Hearing loss    • Hypercholesterolemia    • Hypertension    • Impingement syndrome of left shoulder 7/12/2019   • Wears glasses      Past Surgical History:   Procedure Laterality Date   • BREAST BIOPSY Left     1995 left, 1997 unknown side   • COLONOSCOPY     • TONSILLECTOMY     • TUBAL LIGATION     • UPPER GASTROINTESTINAL ENDOSCOPY     • WISDOM TOOTH EXTRACTION  1962     Family History   Problem Relation Age of Onset   • Diabetes Mother    • Hypertension Mother    • Cancer Father    • Diabetes Father    • Hypertension Father    • Colon cancer Father 72   • Congenital heart disease Daughter    • Hyperlipidemia Other         pure   • Lymphoma Sister 72   • Lung cancer Sister 62   • Skin cancer Brother 72        Melanoma   • No Known Problems Maternal Grandmother    • No Known Problems Maternal Grandfather    • No Known Problems Paternal Grandmother    • No Known Problems Paternal Grandfather    • Prostate cancer Brother 72   • No Known Problems Daughter    • Breast cancer Other 54     Social History     Socioeconomic History   • Marital status: /Civil Union     Spouse name: None   • Number of children: None   • Years of education: None   • Highest education level: None   Occupational History   • None   Tobacco Use   • Smoking status: Never     Passive exposure: Never   • Smokeless tobacco: Never   Vaping Use   • Vaping Use: Never used   Substance and Sexual Activity   • Alcohol use: Yes     Comment: rare   • Drug use: No   • Sexual activity: Not Currently     Partners: Male     Birth control/protection: Post-menopausal, None   Other Topics Concern   • None   Social History Narrative   • None     Social Determinants of Health     Financial Resource Strain: Low Risk    • Difficulty of Paying Living Expenses: Not hard at all   Food Insecurity: Not on file   Transportation Needs: No Transportation Needs   • Lack of Transportation (Medical): No   • Lack of Transportation (Non-Medical):  No   Physical Activity: Not on file   Stress: Not on file   Social Connections: Not on file   Intimate Partner Violence: Not on file   Housing Stability: Not on file     Current Outpatient Medications on File Prior to Visit   Medication Sig   • Ascorbic Acid (VITAMIN C) 500 MG CAPS Take 1 capsule by mouth daily   • Calcium Carbonate-Vitamin D3 600-400 MG-UNIT TABS Take by mouth   • Cholecalciferol (VITAMIN D) 2000 units tablet Take by mouth daily   • folic acid (FOLVITE) 620 MCG tablet Take 1 tablet by mouth daily   • methocarbamol (ROBAXIN) 500 mg tablet Take 1 tablet (500 mg total) by mouth daily at bedtime as needed for muscle spasms   • ofloxacin (FLOXIN) 0 3 % otic solution 4 gtts to affected ear bid x 7 days   • omeprazole (PriLOSEC) 20 mg delayed release capsule Take 1 capsule (20 mg total) by mouth daily   • prednisoLONE acetate (PRED FORTE) 1 % ophthalmic suspension INSTILL 1 DROP IN THE RIGHT EYE THREE TIMES DAILY  • valsartan (DIOVAN) 80 mg tablet Take 1 tablet (80 mg total) by mouth daily   • [DISCONTINUED] amLODIPine (NORVASC) 5 mg tablet TAKE 1 TABLET BY MOUTH TWO TIMES DAILY   • [DISCONTINUED] famotidine (PEPCID) 20 mg tablet TAKE 1 TABLET BY MOUTH EVERY MORNING   • [DISCONTINUED] hydrochlorothiazide (HYDRODIURIL) 25 mg tablet TAKE 1 TABLET BY MOUTH EVERY DAY   • [DISCONTINUED] potassium chloride (K-DUR,KLOR-CON) 10 mEq tablet Take 1 tablet (10 mEq total) by mouth in the morning     • [DISCONTINUED] simvastatin (ZOCOR) 20 mg tablet TAKE 1 TABLET BY MOUTH EVERY DAY   • [DISCONTINUED] tobramycin (TOBREX) 0 3 % SOLN Administer 1 drop to the right eye 3 (three) times a day     Allergies   Allergen Reactions   • Sulfa Antibiotics Other (See Comments)     Unknown reaction    • Motrin [Ibuprofen] GI Intolerance     If on prescription strength, over the counter she does fine with     Immunization History   Administered Date(s) Administered   • COVID-19 PFIZER VACCINE 0 3 ML IM 02/28/2021, 03/21/2021   • Hep A, adult 10/05/2019, 10/21/2019   • INFLUENZA 09/26/2011, 09/17/2012, 10/21/2013, 10/03/2014, 10/08/2015, 10/18/2016, 09/19/2017, 10/09/2018, 09/26/2019   • Influenza, high dose seasonal 0 7 mL 10/28/2020, 11/03/2021   • Pneumococcal Conjugate 13-Valent 12/16/2015, 09/19/2017   • Pneumococcal Polysaccharide PPV23 10/17/2007   • Tdap 09/19/2019   • Zoster 03/18/2009   • Zoster Vaccine Recombinant 09/05/2019, 01/20/2020, 03/12/2020       Objective     /80 (BP Location: Right arm, Patient Position: Sitting, Cuff Size: Large)   Pulse 74   Temp (!) 97 °F (36 1 °C)   Ht 5' 4" (1 626 m)   Wt 91 6 kg (202 lb)   SpO2 97%   BMI 34 67 kg/m²      BP Readings from Last 3 Encounters:   03/15/23 132/80   11/03/22 134/80   08/24/22 128/62     Wt Readings from Last 3 Encounters:   03/15/23 91 6 kg (202 lb)   11/03/22 91 9 kg (202 lb 8 oz)   10/17/22 90 7 kg (200 lb)       Physical Exam  Vitals and nursing note reviewed  Constitutional:       General: She is not in acute distress  Appearance: She is well-developed  HENT:      Right Ear: External ear normal       Left Ear: External ear normal       Mouth/Throat:      Mouth: No oral lesions  Pharynx: Oropharynx is clear  No posterior oropharyngeal erythema  Eyes:      General: No scleral icterus  Extraocular Movements: Extraocular movements intact  Conjunctiva/sclera: Conjunctivae normal       Pupils: Pupils are equal, round, and reactive to light  Neck:      Thyroid: No thyroid mass or thyromegaly  Vascular: No carotid bruit or JVD  Trachea: No tracheal deviation  Cardiovascular:      Rate and Rhythm: Normal rate and regular rhythm  Heart sounds: Normal heart sounds  No murmur heard  No gallop  Pulmonary:      Effort: Pulmonary effort is normal  No respiratory distress  Breath sounds: Normal breath sounds  No wheezing or rales  Musculoskeletal:      Right lower leg: No edema  Left lower leg: No edema  Lymphadenopathy:      Cervical: No cervical adenopathy  Upper Body:      Right upper body: No supraclavicular adenopathy  Left upper body: No supraclavicular adenopathy  Skin:     Findings: No rash  Nails: There is no clubbing  Neurological:      General: No focal deficit present  Mental Status: She is alert and oriented to person, place, and time     Psychiatric:         Mood and Affect: Mood normal          Behavior: Behavior normal        Adali Lepe MD

## 2023-03-15 ENCOUNTER — OFFICE VISIT (OUTPATIENT)
Dept: FAMILY MEDICINE CLINIC | Facility: CLINIC | Age: 83
End: 2023-03-15

## 2023-03-15 VITALS
DIASTOLIC BLOOD PRESSURE: 80 MMHG | TEMPERATURE: 97 F | HEIGHT: 64 IN | SYSTOLIC BLOOD PRESSURE: 132 MMHG | HEART RATE: 74 BPM | OXYGEN SATURATION: 97 % | WEIGHT: 202 LBS | BODY MASS INDEX: 34.49 KG/M2

## 2023-03-15 DIAGNOSIS — I10 ESSENTIAL HYPERTENSION: Primary | ICD-10-CM

## 2023-03-15 DIAGNOSIS — E78.00 PURE HYPERCHOLESTEROLEMIA: ICD-10-CM

## 2023-03-15 DIAGNOSIS — K44.9 HIATAL HERNIA: ICD-10-CM

## 2023-03-15 DIAGNOSIS — K21.9 GASTROESOPHAGEAL REFLUX DISEASE WITHOUT ESOPHAGITIS: ICD-10-CM

## 2023-03-15 PROBLEM — Z12.31 SCREENING MAMMOGRAM, ENCOUNTER FOR: Status: RESOLVED | Noted: 2020-11-05 | Resolved: 2023-03-15

## 2023-03-15 RX ORDER — AMLODIPINE BESYLATE 5 MG/1
5 TABLET ORAL 2 TIMES DAILY
Qty: 180 TABLET | Refills: 3 | Status: SHIPPED | OUTPATIENT
Start: 2023-03-15

## 2023-03-15 RX ORDER — POTASSIUM CHLORIDE 750 MG/1
10 TABLET, EXTENDED RELEASE ORAL DAILY
Qty: 90 TABLET | Refills: 3 | Status: SHIPPED | OUTPATIENT
Start: 2023-03-15

## 2023-03-15 RX ORDER — SIMVASTATIN 20 MG
20 TABLET ORAL DAILY
Qty: 90 TABLET | Refills: 3 | Status: SHIPPED | OUTPATIENT
Start: 2023-03-15

## 2023-03-15 RX ORDER — HYDROCHLOROTHIAZIDE 25 MG/1
25 TABLET ORAL DAILY
Qty: 90 TABLET | Refills: 3 | Status: SHIPPED | OUTPATIENT
Start: 2023-03-15

## 2023-03-15 RX ORDER — FAMOTIDINE 20 MG/1
20 TABLET, FILM COATED ORAL EVERY MORNING
Qty: 90 TABLET | Refills: 3 | Status: SHIPPED | OUTPATIENT
Start: 2023-03-15

## 2023-05-08 NOTE — TELEPHONE ENCOUNTER
----- Message from Cr Love sent at 3/9/2020  8:45 AM EDT -----  Regarding: COLONOSCOPY  Contact: [phone]  03/09/20 8:49 AM    Hello, our patient Gavi Bey has had COLONOSCOPYcompleted/performed   Please assist in updating the patient chart by Luly Sevilla date of service is 03/31/2016    Thank you,  Cr GIRON [FreeTextEntry1] : Follow up HTN, HLD. [de-identified] : Patient is a 59yo female with PMH RA, HTN, HLD, prediabetes, anxiety who presents to the office for follow up.  \par \par HTN: pt currently taking Losartan 50mg daily. Pt does check BP at home, typically at goal. Pt admits to white coat syndrome, typically has elevated BP in doctor's offices however today's is well controlled. Pt denies HA, dizziness, vision changes, CP, SOB, LE edema.  \par \par HLD:  , total cholesterol 276,  in 02/2023.  ASCVD risk 3% at that time.  Lifestyle modifications encouraged. \par \par Pt suffering from seasonal allergies.  Had erythematous/itchy eyes last week, RX for olopatadine drops sent to pharmacy and pt reports complete resolution of symptoms.

## 2023-05-09 ENCOUNTER — APPOINTMENT (EMERGENCY)
Dept: CT IMAGING | Facility: HOSPITAL | Age: 83
End: 2023-05-09

## 2023-05-09 ENCOUNTER — HOSPITAL ENCOUNTER (OUTPATIENT)
Facility: HOSPITAL | Age: 83
Setting detail: OBSERVATION
Discharge: HOME | End: 2023-05-10
Attending: EMERGENCY MEDICINE | Admitting: INTERNAL MEDICINE

## 2023-05-09 ENCOUNTER — APPOINTMENT (EMERGENCY)
Dept: RADIOLOGY | Facility: HOSPITAL | Age: 83
End: 2023-05-09

## 2023-05-09 ENCOUNTER — APPOINTMENT (OUTPATIENT)
Dept: MRI IMAGING | Facility: HOSPITAL | Age: 83
End: 2023-05-09

## 2023-05-09 DIAGNOSIS — R11.2 NAUSEA AND VOMITING: ICD-10-CM

## 2023-05-09 DIAGNOSIS — R42 DIZZINESS: Primary | ICD-10-CM

## 2023-05-09 DIAGNOSIS — I10 ESSENTIAL HYPERTENSION: ICD-10-CM

## 2023-05-09 DIAGNOSIS — E87.6 HYPOKALEMIA: ICD-10-CM

## 2023-05-09 DIAGNOSIS — E78.00 PURE HYPERCHOLESTEROLEMIA: ICD-10-CM

## 2023-05-09 LAB
2HR DELTA HS TROPONIN: 1 NG/L
4HR DELTA HS TROPONIN: 3 NG/L
ALBUMIN SERPL BCP-MCNC: 4.1 G/DL (ref 3.5–5)
ALP SERPL-CCNC: 79 U/L (ref 34–104)
ALT SERPL W P-5'-P-CCNC: 15 U/L (ref 7–52)
ANION GAP SERPL CALCULATED.3IONS-SCNC: 13 MMOL/L (ref 4–13)
APTT PPP: 25 SECONDS (ref 23–37)
AST SERPL W P-5'-P-CCNC: 23 U/L (ref 13–39)
BASOPHILS # BLD AUTO: 0.03 THOUSANDS/ÂΜL (ref 0–0.1)
BASOPHILS NFR BLD AUTO: 1 % (ref 0–1)
BILIRUB SERPL-MCNC: 0.67 MG/DL (ref 0.2–1)
BUN SERPL-MCNC: 15 MG/DL (ref 5–25)
CALCIUM SERPL-MCNC: 9 MG/DL (ref 8.4–10.2)
CARDIAC TROPONIN I PNL SERPL HS: 2 NG/L
CARDIAC TROPONIN I PNL SERPL HS: 3 NG/L
CARDIAC TROPONIN I PNL SERPL HS: 5 NG/L
CHLORIDE SERPL-SCNC: 106 MMOL/L (ref 96–108)
CO2 SERPL-SCNC: 21 MMOL/L (ref 21–32)
CREAT SERPL-MCNC: 0.76 MG/DL (ref 0.6–1.3)
EOSINOPHIL # BLD AUTO: 0.03 THOUSAND/ÂΜL (ref 0–0.61)
EOSINOPHIL NFR BLD AUTO: 1 % (ref 0–6)
ERYTHROCYTE [DISTWIDTH] IN BLOOD BY AUTOMATED COUNT: 13.1 % (ref 11.6–15.1)
FLUAV RNA RESP QL NAA+PROBE: NEGATIVE
FLUBV RNA RESP QL NAA+PROBE: NEGATIVE
GFR SERPL CREATININE-BSD FRML MDRD: 74 ML/MIN/1.73SQ M
GLUCOSE SERPL-MCNC: 168 MG/DL (ref 65–140)
HCT VFR BLD AUTO: 46.9 % (ref 34.8–46.1)
HGB BLD-MCNC: 15.3 G/DL (ref 11.5–15.4)
IMM GRANULOCYTES # BLD AUTO: 0.03 THOUSAND/UL (ref 0–0.2)
IMM GRANULOCYTES NFR BLD AUTO: 1 % (ref 0–2)
INR PPP: 0.96 (ref 0.84–1.19)
LYMPHOCYTES # BLD AUTO: 1.13 THOUSANDS/ÂΜL (ref 0.6–4.47)
LYMPHOCYTES NFR BLD AUTO: 18 % (ref 14–44)
MCH RBC QN AUTO: 27.9 PG (ref 26.8–34.3)
MCHC RBC AUTO-ENTMCNC: 32.6 G/DL (ref 31.4–37.4)
MCV RBC AUTO: 85 FL (ref 82–98)
MONOCYTES # BLD AUTO: 0.23 THOUSAND/ÂΜL (ref 0.17–1.22)
MONOCYTES NFR BLD AUTO: 4 % (ref 4–12)
NEUTROPHILS # BLD AUTO: 4.96 THOUSANDS/ÂΜL (ref 1.85–7.62)
NEUTS SEG NFR BLD AUTO: 75 % (ref 43–75)
NRBC BLD AUTO-RTO: 0 /100 WBCS
PLATELET # BLD AUTO: 207 THOUSANDS/UL (ref 149–390)
PMV BLD AUTO: 11.3 FL (ref 8.9–12.7)
POTASSIUM SERPL-SCNC: 3.2 MMOL/L (ref 3.5–5.3)
PROT SERPL-MCNC: 6.7 G/DL (ref 6.4–8.4)
PROTHROMBIN TIME: 12.9 SECONDS (ref 11.6–14.5)
RBC # BLD AUTO: 5.49 MILLION/UL (ref 3.81–5.12)
RSV RNA RESP QL NAA+PROBE: NEGATIVE
SARS-COV-2 RNA RESP QL NAA+PROBE: NEGATIVE
SODIUM SERPL-SCNC: 140 MMOL/L (ref 135–147)
WBC # BLD AUTO: 6.41 THOUSAND/UL (ref 4.31–10.16)

## 2023-05-09 RX ORDER — MECLIZINE HYDROCHLORIDE 25 MG/1
25 TABLET ORAL EVERY 8 HOURS PRN
Status: DISCONTINUED | OUTPATIENT
Start: 2023-05-09 | End: 2023-05-10 | Stop reason: HOSPADM

## 2023-05-09 RX ORDER — ONDANSETRON 2 MG/ML
4 INJECTION INTRAMUSCULAR; INTRAVENOUS EVERY 6 HOURS PRN
Status: DISCONTINUED | OUTPATIENT
Start: 2023-05-09 | End: 2023-05-10 | Stop reason: HOSPADM

## 2023-05-09 RX ORDER — PRAVASTATIN SODIUM 40 MG
40 TABLET ORAL
Status: DISCONTINUED | OUTPATIENT
Start: 2023-05-09 | End: 2023-05-10 | Stop reason: HOSPADM

## 2023-05-09 RX ORDER — POTASSIUM CHLORIDE 14.9 MG/ML
20 INJECTION INTRAVENOUS ONCE
Status: COMPLETED | OUTPATIENT
Start: 2023-05-09 | End: 2023-05-09

## 2023-05-09 RX ORDER — ONDANSETRON 2 MG/ML
4 INJECTION INTRAMUSCULAR; INTRAVENOUS ONCE
Status: COMPLETED | OUTPATIENT
Start: 2023-05-09 | End: 2023-05-09

## 2023-05-09 RX ORDER — MECLIZINE HCL 12.5 MG/1
25 TABLET ORAL ONCE
Status: COMPLETED | OUTPATIENT
Start: 2023-05-09 | End: 2023-05-09

## 2023-05-09 RX ORDER — HYDROCHLOROTHIAZIDE 25 MG/1
25 TABLET ORAL DAILY
Status: DISCONTINUED | OUTPATIENT
Start: 2023-05-10 | End: 2023-05-10 | Stop reason: HOSPADM

## 2023-05-09 RX ORDER — ASPIRIN 81 MG/1
324 TABLET, CHEWABLE ORAL ONCE
Status: COMPLETED | OUTPATIENT
Start: 2023-05-09 | End: 2023-05-09

## 2023-05-09 RX ORDER — POTASSIUM CHLORIDE 750 MG/1
10 TABLET, EXTENDED RELEASE ORAL DAILY
Status: DISCONTINUED | OUTPATIENT
Start: 2023-05-10 | End: 2023-05-10 | Stop reason: HOSPADM

## 2023-05-09 RX ORDER — AMLODIPINE BESYLATE 5 MG/1
5 TABLET ORAL 2 TIMES DAILY
Status: DISCONTINUED | OUTPATIENT
Start: 2023-05-09 | End: 2023-05-10 | Stop reason: HOSPADM

## 2023-05-09 RX ORDER — ASPIRIN 81 MG/1
81 TABLET, CHEWABLE ORAL DAILY
Status: DISCONTINUED | OUTPATIENT
Start: 2023-05-10 | End: 2023-05-10

## 2023-05-09 RX ORDER — PANTOPRAZOLE SODIUM 40 MG/1
40 TABLET, DELAYED RELEASE ORAL
Status: DISCONTINUED | OUTPATIENT
Start: 2023-05-10 | End: 2023-05-10 | Stop reason: HOSPADM

## 2023-05-09 RX ORDER — ENOXAPARIN SODIUM 100 MG/ML
40 INJECTION SUBCUTANEOUS DAILY
Status: DISCONTINUED | OUTPATIENT
Start: 2023-05-10 | End: 2023-05-10 | Stop reason: HOSPADM

## 2023-05-09 RX ORDER — ACETAMINOPHEN 325 MG/1
650 TABLET ORAL EVERY 6 HOURS PRN
Status: DISCONTINUED | OUTPATIENT
Start: 2023-05-09 | End: 2023-05-10 | Stop reason: HOSPADM

## 2023-05-09 RX ORDER — LOSARTAN POTASSIUM 50 MG/1
50 TABLET ORAL DAILY
Status: DISCONTINUED | OUTPATIENT
Start: 2023-05-10 | End: 2023-05-10 | Stop reason: HOSPADM

## 2023-05-09 RX ADMIN — AMLODIPINE BESYLATE 5 MG: 5 TABLET ORAL at 20:13

## 2023-05-09 RX ADMIN — MECLIZINE 25 MG: 12.5 TABLET ORAL at 13:39

## 2023-05-09 RX ADMIN — SODIUM CHLORIDE 1000 ML: 0.9 INJECTION, SOLUTION INTRAVENOUS at 13:03

## 2023-05-09 RX ADMIN — POTASSIUM CHLORIDE 20 MEQ: 14.9 INJECTION, SOLUTION INTRAVENOUS at 13:30

## 2023-05-09 RX ADMIN — IOHEXOL 85 ML: 350 INJECTION, SOLUTION INTRAVENOUS at 12:53

## 2023-05-09 RX ADMIN — ONDANSETRON 4 MG: 2 INJECTION INTRAMUSCULAR; INTRAVENOUS at 13:12

## 2023-05-09 RX ADMIN — PRAVASTATIN SODIUM 40 MG: 40 TABLET ORAL at 19:06

## 2023-05-09 RX ADMIN — ASPIRIN 81 MG 324 MG: 81 TABLET ORAL at 13:39

## 2023-05-09 NOTE — ASSESSMENT & PLAN NOTE
Assessment:  Alysha Oliva is a [de-identified] y o  female with hx of htn and hld who presented as stroke alert on 5/9/2023 and LKW last night  Initial presenting deficits were vertigo  The patient has a pmhx significant for htn and hld  Does not take AP/AC agents  HINTS exam negative for central vertigo  Workup: In the ED, Presenting BP Blood Pressure: (!) 193/86  EKG on presentation to the ER showed sinus bradycardia  CT Head showed: no acute intracranial abnormality  CTA head and neck showed: No significant stenosis nor LVO    No results found for: HGBA1C  No results found for: TSH  Lab Results   Component Value Date    CHOLESTEROL 204 (H) 03/07/2023    TRIG 115 03/07/2023    HDL 82 03/07/2023    LDLCALC 99 03/07/2023       Risk factors: hypertension and hyperlipidemia    Pertinent Scores:  - NIHSS: 0    Impression: Positional nature of vertigo which only comes on when standing up or sitting up is most likely BPPV  No concerning findings on HINTS exam to suggest central etiology of dizziness  Low likelihood of posterior circulation stroke but will initiate workup for it to rule out such stroke  Plan:  - Recommend admit to hospital on acute ischemic stroke pathway  - MRI brain without contrast  - Continue Neuro checks - Every 1 hour x 4 hours, then every 2 hours x 4, then every 4 hours x 72 hours  - Permissive HTN for the first 24 hours up to   - Maintain glucose <180, SSI for coverage if indicated  - Repeat CTH if GCS drops 2pts in 1hr  - Fasting lipid panel & hemoglobin A1c  - PT/OT/ST/PM&R Evaluation  - Echocardiogram  - Atorvastatin 40 mg q d   - Ordered only aspirin as low likelihood of stroke    - Meclizine 25mg PRN for vertigo  - Continue monitoring on telemetry  - Rest of care per primary team none required

## 2023-05-09 NOTE — CONSULTS
Consultation - Stroke   Hortensia Solorio [de-identified] y o  female MRN: [id number]  Unit/Bed#: ED-03 Encounter: [id number]      Assessment/Plan     * Vertigo  Assessment & Plan  Assessment:  Hortensia Solorio is a [de-identified] y o  female with hx of htn and hld who presented as stroke alert on 5/9/2023 and LKW last night  Initial presenting deficits were vertigo  The patient has a pmhx significant for htn and hld  Does not take AP/AC agents  HINTS exam negative for central vertigo  Workup: In the ED, Presenting BP Blood Pressure: (!) 193/86  EKG on presentation to the ER showed sinus bradycardia  CT Head showed: no acute intracranial abnormality  CTA head and neck showed: No significant stenosis nor LVO    No results found for: HGBA1C  No results found for: TSH  Lab Results   Component Value Date    CHOLESTEROL 204 (H) 03/07/2023    TRIG 115 03/07/2023    HDL 82 03/07/2023    LDLCALC 99 03/07/2023       Risk factors: hypertension and hyperlipidemia    Pertinent Scores:  - NIHSS: 0    Impression: Positional nature of vertigo which only comes on when standing up or sitting up is most likely BPPV  No concerning findings on HINTS exam to suggest central etiology of dizziness  Low likelihood of posterior circulation stroke but will initiate workup for it to rule out such stroke  Plan:  - Recommend admit to hospital on acute ischemic stroke pathway  - MRI brain without contrast  - Continue Neuro checks - Every 1 hour x 4 hours, then every 2 hours x 4, then every 4 hours x 72 hours  - Permissive HTN for the first 24 hours up to   - Maintain glucose <180, SSI for coverage if indicated  - Repeat CTH if GCS drops 2pts in 1hr  - Fasting lipid panel & hemoglobin A1c  - PT/OT/ST/PM&R Evaluation  - Echocardiogram  - Atorvastatin 40 mg q d   - Ordered only aspirin as low likelihood of stroke    - Meclizine 25mg PRN for vertigo  - Continue monitoring on telemetry  - Rest of care per primary team        Thrombolytic Decision: Patient not a candidate  NIHSS of 0 and most likely peripheral vertigo      Recommendations for outpatient neurological follow up have yet to be determined  History of Present Illness     Reason for Consult / Principal Problem: Vertigo  Hx and PE limited by: None  Patient last known well: Last night  Stroke alert called: 12:32PM  Neurology time of arrival: 12:33PM  HPI: Usha Rater is a [de-identified] y o   female with hx of htn and hld who presents with dizziness in the morning  Patient states when she woke up in the morning after getting up from bed, she felt vertiginous and had to lay back in bed which completely resolved her symptoms  Then she attempted to sit or sat back up which caused her vertigo symptoms to result back again which caused her to come to the ED  She had nausea with the symptoms  She denies vertigo in the past but she stated there was a mention of change in vestibular crystal positioning in the past  She denies AP/AC usage  She denies numbness, weakness, visual symptoms, ambulatory dysfunction (except in setting of vertigo)  No history of strokes  Consult to Neurology  Consult performed by: Hussain Gonzales MD  Consult ordered by: MEMO Jean Baptiste          Review of Systems   Constitutional: Negative for chills and fever  HENT: Negative for ear pain and sore throat  Eyes: Negative for pain and visual disturbance  Respiratory: Negative for cough and shortness of breath  Cardiovascular: Negative for chest pain and palpitations  Gastrointestinal: Negative for abdominal pain and vomiting  Genitourinary: Negative for dysuria and hematuria  Musculoskeletal: Negative for arthralgias and back pain  Skin: Negative for color change and rash  Neurological: Positive for dizziness  Negative for seizures and syncope  All other systems reviewed and are negative        Historical Information   Past Medical History:   Diagnosis Date   • Acid reflux    • [facility]   • Hearing loss    • Hypercholesterolemia    • Hypertension    • Impingement syndrome of left shoulder 7/12/2019   • Wears glasses      Past Surgical History:   Procedure Laterality Date   • BREAST BIOPSY Left     1995 left, 1997 unknown side   • COLONOSCOPY     • TONSILLECTOMY     • TUBAL LIGATION     • UPPER GASTROINTESTINAL ENDOSCOPY     • WISDOM TOOTH EXTRACTION  1962     Social History   Social History     Substance and Sexual Activity   Alcohol Use Yes    Comment: rare     Social History     Substance and Sexual Activity   Drug Use No     E-Cigarette/Vaping   • E-Cigarette Use Never User      E-Cigarette/Vaping Substances   • Nicotine No    • THC No    • CBD No    • Flavoring No    • Other No    • Unknown No      Social History     Tobacco Use   Smoking Status Never   • Passive exposure: Never   Smokeless Tobacco Never     Family History:   Family History   Problem Relation Age of Onset   • Diabetes Mother    • Hypertension Mother    • Cancer Father    • Diabetes Father    • Hypertension Father    • Colon cancer Father 72   • Congenital heart disease Daughter    • Hyperlipidemia Other         pure   • Lymphoma Sister 72   • Lung cancer Sister 62   • Skin cancer Brother 72        Melanoma   • No Known Problems Maternal Grandmother    • No Known Problems Maternal Grandfather    • No Known Problems Paternal Grandmother    • No Known Problems Paternal Grandfather    • Prostate cancer Brother 72   • No Known Problems Daughter    • Breast cancer Other 54       Review of previous medical records was  completed  Meds/Allergies   current meds:   No current facility-administered medications for this encounter  and PTA meds:   Prior to Admission Medications   Prescriptions Last Dose Informant Patient Reported? Taking?    Ascorbic Acid (VITAMIN C) 500 MG CAPS 5/8/2023  Yes Yes   Sig: Take 1 capsule by mouth daily   Calcium Carbonate-Vitamin D3 600-400 MG-UNIT TABS 5/8/2023  Yes Yes   Sig: Take by mouth   Cholecalciferol "(VITAMIN D) 2000 units tablet 2023  Yes Yes   Sig: Take by mouth daily   amLODIPine (NORVASC) 5 mg tablet 2023  No Yes   Sig: Take 1 tablet (5 mg total) by mouth 2 (two) times a day   famotidine (PEPCID) 20 mg tablet 2023  No Yes   Sig: Take 1 tablet (20 mg total) by mouth every morning   folic acid (FOLVITE) 102 MCG tablet 2023  Yes Yes   Sig: Take 1 tablet by mouth daily   hydrochlorothiazide (HYDRODIURIL) 25 mg tablet 2023  No Yes   Sig: Take 1 tablet (25 mg total) by mouth daily   methocarbamol (ROBAXIN) 500 mg tablet More than a month  No No   Sig: Take 1 tablet (500 mg total) by mouth daily at bedtime as needed for muscle spasms   ofloxacin (FLOXIN) 0 3 % otic solution More than a month  No No   Si gtts to affected ear bid x 7 days   omeprazole (PriLOSEC) 20 mg delayed release capsule 2023  No Yes   Sig: Take 1 capsule (20 mg total) by mouth daily   potassium chloride (K-DUR,KLOR-CON) 10 mEq tablet 2023  No Yes   Sig: Take 1 tablet (10 mEq total) by mouth daily   prednisoLONE acetate (PRED FORTE) 1 % ophthalmic suspension Past Week  Yes Yes   Sig: INSTILL 1 DROP IN THE RIGHT EYE THREE TIMES DAILY  simvastatin (ZOCOR) 20 mg tablet 2023  No Yes   Sig: Take 1 tablet (20 mg total) by mouth daily   valsartan (DIOVAN) 80 mg tablet 2023  No Yes   Sig: Take 1 tablet (80 mg total) by mouth daily      Facility-Administered Medications: None       Allergies   Allergen Reactions   • Sulfa Antibiotics Other (See Comments)     Unknown reaction    • Motrin [Ibuprofen] GI Intolerance     If on prescription strength, over the counter she does fine with       Objective   Vitals:Blood pressure 152/68, pulse 57, temperature 97 5 °F (36 4 °C), temperature source Oral, resp  rate 16, height 5' 4\" (1 626 m), weight 95 4 kg (210 lb 5 1 oz), SpO2 98 %, not currently breastfeeding  ,Body mass index is 36 1 kg/m²      Intake/Output Summary (Last 24 hours) at 2023 3173  Last data filed at " 5/9/2023 1546  Gross per 24 hour   Intake 1050 ml   Output --   Net 1050 ml       Invasive Devices: Invasive Devices     Peripheral Intravenous Line  Duration           Peripheral IV 05/09/23 Left;Proximal;Ventral (anterior) Forearm <1 day                Physical Exam  Eyes:      Extraocular Movements: EOM normal       Pupils: Pupils are equal, round, and reactive to light  Neurological:      Mental Status: She is oriented to person, place, and time  Motor: Motor strength is normal       Coordination: Finger-Nose-Finger Test and Heel to NIX [facility] Test normal       Deep Tendon Reflexes:      Reflex Scores:       Bicep reflexes are 2+ on the right side and 2+ on the left side  Brachioradialis reflexes are 2+ on the right side and 2+ on the left side  Patellar reflexes are 2+ on the right side and 2+ on the left side  Achilles reflexes are 2+ on the right side and 2+ on the left side  Psychiatric:         Speech: Speech normal        Neurologic Exam     Mental Status   Oriented to person, place, and time  Attention: normal  Concentration: normal    Speech: speech is normal   Level of consciousness: alert  Knowledge: good  Able to name object  Able to read  Able to repeat  Cranial Nerves     CN II   Visual fields full to confrontation  CN III, IV, VI   Pupils are equal, round, and reactive to light  Extraocular motions are normal      CN V   Facial sensation intact  CN VII   Facial expression full, symmetric  CN XI   CN XI normal      CN XII   CN XII normal    HINTS exam showed nonsustained nystagmus, no vertical skew, no corrective saccades  Jimmye Spatz deferred as patient unable to tolerate due to vertigo     Motor Exam   Muscle bulk: normal  Overall muscle tone: normal    Strength   Strength 5/5 throughout       Sensory Exam   Light touch normal      Gait, Coordination, and Reflexes     Coordination   Finger to nose coordination: normal  Heel to shin coordination: normal    Reflexes   Right brachioradialis: 2+  Left brachioradialis: 2+  Right biceps: 2+  Left biceps: 2+  Right patellar: 2+  Left patellar: 2+  Right achilles: 2+  Left achilles: 2+  Right plantar: normal  Left plantar: normalGait exam deferred due to concerning dizziness       NIHSS:  1a Level of Consciousness: 0 = Alert   1b  LOC Questions: 0 = Answers both correctly   1c  LOC Commands: 0 = Obeys both correctly   2  Best Gaze: 0 = Normal   3  Visual: 0 = No visual field loss   4  Facial Palsy: 0=Normal symmetric movement   5a  Motor Right Arm: 0=No drift, limb holds 90 (or 45) degrees for full 10 seconds   5b  Motor Left Arm: 0=No drift, limb holds 90 (or 45) degrees for full 10 seconds   6a  Motor Right Le=No drift, limb holds 90 (or 45) degrees for full 10 seconds   6b  Motor Left Le=No drift, limb holds 90 (or 45) degrees for full 10 seconds   7  Limb Ataxia:  0=Absent   8  Sensory: 0=Normal; no sensory loss   9  Best Language:  0=No aphasia, normal   10  Dysarthria: 0=Normal articulation   11   Extinction and Inattention (formerly Neglect): 0=No abnormality   Total Score: 0     Time NIHSS was completed: 12:45PM    Modified Sim Score:  0 (No baseline symptoms/disability)    Lab Results:   CBC:   Results from last 7 days   Lab Units 23  1136   WBC Thousand/uL 6 41   RBC Million/uL 5 49*   HEMOGLOBIN g/dL 15 3   HEMATOCRIT % 46 9*   MCV fL 85   PLATELETS Thousands/uL 207   , BMP/CMP:   Results from last 7 days   Lab Units 23  1136   SODIUM mmol/L 140   POTASSIUM mmol/L 3 2*   CHLORIDE mmol/L 106   CO2 mmol/L 21   BUN mg/dL 15   CREATININE mg/dL 0 76   CALCIUM mg/dL 9 0   AST U/L 23   ALT U/L 15   ALK PHOS U/L 79   EGFR ml/min/1 73sq m 74   , Vitamin B12:   , HgBA1C:   , TSH:   , Coagulation:   Results from last 7 days   Lab Units 23  1310   INR  0 96   , Lipid Profile:   , Ammonia:   , Urinalysis:       Invalid input(s): URIBILINOGEN, Drug Screen:   , Medication Drug Levels: Invalid input(s): CARBAMAZEPINE,  PHENOBARB, LACOSAMIDE, OXCARBAZEPINE  Imaging Studies: I have personally reviewed pertinent films in PACS  EKG, Pathology, and Other Studies: I have personally reviewed pertinent reports  VTE Prophylaxis: Per primary    Code Status: No Order  Advance Directive and Living Will:      Power of :    POLST:      Counseling / Coordination of Care  Total time spent today 40 minutes  Greater than 50% of total time was spent with the patient and / or family counseling and / or coordination of care   A description of the counseling / coordination of care: Discussed low likelihood of stroke and more likely BPPV but will do stroke workup for possible small stroke

## 2023-05-09 NOTE — ASSESSMENT & PLAN NOTE
• Presents with a days worth of dizziness, worse with positional changes  History of BPPV and current presentation is suspicious for same  She had improvement with meclizine and Zofran in the ER  Neurology was consulted in the ER and recommended stroke pathway  • CT: No acute abnormality  • CTA: No large vessel occlusion or disease noted  • Stroke Pathway  o Frequent vital signs  o Neuro checks  o Telemetry  o Check lipid panel and A1c   o Aspirin and statin ordered  • Obtain MRI brain  • Check TTE  • Start meclizine  • Consult Neurology  • PT/OT consultation

## 2023-05-09 NOTE — H&P
[facility]  H&P  Name: Blayne Fish [de-identified] y o  female I MRN: [id number]  Unit/Bed#: ED-03 I Date of Admission: 5/9/2023   Date of Service: 5/9/2023 I Hospital Day: 0      Assessment/Plan   * Dizziness  Assessment & Plan  • Presents with a days worth of dizziness, worse with positional changes  History of BPPV and current presentation is suspicious for same  She had improvement with meclizine and Zofran in the ER  Neurology was consulted in the ER and recommended stroke pathway  • CT: No acute abnormality  • CTA: No large vessel occlusion or disease noted  • Stroke Pathway  o Frequent vital signs  o Neuro checks  o Telemetry  o Check lipid panel and A1c   o Aspirin and statin ordered  • Obtain MRI brain  • Check TTE  • Start meclizine  • Consult Neurology  • PT/OT consultation  Hyperlipidemia  Assessment & Plan  · Rechecking fasting lipid panel  · Continue statin    Essential hypertension  Assessment & Plan  · Blood pressure 150s in the ER  · Resume home antihypertensive regimen  · Low suspicion for CVA, goal for normotension  History of DVT of lower extremity  Assessment & Plan  · No longer on Eliquis        VTE Pharmacologic Prophylaxis: VTE Score: 4 Moderate Risk (Score 3-4) - Pharmacological DVT Prophylaxis Ordered: enoxaparin (Lovenox)  Code Status: full code  Discussion with family: Updated  () at bedside  Anticipated Length of Stay: Patient will be admitted on an observation basis with an anticipated length of stay of less than 2 midnights secondary to dizziness suspicious for BPPV with ambulatory issues  Total Time for Visit, including Counseling / Coordination of Care: 75 minutes Greater than 50% of this total time spent on direct patient counseling and coordination of care      Chief Complaint: dizziness    History of Present Illness:  Blayne Fish is a [de-identified] y o  female with a PMH of BPPV, hyperlipidemia who presents with onset of dizziness this morning  Patient has history of BPPV approximately 3 years ago and completed physical therapy and follows with a nose and throat doctor  Reports that when she was trying to get up from bed today she had severe dizziness  It is worsened with positional changes  No dizziness at rest   Denied headache or lightheadedness  No falls  Had some nausea  Review of Systems:  Review of Systems   Constitutional: Negative for activity change, appetite change, chills, fatigue and fever  HENT: Negative for congestion, rhinorrhea, sinus pressure and sore throat  Eyes: Negative for photophobia, pain and visual disturbance  Respiratory: Negative for cough, shortness of breath and wheezing  Cardiovascular: Negative for chest pain, palpitations and leg swelling  Gastrointestinal: Positive for nausea  Negative for abdominal distention, abdominal pain, constipation, diarrhea and vomiting  Endocrine: Negative for cold intolerance, heat intolerance, polydipsia and polyuria  Genitourinary: Negative for difficulty urinating, dysuria, flank pain, frequency and hematuria  Musculoskeletal: Negative for arthralgias, back pain and joint swelling  Skin: Negative for color change, pallor and rash  Allergic/Immunologic: Negative  Neurological: Positive for dizziness  Negative for syncope, weakness, light-headedness and headaches  Hematological: Negative  Psychiatric/Behavioral: Negative          Past Medical and Surgical History:   Past Medical History:   Diagnosis Date   • Acid reflux    [facility]   • Hearing loss    • Hypercholesterolemia    • Hypertension    • Impingement syndrome of left shoulder 7/12/2019   • Wears glasses        Past Surgical History:   Procedure Laterality Date   • BREAST BIOPSY Left     1995 left, 1997 unknown side   • COLONOSCOPY     • TONSILLECTOMY     • TUBAL LIGATION     • UPPER GASTROINTESTINAL ENDOSCOPY     • WISDOM TOOTH EXTRACTION 1962       Meds/Allergies:  Prior to Admission medications    Medication Sig Start Date End Date Taking? Authorizing Provider   amLODIPine (NORVASC) 5 mg tablet Take 1 tablet (5 mg total) by mouth 2 (two) times a day 3/15/23  Yes Laurian Schilder, MD   Ascorbic Acid (VITAMIN C) 500 MG CAPS Take 1 capsule by mouth daily 12/9/13  Yes Historical Provider, MD   Calcium Carbonate-Vitamin D3 600-400 MG-UNIT TABS Take by mouth 12/9/13  Yes Historical Provider, MD   Cholecalciferol (VITAMIN D) 2000 units tablet Take by mouth daily 12/9/13  Yes Historical Provider, MD   famotidine (PEPCID) 20 mg tablet Take 1 tablet (20 mg total) by mouth every morning 3/15/23  Yes Laurian Schilder, MD   folic acid (FOLVITE) 268 MCG tablet Take 1 tablet by mouth daily 11/22/17  Yes Historical Provider, MD   hydrochlorothiazide (HYDRODIURIL) 25 mg tablet Take 1 tablet (25 mg total) by mouth daily 3/15/23  Yes Laurian Schilder, MD   omeprazole (PriLOSEC) 20 mg delayed release capsule Take 1 capsule (20 mg total) by mouth daily 8/15/22  Yes Laurian Schilder, MD   potassium chloride (K-DUR,KLOR-CON) 10 mEq tablet Take 1 tablet (10 mEq total) by mouth daily 3/15/23  Yes Laurian Schilder, MD   simvastatin (ZOCOR) 20 mg tablet Take 1 tablet (20 mg total) by mouth daily 3/15/23  Yes Laurian Schilder, MD   valsartan (DIOVAN) 80 mg tablet Take 1 tablet (80 mg total) by mouth daily 8/15/22  Yes Laurian Schilder, MD   methocarbamol (ROBAXIN) 500 mg tablet Take 1 tablet (500 mg total) by mouth daily at bedtime as needed for muscle spasms 8/10/22   Maddy Hernandez MD   ofloxacin (FLOXIN) 0 3 % otic solution 4 gtts to affected ear bid x 7 days 4/16/21   Earnestine Stewart PA-C   prednisoLONE acetate (PRED FORTE) 1 % ophthalmic suspension INSTILL 1 DROP IN THE RIGHT EYE THREE TIMES DAILY  9/8/22   Historical Provider, MD     I have reviewed home medications with patient personally  Allergies:    Allergies   Allergen Reactions   • Sulfa Antibiotics Other (See Comments)     Unknown reaction    • Motrin [Ibuprofen] GI Intolerance     If on prescription strength, over the counter she does fine with       Social History:  Marital Status: /Civil Union   Occupation: retired  Patient Pre-hospital Living Situation: Home, With spouse  Patient Pre-hospital Level of Mobility: walks  Patient Pre-hospital Diet Restrictions: none  Substance Use History:   Social History     Substance and Sexual Activity   Alcohol Use Yes    Comment: rare     Social History     Tobacco Use   Smoking Status Never   • Passive exposure: Never   Smokeless Tobacco Never     Social History     Substance and Sexual Activity   Drug Use No       Family History:  Family History   Problem Relation Age of Onset   • Diabetes Mother    • Hypertension Mother    • Cancer Father    • Diabetes Father    • Hypertension Father    • Colon cancer Father 72   • Congenital heart disease Daughter    • Hyperlipidemia Other         pure   • Lymphoma Sister 72   • Lung cancer Sister 62   • Skin cancer Brother 72        Melanoma   • No Known Problems Maternal Grandmother    • No Known Problems Maternal Grandfather    • No Known Problems Paternal Grandmother    • No Known Problems Paternal Grandfather    • Prostate cancer Brother 72   • No Known Problems Daughter    • Breast cancer Other 54       Physical Exam:     Vitals:   Blood Pressure: 155/68 (05/09/23 1430)  Pulse: (!) 54 (05/09/23 1430)  Temperature: 97 5 °F (36 4 °C) (05/09/23 1124)  Temp Source: Oral (05/09/23 1124)  Respirations: 16 (05/09/23 1430)  Weight - Scale: 94 8 kg (208 lb 15 9 oz) (05/09/23 1314)  SpO2: 100 % (05/09/23 1430)    Physical Exam  Vitals and nursing note reviewed  Constitutional:       General: She is not in acute distress  Appearance: Normal appearance  HENT:      Head: Normocephalic and atraumatic  Mouth/Throat:      Mouth: Mucous membranes are moist    Eyes:      General: No scleral icterus       Extraocular Movements: Extraocular movements intact  Pupils: Pupils are equal, round, and reactive to light  Cardiovascular:      Rate and Rhythm: Normal rate and regular rhythm  Heart sounds: Normal heart sounds  No murmur heard  No friction rub  Pulmonary:      Effort: Pulmonary effort is normal       Breath sounds: Normal breath sounds  No wheezing or rhonchi  Abdominal:      General: Bowel sounds are normal  There is no distension  Palpations: Abdomen is soft  Tenderness: There is no abdominal tenderness  There is no guarding  Musculoskeletal:         General: No swelling  Cervical back: Neck supple  Right lower leg: No edema  Left lower leg: No edema  Skin:     General: Skin is warm and dry  Capillary Refill: Capillary refill takes less than 2 seconds  Coloration: Skin is not jaundiced or pale  Neurological:      General: No focal deficit present  Mental Status: She is alert and oriented to person, place, and time  Mental status is at baseline  Additional Data:     Lab Results:  Results from last 7 days   Lab Units 05/09/23  1136   WBC Thousand/uL 6 41   HEMOGLOBIN g/dL 15 3   HEMATOCRIT % 46 9*   PLATELETS Thousands/uL 207   NEUTROS PCT % 75   LYMPHS PCT % 18   MONOS PCT % 4   EOS PCT % 1     Results from last 7 days   Lab Units 05/09/23  1136   SODIUM mmol/L 140   POTASSIUM mmol/L 3 2*   CHLORIDE mmol/L 106   CO2 mmol/L 21   BUN mg/dL 15   CREATININE mg/dL 0 76   ANION GAP mmol/L 13   CALCIUM mg/dL 9 0   ALBUMIN g/dL 4 1   TOTAL BILIRUBIN mg/dL 0 67   ALK PHOS U/L 79   ALT U/L 15   AST U/L 23   GLUCOSE RANDOM mg/dL 168*     Results from last 7 days   Lab Units 05/09/23  1310   INR  0 96                   Imaging: Reviewed radiology reports from this admission including: CT head  CTA stroke alert (head/neck)   Final Result by E Vinson Nyhan, MD (05/09 1312)      No significant stenosis of the cervical carotid or vertebral arteries     No significant intracranial stenosis, large vessel occlusion or aneurysm  Findings were directly discussed with Alejandro Haynes at 1:09 p m  Workstation performed: [id number]         CT stroke alert brain   Final Result by JOHANA Ureña MD (05/09 1313)      No acute intracranial abnormality  Findings were directly discussed with Alejandro Haynes at 1:09 p m  Workstation performed: [id number]         XR chest 1 view portable    (Results Pending)       EKG and Other Studies Reviewed on Admission:   · Prior pertinent studies and records reviewed in [facility]  ** Please Note: This note has been constructed using a voice recognition system   **

## 2023-05-09 NOTE — ED PROVIDER NOTES
"History  Chief Complaint   Patient presents with   • Dizziness     PT reports \"waking up this morning and the whole room was spinning\"     Patient is an [de-identified] yo female with a pmhx of htn, dvt arriving for evaluation of dizziness  Patient states she went to be last night and felt without any issues or problems  Patient states that this morning she woke up at 5 AM and when she sat up to use the bathroom she had dizziness and nausea  Patient denies headache at that time  Patient states that she prefers to be sitting up as when she lays flat and then changes position to sit up it illicit's symptoms  Patient states that while sitting, dizziness is improved  Patient has no focal neuro deficits  Patient reports when she is laid flat does not have symptoms, however when she is sat up she become dizzy  Patient denies history of vertigo  Patient denies recent sinus infections  Patient denies blurred or double vision  Patient states when the car would turn on the way to the ER it would make her symptoms worse  Patient denies syncope, near syncope, chest pain, shortness of breath  Patient denies recent head trauma  Patient states not on AC/AP  Prior to Admission Medications   Prescriptions Last Dose Informant Patient Reported? Taking?    Ascorbic Acid (VITAMIN C) 500 MG CAPS   Yes No   Sig: Take 1 capsule by mouth daily   Calcium Carbonate-Vitamin D3 600-400 MG-UNIT TABS   Yes No   Sig: Take by mouth   Cholecalciferol (VITAMIN D) 2000 units tablet   Yes No   Sig: Take by mouth daily   amLODIPine (NORVASC) 5 mg tablet   No No   Sig: Take 1 tablet (5 mg total) by mouth 2 (two) times a day   famotidine (PEPCID) 20 mg tablet   No No   Sig: Take 1 tablet (20 mg total) by mouth every morning   folic acid (FOLVITE) 894 MCG tablet   Yes No   Sig: Take 1 tablet by mouth daily   hydrochlorothiazide (HYDRODIURIL) 25 mg tablet   No No   Sig: Take 1 tablet (25 mg total) by mouth daily   methocarbamol (ROBAXIN) 500 mg tablet   No No " Sig: Take 1 tablet (500 mg total) by mouth daily at bedtime as needed for muscle spasms   ofloxacin (FLOXIN) 0 3 % otic solution   No No   Si gtts to affected ear bid x 7 days   omeprazole (PriLOSEC) 20 mg delayed release capsule   No No   Sig: Take 1 capsule (20 mg total) by mouth daily   potassium chloride (K-DUR,KLOR-CON) 10 mEq tablet   No No   Sig: Take 1 tablet (10 mEq total) by mouth daily   prednisoLONE acetate (PRED FORTE) 1 % ophthalmic suspension   Yes No   Sig: INSTILL 1 DROP IN THE RIGHT EYE THREE TIMES DAILY     simvastatin (ZOCOR) 20 mg tablet   No No   Sig: Take 1 tablet (20 mg total) by mouth daily   valsartan (DIOVAN) 80 mg tablet   No No   Sig: Take 1 tablet (80 mg total) by mouth daily      Facility-Administered Medications: None       Past Medical History:   Diagnosis Date   • Acid reflux    • [facility] 2018   • Hearing loss    • Hypercholesterolemia    • Hypertension    • Impingement syndrome of left shoulder 2019   • Wears glasses        Past Surgical History:   Procedure Laterality Date   • BREAST BIOPSY Left      left,  unknown side   • COLONOSCOPY     • TONSILLECTOMY     • TUBAL LIGATION     • UPPER GASTROINTESTINAL ENDOSCOPY     • WISDOM TOOTH EXTRACTION         Family History   Problem Relation Age of Onset   • Diabetes Mother    • Hypertension Mother    • Cancer Father    • Diabetes Father    • Hypertension Father    • Colon cancer Father 72   • Congenital heart disease Daughter    • Hyperlipidemia Other         pure   • Lymphoma Sister 72   • Lung cancer Sister 62   • Skin cancer Brother 72        Melanoma   • No Known Problems Maternal Grandmother    • No Known Problems Maternal Grandfather    • No Known Problems Paternal Grandmother    • No Known Problems Paternal Grandfather    • Prostate cancer Brother 72   • No Known Problems Daughter    • Breast cancer Other 54     I have reviewed and agree with the history as documented  E-Cigarette/Vaping   • E-Cigarette Use Never User      E-Cigarette/Vaping Substances   • Nicotine No    • THC No    • CBD No    • Flavoring No    • Other No    • Unknown No      Social History     Tobacco Use   • Smoking status: Never     Passive exposure: Never   • Smokeless tobacco: Never   Vaping Use   • Vaping Use: Never used   Substance Use Topics   • Alcohol use: Yes     Comment: rare   • Drug use: No       Review of Systems   Constitutional: Negative  HENT: Negative  Eyes: Negative  Respiratory: Negative  Cardiovascular: Negative  Gastrointestinal: Negative  Endocrine: Negative  Genitourinary: Negative  Musculoskeletal: Negative  Skin: Negative  Allergic/Immunologic: Negative  Neurological: Positive for dizziness  Negative for tremors, seizures, syncope, facial asymmetry, speech difficulty, weakness, light-headedness, numbness and headaches  Hematological: Negative  Psychiatric/Behavioral: Negative  All other systems reviewed and are negative  Physical Exam  Physical Exam  Vitals and nursing note reviewed  Constitutional:       General: She is not in acute distress  Appearance: Normal appearance  She is normal weight  She is not ill-appearing or toxic-appearing  HENT:      Head: Normocephalic  Right Ear: External ear normal       Left Ear: External ear normal       Nose: Nose normal       Mouth/Throat:      Mouth: Mucous membranes are moist    Eyes:      General: No visual field deficit  Extraocular Movements: Extraocular movements intact  Pupils: Pupils are equal, round, and reactive to light  Cardiovascular:      Rate and Rhythm: Normal rate and regular rhythm  Pulses: Normal pulses  Heart sounds: Normal heart sounds  Pulmonary:      Effort: Pulmonary effort is normal       Breath sounds: Normal breath sounds  Abdominal:      General: Abdomen is flat  Bowel sounds are normal       Palpations: Abdomen is soft  Musculoskeletal:         General: No swelling or deformity  Normal range of motion  Cervical back: Normal range of motion and neck supple  Skin:     General: Skin is warm  Capillary Refill: Capillary refill takes less than 2 seconds  Neurological:      General: No focal deficit present  Mental Status: She is alert and oriented to person, place, and time  Mental status is at baseline  GCS: GCS eye subscore is 4  GCS verbal subscore is 5  GCS motor subscore is 6  Cranial Nerves: Cranial nerves 2-12 are intact  No cranial nerve deficit, dysarthria or facial asymmetry  Sensory: Sensation is intact  No sensory deficit  Motor: Motor function is intact  No weakness, tremor, atrophy, abnormal muscle tone, seizure activity or pronator drift  Coordination: Coordination is intact  Comments: 5/5 upper extremity strength, no numbness or tingling   5/5 lower extremity strength, no numbness or tingling    Due to dizziness patient reports cannot stand up and participate in gait exam    Psychiatric:         Mood and Affect: Mood normal          Behavior: Behavior normal          Thought Content:  Thought content normal          Vital Signs  ED Triage Vitals [05/09/23 1124]   Temperature Pulse Respirations Blood Pressure SpO2   97 5 °F (36 4 °C) 58 16 (!) 193/86 100 %      Temp Source Heart Rate Source Patient Position - Orthostatic VS BP Location FiO2 (%)   Oral Monitor Sitting Right arm --      Pain Score       --           Vitals:    05/09/23 1124 05/09/23 1200   BP: (!) 193/86 (!) 174/78   Pulse: 58 59   Patient Position - Orthostatic VS: Sitting          Visual Acuity      ED Medications  Medications   potassium chloride 20 mEq IVPB (premix) (20 mEq Intravenous New Bag 5/9/23 1330)   sodium chloride 0 9 % bolus 1,000 mL (1,000 mL Intravenous New Bag 5/9/23 1303)   ondansetron (ZOFRAN) injection 4 mg (4 mg Intravenous Given 5/9/23 1312)   iohexol (OMNIPAQUE) 350 MG/ML injection (SINGLE-DOSE) 100 mL (85 mL Intravenous Given 5/9/23 1253)   aspirin chewable tablet 324 mg (324 mg Oral Given 5/9/23 1339)   meclizine (ANTIVERT) tablet 25 mg (25 mg Oral Given 5/9/23 1339)       Diagnostic Studies  Results Reviewed     Procedure Component Value Units Date/Time    HS Troponin I 2hr [[id number]]  (Normal) Collected: 05/09/23 1310    Lab Status: Final result Specimen: Blood from Arm, Right Updated: 05/09/23 1354     hs TnI 2hr 3 ng/L      Delta 2hr hsTnI 1 ng/L     Protime-INR [[id number]]  (Normal) Collected: 05/09/23 1310    Lab Status: Final result Specimen: Blood from Arm, Right Updated: 05/09/23 1339     Protime 12 9 seconds      INR 0 96    APTT [[id number]]  (Normal) Collected: 05/09/23 1310    Lab Status: Final result Specimen: Blood from Arm, Right Updated: 05/09/23 1339     PTT 25 seconds     FLU/RSV/COVID - if FLU/RSV clinically relevant [[id number]] Collected: 05/09/23 1310    Lab Status:  In process Specimen: Nares from Nose Updated: 05/09/23 1322    HS Troponin I 4hr [[id number]]     Lab Status: No result Specimen: Blood     HS Troponin 0hr (reflex protocol) [[id number]]  (Normal) Collected: 05/09/23 1136    Lab Status: Final result Specimen: Blood from Arm, Right Updated: 05/09/23 1241     hs TnI 0hr 2 ng/L     Comprehensive metabolic panel [[id number]]  (Abnormal) Collected: 05/09/23 1136    Lab Status: Final result Specimen: Blood from Arm, Right Updated: 05/09/23 1220     Sodium 140 mmol/L      Potassium 3 2 mmol/L      Chloride 106 mmol/L      CO2 21 mmol/L      ANION GAP 13 mmol/L      BUN 15 mg/dL      Creatinine 0 76 mg/dL      Glucose 168 mg/dL      Calcium 9 0 mg/dL      AST 23 U/L      ALT 15 U/L      Alkaline Phosphatase 79 U/L      Total Protein 6 7 g/dL      Albumin 4 1 g/dL      Total Bilirubin 0 67 mg/dL      eGFR 74 ml/min/1 73sq m     Narrative:      [place] guidelines for Chronic Kidney Disease (CKD):   •  Stage 1 with normal or high GFR (GFR > 90 mL/min/1 73 square meters)  •  Stage 2 Mild CKD (GFR = 60-89 mL/min/1 73 square meters)  •  Stage 3A Moderate CKD (GFR = 45-59 mL/min/1 73 square meters)  •  Stage 3B Moderate CKD (GFR = 30-44 mL/min/1 73 square meters)  •  Stage 4 Severe CKD (GFR = 15-29 mL/min/1 73 square meters)  •  Stage 5 End Stage CKD (GFR <15 mL/min/1 73 square meters)  Note: GFR calculation is accurate only with a steady state creatinine    CBC and differential [[id number]]  (Abnormal) Collected: 05/09/23 1136    Lab Status: Final result Specimen: Blood from Arm, Right Updated: 05/09/23 1156     WBC 6 41 Thousand/uL      RBC 5 49 Million/uL      Hemoglobin 15 3 g/dL      Hematocrit 46 9 %      MCV 85 fL      MCH 27 9 pg      MCHC 32 6 g/dL      RDW 13 1 %      MPV 11 3 fL      Platelets 266 Thousands/uL      nRBC 0 /100 WBCs      Neutrophils Relative 75 %      Immat GRANS % 1 %      Lymphocytes Relative 18 %      Monocytes Relative 4 %      Eosinophils Relative 1 %      Basophils Relative 1 %      Neutrophils Absolute 4 96 Thousands/µL      Immature Grans Absolute 0 03 Thousand/uL      Lymphocytes Absolute 1 13 Thousands/µL      Monocytes Absolute 0 23 Thousand/µL      Eosinophils Absolute 0 03 Thousand/µL      Basophils Absolute 0 03 Thousands/µL                  CTA stroke alert (head/neck)   Final Result by JOHANA Barksdale MD (05/09 1312)      No significant stenosis of the cervical carotid or vertebral arteries  No significant intracranial stenosis, large vessel occlusion or aneurysm  Findings were directly discussed with Toyin Lechuga at 1:09 p m  Workstation performed: [id number]         CT stroke alert brain   Final Result by JOHANA Barksdale MD (05/09 1313)      No acute intracranial abnormality  Findings were directly discussed with Toyin Lechuga at 1:09 p m           Workstation performed: [id number]         XR chest 1 view portable    (Results Pending) Procedures  ECG 12 Lead Documentation Only    Date/Time: 5/9/2023 12:00 PM  Performed by: MEMO Urbina  Authorized by: MEMO Urbina     Indications / Diagnosis:  Dizziness  ECG reviewed by me, the ED Provider: yes    Patient location:  ED  Previous ECG:     Comparison to cardiac monitor: No    Interpretation:     Interpretation: abnormal    Rate:     ECG rate:  57  Rhythm:     Rhythm: sinus bradycardia    Ectopy:     Ectopy: none    QRS:     QRS axis:  Normal  Conduction:     Conduction: abnormal      Abnormal conduction: non-specific intraventricular conduction delay    ST segments:     ST segments:  Non-specific  T waves:     T waves: non-specific               ED Course  ED Course as of 05/09/23 1414   Tue May 09, 2023   1318 Discussed case with Dr Stephanie Shukla with recommendation for admission to stroke pathway  324 mg ASA, and 25 mg Meclizine  Stroke Assessment     Row Name 05/09/23 1200             NIH Stroke Scale    Interval Baseline      Level of Consciousness (1a ) 0      LOC Questions (1b ) 0      LOC Commands (1c ) 0      Best Gaze (2 ) 0      Visual (3 ) 0      Facial Palsy (4 ) 0      Motor Arm, Left (5a ) 0      Motor Arm, Right (5b ) 0      Motor Leg, Left (6a ) 0      Motor Leg, Right (6b ) 0      Limb Ataxia (7 ) 0      Sensory (8 ) 0      Best Language (9 ) 0      Dysarthria (10 ) 0      Extinction and Inattention (11 ) (Formerly Neglect) 0      Total 0              Flowsheet Row Most Recent Value   Thrombolytic Decision Options    Thrombolytic Decision Patient not a candidate  Patient is not a candidate options Unclear time of onset outside appropriate time window  Medical Decision Making  DDx: vertigo, dysrhythmia, CVA  Patient went to bed without any symptoms and woke up at [facility], [place] with dizziness with change of position from laying flat to sitting up  Patient denies any focal neuro deficits   Patient states at rest does not have dizziness  Patient reports that she has no history of vertigo, has never been dizzy before, will make stroke alert as this started within 24 hours  Patient reports that she went to bed last night without any deficits  Patient states that she woke up today at 5 AM with new onset of dizziness  Patient has a nonfocal neuro exam, with equal finger-to-nose, and equal heel-to-shin  Patient states that she cannot participate in a gait exam due to the dizziness  Patient reports that she has associated nausea  Patient denies any headache, blurred vision  She has an NIH of 0  As she woke up with symptoms and symptoms were appreciated at 5 am she is outside of time window for TNK  Patient's symptoms worsened with turn made in car  Patient has no leukocytosis, no anemia  Patient has no anemia  Patient has a BMP with hypokalemia, replaced IV due to nausea  Patient's initial troponin is 2  As patient has no chest pain, and dizziness started at 5 AM, no further troponins are required per [facility]'s ACS algorithm  Throughout ED evaluation no acute telemetry events, no tachycardia, no dysrhythmias appreciated  Patient's initial blood pressure is elevated however, patient's BP cuff found to be small  With appropriate cuff BP improved  Prior hypertensive readings believed to be related to wrong cuff size  Case was discussed with on-call neurologist Dr Michael Mcwilliams, with recommendation for full dose aspirin, meclizine and admit to stroke  Pathway  Discussed case with Dr Kendall Gandhi of [facility] for admission  Results discussed with patient in agreement with admission  Amount and/or Complexity of Data Reviewed  Labs: ordered  Radiology: ordered  Risk  OTC drugs  Prescription drug management  Decision regarding hospitalization            Disposition  Final diagnoses:   Dizziness   Hypokalemia   Nausea and vomiting     Time reflects when diagnosis was documented in both MDM as applicable and the Disposition within this note     Time User Action Codes Description Comment    5/9/2023 12:34 PM Berneda Craze Add [R42] Dizziness     5/9/2023  1:31 PM Berneda Craze Add [E87 6] Hypokalemia     5/9/2023  1:32 PM Berneda Craze Add [R11 2] Nausea and vomiting       ED Disposition     ED Disposition   Admit    Condition   Stable    Date/Time   Tue May 9, 2023  1:31 PM    Comment   Case was discussed with Dr Ellis Ford and the patient's admission status was agreed to be Admission Status: inpatient status to the service of Dr Ellis Ford   Follow-up Information    None         Patient's Medications   Discharge Prescriptions    No medications on file       No discharge procedures on file      PDMP Review     None          ED Provider  Electronically Signed by           MEMO Pickett  05/09/23 5710

## 2023-05-09 NOTE — ASSESSMENT & PLAN NOTE
· Blood pressure 150s in the ER  · Resume home antihypertensive regimen  · Low suspicion for CVA, goal for normotension

## 2023-05-10 ENCOUNTER — APPOINTMENT (OUTPATIENT)
Dept: NON INVASIVE DIAGNOSTICS | Facility: HOSPITAL | Age: 83
End: 2023-05-10

## 2023-05-10 VITALS
OXYGEN SATURATION: 96 % | HEIGHT: 64 IN | RESPIRATION RATE: 18 BRPM | TEMPERATURE: 97.9 F | WEIGHT: 210 LBS | HEART RATE: 61 BPM | BODY MASS INDEX: 35.85 KG/M2 | SYSTOLIC BLOOD PRESSURE: 168 MMHG | DIASTOLIC BLOOD PRESSURE: 68 MMHG

## 2023-05-10 PROBLEM — E66.9 OBESITY: Status: ACTIVE | Noted: 2021-03-29

## 2023-05-10 LAB
AORTIC ROOT: 2.5 CM
APICAL FOUR CHAMBER EJECTION FRACTION: 59 %
ASCENDING AORTA: 3.3 CM
ATRIAL RATE: 57 BPM
CHOLEST SERPL-MCNC: 190 MG/DL
E WAVE DECELERATION TIME: 152 MS
EST. AVERAGE GLUCOSE BLD GHB EST-MCNC: 117 MG/DL
FRACTIONAL SHORTENING: 43 % (ref 28–44)
HBA1C MFR BLD: 5.7 %
HDLC SERPL-MCNC: 62 MG/DL
INTERVENTRICULAR SEPTUM IN DIASTOLE (PARASTERNAL SHORT AXIS VIEW): 0.8 CM
INTERVENTRICULAR SEPTUM: 0.8 CM (ref 0.6–1.1)
LAAS-AP2: 18.9 CM2
LAAS-AP4: 18.6 CM2
LDLC SERPL CALC-MCNC: 104 MG/DL (ref 0–100)
LEFT ATRIUM SIZE: 4 CM
LEFT INTERNAL DIMENSION IN SYSTOLE: 2.6 CM (ref 2.1–4)
LEFT VENTRICULAR INTERNAL DIMENSION IN DIASTOLE: 4.6 CM (ref 3.5–6)
LEFT VENTRICULAR POSTERIOR WALL IN END DIASTOLE: 0.8 CM
LEFT VENTRICULAR STROKE VOLUME: 72 ML
LVSV (TEICH): 72 ML
MV E'TISSUE VEL-LAT: 10 CM/S
MV E'TISSUE VEL-SEP: 9 CM/S
MV PEAK A VEL: 0.71 M/S
MV PEAK E VEL: 85 CM/S
MV STENOSIS PRESSURE HALF TIME: 44 MS
MV VALVE AREA P 1/2 METHOD: 5 CM2
PLATELET # BLD AUTO: 186 THOUSANDS/UL (ref 149–390)
PMV BLD AUTO: 10.9 FL (ref 8.9–12.7)
PR INTERVAL: 134 MS
QRS AXIS: 15 DEGREES
QRSD INTERVAL: 120 MS
QT INTERVAL: 472 MS
QTC INTERVAL: 459 MS
RIGHT ATRIUM AREA SYSTOLE A4C: 14.4 CM2
RIGHT VENTRICLE ID DIMENSION: 4.3 CM
SL CV LEFT ATRIUM LENGTH A2C: 5.7 CM
SL CV LV EF: 55
SL CV PED ECHO LEFT VENTRICLE DIASTOLIC VOLUME (MOD BIPLANE) 2D: 98 ML
SL CV PED ECHO LEFT VENTRICLE SYSTOLIC VOLUME (MOD BIPLANE) 2D: 25 ML
T WAVE AXIS: 12 DEGREES
TR MAX PG: 38 MMHG
TR PEAK VELOCITY: 3.1 M/S
TRICUSPID ANNULAR PLANE SYSTOLIC EXCURSION: 2.6 CM
TRICUSPID VALVE PEAK REGURGITATION VELOCITY: 3.07 M/S
TRIGL SERPL-MCNC: 119 MG/DL
VENTRICULAR RATE: 57 BPM

## 2023-05-10 RX ORDER — MECLIZINE HYDROCHLORIDE 25 MG/1
25 TABLET ORAL EVERY 8 HOURS PRN
Qty: 30 TABLET | Refills: 0 | Status: SHIPPED | OUTPATIENT
Start: 2023-05-10

## 2023-05-10 RX ORDER — VALSARTAN 160 MG/1
160 TABLET ORAL DAILY
Qty: 30 TABLET | Refills: 1 | Status: SHIPPED | OUTPATIENT
Start: 2023-05-10

## 2023-05-10 RX ORDER — SIMVASTATIN 40 MG
40 TABLET ORAL DAILY
Qty: 30 TABLET | Refills: 0 | Status: SHIPPED | OUTPATIENT
Start: 2023-05-10 | End: 2023-05-17 | Stop reason: SDUPTHER

## 2023-05-10 RX ADMIN — PANTOPRAZOLE SODIUM 40 MG: 40 TABLET, DELAYED RELEASE ORAL at 05:18

## 2023-05-10 RX ADMIN — POTASSIUM CHLORIDE 10 MEQ: 750 TABLET, EXTENDED RELEASE ORAL at 09:44

## 2023-05-10 RX ADMIN — HYDROCHLOROTHIAZIDE 25 MG: 25 TABLET ORAL at 09:44

## 2023-05-10 RX ADMIN — AMLODIPINE BESYLATE 5 MG: 5 TABLET ORAL at 09:44

## 2023-05-10 RX ADMIN — LOSARTAN POTASSIUM 50 MG: 50 TABLET, FILM COATED ORAL at 09:44

## 2023-05-10 RX ADMIN — ENOXAPARIN SODIUM 40 MG: 40 INJECTION SUBCUTANEOUS at 09:44

## 2023-05-10 NOTE — QUICK NOTE
MRI brain wo contrast showed no acute infarcts  Patient most likely having BPPV    Discontinued aspirin  Continue meclizine PRN  Recommend vestibular therapy  Patient states she will talk with her ENT doctor  No need for outpatient neurology followup  If echo is unconcerning, patient is cleared for discharge from neurology

## 2023-05-10 NOTE — DISCHARGE INSTR - AVS FIRST PAGE
High blood pressure:  Continue home meds  Increase Diovan  Follow low salt dit  Follow-up with cardiology       High cholesterol:   Increase Zocor to 40 mg daily     Dizziness:  Remain well hydrated  Take Meclizine as needed   Recommend outpatient vestibular therapy

## 2023-05-10 NOTE — ASSESSMENT & PLAN NOTE
· Blood pressures high normal since admission  · Continue home medications, Norvasc 5 mg twice daily, hydrochlorothiazide 25 mg daily, and increase Diovan from 80 to 160 mg daily  · Follow-up with PCP and cardiologist

## 2023-05-10 NOTE — ASSESSMENT & PLAN NOTE
• Presents with a days worth of dizziness, worse with positional changes  History of BPPV and current presentation is suspicious for same  She had improvement with meclizine and Zofran in the ER  Neurology was consulted in the ER and recommended stroke pathway    • CT: No acute abnormality  • CTA: No large vessel occlusion or disease noted  • Stroke Pathway  o MRI brain negative  o Echocardiogram unremarkable  o Likely BPPV  o Continue meclizine as needed and recommend outpatient follow-up with ENT and vestibular therapy

## 2023-05-10 NOTE — OCCUPATIONAL THERAPY NOTE
Occupational Therapy Screen     Patient Name: Jewel العلي Date: 5/10/2023  Problem List  Principal Problem:    Vertigo  Active Problems:    History of DVT of lower extremity    Essential hypertension    Hyperlipidemia    Past Medical History  Past Medical History:   Diagnosis Date    Acid reflux     DVT (deep venous thrombosis) ([facility] [address] ) 01/2018    Hearing loss     Hypercholesterolemia     Hypertension     Impingement syndrome of left shoulder 7/12/2019    Wears glasses      Past Surgical History  Past Surgical History:   Procedure Laterality Date    BREAST BIOPSY Left     1995 left, 1997 unknown side    COLONOSCOPY      TONSILLECTOMY      TUBAL LIGATION      UPPER GASTROINTESTINAL ENDOSCOPY      WISDOM TOOTH EXTRACTION  1962        05/10/23 1333   OT Last Visit   OT Visit Date 05/10/23  (Wednesday)   Note Type   Note type Screen   Additional Comments OT orders received and chart review completed  Spoke w/ PT, Baljit Pritchett  PT recommending OPPT for vertigo  Pt completing ADLs at /near baseline level of I w/ + time  No acute OT needs at this time  Will screen from OT       Vipin Ayala OTR/L  [id number]  [id number]

## 2023-05-10 NOTE — ASSESSMENT & PLAN NOTE
· Lipid panel reviewed,   · Increase Zocor from 20 mg to 40 mg daily  · Repeat LFTs and lipid panel in 6 to 8 weeks

## 2023-05-10 NOTE — H&P
[facility]  H&P  Name: Alysha Oliva [de-identified] y o  female I MRN: [id number]  Unit/Bed#: S -01 I Date of Admission: 5/9/2023   Date of Service: 5/10/2023 I Hospital Day: 1      Assessment/Plan   * Vertigo  Assessment & Plan  • Presents with a days worth of dizziness, worse with positional changes  History of BPPV and current presentation is suspicious for same  She had improvement with meclizine and Zofran in the ER  Neurology was consulted in the ER and recommended stroke pathway  • CT: No acute abnormality  • CTA: No large vessel occlusion or disease noted  • Stroke Pathway  o MRI brain negative  o Echocardiogram unremarkable  o Likely BPPV  o Continue meclizine as needed and recommend outpatient follow-up with ENT and vestibular therapy    Essential hypertension  Assessment & Plan  · Blood pressures high normal since admission  · Continue home medications, Norvasc 5 mg twice daily, hydrochlorothiazide 25 mg daily, and increase Diovan from 80 to 160 mg daily  · Follow-up with PCP and cardiologist    Hyperlipidemia  Assessment & Plan  · Lipid panel reviewed,   · Increase Zocor from 20 mg to 40 mg daily  · Repeat LFTs and lipid panel in 6 to 8 weeks    Obesity  Assessment & Plan  · Recommend weight loss    History of DVT of lower extremity  Assessment & Plan  · No longer on Eliquis       Medical Problems     Resolved Problems  Date Reviewed: 5/10/2023   None       Discharging Physician / Practitioner: MEMO Price  PCP: Lori Seals MD  Admission Date:   Admission Orders (From admission, onward)     Ordered        05/09/23 1508  Place in Observation  Once            05/09/23 1332  INPATIENT ADMISSION  Once                      Discharge Date: 05/10/23    Consultations During Hospital Stay:  · Neurology     Procedures Performed:   · CXR: No acute cardiopulmonary disease  · CT brain: No acute intracranial abnormality    · CTA H/N: No significant stenosis of the cervical carotid or vertebral arteries  No significant intracranial stenosis, large vessel occlusion or aneurysm  · MRI brain: No MR evidence of acute ischemia  · ECHO: LVEF 37%, diastolic function is normal    Significant Findings / Test Results:   · Lipid panel,   · Blood pressure high normal    Incidental Findings:   · Vertigo   · HLD  · HTN  · I reviewed the above mentioned incidental findings with the patient and/or family and they expressed understanding  Test Results Pending at Discharge (will require follow up): · None      Outpatient Tests Requested:  · Follow-up with PCP for lipid and LFT monitoring     Complications:  None     Reason for Admission: Dizziness/Vertigo     Hospital Course:   Cassy Cooney is a [de-identified] y o  female patient with a PMH including BPPV, HLD, HTN who originally presented to the hospital on 5/9/2023 due to dizziness for 1 day  Patient was admitted for stroke pathway  MRI brain was negative for acute CVA  Patient was seen by neuro check  Patient most likely having PVCs  Aspirin was discontinued  Patient was started on meclizine as needed  Recommend outpatient follow-up with ENT and vestibular therapy  No need for outpatient neurology follow-up  Will increase statin and Diovan on discharge  Advised patient to follow-up with her PCP and cardiologist     Please see above list of diagnoses and related plan for additional information  Condition at Discharge: stable    Discharge Day Visit / Exam:   Subjective: Patient seen and examined at bedside  She is resting out of bed in chair with her feet elevated  She reports some leg swelling which she believes is from not wearing her compression stockings while hospitalized  She denies any headache, nausea, vomiting, diarrhea, chest pain, or shortness of breath  She desires discharge home today    Vitals: Blood Pressure: 168/68 (05/10/23 0959)  Pulse: 61 (05/10/23 0959)  Temperature: 97 9 °F (36 6 °C) (05/10/23 "0818)  Temp Source: Oral (05/09/23 1124)  Respirations: 18 (05/10/23 0818)  Height: 5' 4\" (162 6 cm) (05/10/23 0959)  Weight - Scale: 95 3 kg (210 lb) (05/10/23 0959)  SpO2: 96 % (05/10/23 0818)  Exam:   Physical Exam  Vitals and nursing note reviewed  Constitutional:       General: She is not in acute distress  Appearance: She is obese  She is not toxic-appearing or diaphoretic  HENT:      Head: Normocephalic  Mouth/Throat:      Mouth: Mucous membranes are moist    Eyes:      Conjunctiva/sclera: Conjunctivae normal    Cardiovascular:      Rate and Rhythm: Normal rate  Pulmonary:      Effort: Pulmonary effort is normal       Breath sounds: Normal breath sounds  No wheezing, rhonchi or rales  Abdominal:      General: Bowel sounds are normal  There is no distension  Palpations: Abdomen is soft  Tenderness: There is no abdominal tenderness  Musculoskeletal:         General: Normal range of motion  Cervical back: Normal range of motion  Right lower leg: Edema (trace) present  Left lower leg: Edema (trace) present  Skin:     General: Skin is warm and dry  Capillary Refill: Capillary refill takes less than 2 seconds  Neurological:      Mental Status: She is alert and oriented to person, place, and time  Mental status is at baseline  Psychiatric:         Mood and Affect: Mood normal          Behavior: Behavior normal          Thought Content: Thought content normal          Judgment: Judgment normal          Discussion with Family: Patient declined call to   Discharge instructions/Information to patient and family:   See after visit summary for information provided to patient and family  Provisions for Follow-Up Care:  See after visit summary for information related to follow-up care and any pertinent home health orders  Disposition:   Home    Planned Readmission: None     Discharge Statement:  I spent > 30 minutes discharging the patient   " This time was spent on the day of discharge  I had direct contact with the patient on the day of discharge  Greater than 50% of the total time was spent examining patient, answering all patient questions, arranging and discussing plan of care with patient as well as directly providing post-discharge instructions  Additional time then spent on discharge activities  Discharge Medications:  See after visit summary for reconciled discharge medications provided to patient and/or family        **Please Note: This note may have been constructed using a voice recognition system**

## 2023-05-10 NOTE — PHYSICAL THERAPY NOTE
Physical Therapy Evaluation    Patient's Name: Sarah Reed    Admitting Diagnosis  Hypokalemia [E87 6]  Dizziness [R42]  Nausea and vomiting [R11 2]    Problem List  Patient Active Problem List   Diagnosis    History of DVT of lower extremity    Essential hypertension    Fibrocystic breast changes    Gastroesophageal reflux disease    Hearing loss    Hiatal hernia    Hyperlipidemia    Varicose veins of left lower extremity    Bradycardia    Vertigo       Past Medical History  Past Medical History:   Diagnosis Date    Acid reflux     DVT (deep venous thrombosis) ([address] ) 2018    Hearing loss     Hypercholesterolemia     Hypertension     Impingement syndrome of left shoulder 2019    Wears glasses        Past Surgical History  Past Surgical History:   Procedure Laterality Date    BREAST BIOPSY Left      left, 1997 unknown side    COLONOSCOPY      TONSILLECTOMY      TUBAL LIGATION      UPPER GASTROINTESTINAL ENDOSCOPY      WISDOM TOOTH EXTRACTION  1962        05/10/23 1052   PT Last Visit   PT Visit Date 05/10/23   Note Type   Note type Evaluation   Pain Assessment   Pain Assessment Tool 0-10   Pain Score No Pain   Restrictions/Precautions   Weight Bearing Precautions Per Order No   Other Precautions Fall Risk; Chair Alarm; Bed Alarm   Home Living   Type of [address] [place] [address] in basement; One level   Home Equipment   (none)   Additional Comments no AD at baseline   Prior Function   Level of [place] Independent with functional mobility; Independent with ADLs   Lives With Spouse   Receives Help [facility] in the last 6 months 1 to 4   Vocational Retired   Comments no AD at baseline, + drives, + history of OPPT   General   Family/Caregiver Present No   Cognition   Overall Cognitive Status WFL   Orientation Level Oriented X4   Following Commands Follows one step commands without difficulty   Comments pt ID by wristband, name and    Subjective   Subjective pt seated edge of bed, agreeable to PT eval, notes improvement in dizziness, not experiencing an exacerbation currently, states a history of about 4 years ago where she had similar issue, was assessed by ENT and resolved   RLE Assessment   RLE Assessment WFL   LLE Assessment   LLE Assessment WFL   Bed Mobility   Additional Comments unable to assess pt seated EOB when PT enters and at end of eval   Transfers   Sit to Stand 5  Supervision   Additional items Increased time required   Stand to Sit 5  Supervision   Additional items Increased time required   Ambulation/Elevation   Gait pattern Improper Weight shift;Narrow AYUSH   Gait Assistance 5  Supervision   Assistive Device None   Distance 35' x 2   Stair Management Assistance 5  Supervision   Additional items Increased time required   Stair Management Technique One rail R;One rail L   Number of Stairs 5   Ambulation/Elevation Additional Comments steady gait without deviation, pt did not experience an exacerbation of symptoms with functional mobility   Balance   Static Sitting Fair +   Dynamic Sitting Fair +   Static Standing Fair +   Dynamic Standing Fair +   Ambulatory Fair +   Activity Tolerance   Activity Tolerance Patient tolerated treatment well   Nurse Made Aware RN pre/post   Assessment   Prognosis Good   Problem List Decreased strength;Decreased endurance; Impaired balance;Decreased mobility   Assessment Pt is a [de-identified] y o  female seen for PT evaluation s/p admit to [facility] on 5/9/[facility]  Pt was admitted with a primary dx of: vertigo  PT now consulted for assessment of mobility and d/c needs  Pt with Up and OOB as tolerated orders  Pts current comorbidities and personal factors effecting treatment include: HTN, GERD, HLD, bradycardia  Pts current clinical presentation is Unstable/Unpredictable (high complexity) due to Ongoing medical management for primary dx, Decreased activity tolerance compared to baseline, Fall risk, Ongoing telemetry monitoring   Prior to admission, pt was independent without AD  Upon evaluation, pt currently is requiring Supervision for bed mobility; Supervision for transfers and Supervision for ambulation 35 ft w/ no AD  Although patient is functioning slightly below baseline, she currently does not present with any needs for acute PT services  Discussed with pt she could benefit from potential follow up with OPPT/vestibular specialist  Given this, pt will be removed from current PT caseload  If there is a change in status, please place new orders  At conclusion of PT session pt returned BTB, all needs in reach and RN notified of session findings/recommendations with phone and call bell within reach  Pt denies any further questions at this time  Recommend home with OPPT upon hospital D/C  Goals   Patient Goals to go home   Plan   PT Frequency Other (Comment)  (1x PT eval)   Recommendation   PT Discharge Recommendation Home with outpatient rehabilitation  (vestibular)   AM-PAC Basic Mobility Inpatient   Turning in Flat Bed Without Bedrails 3   Lying on Back to Sitting on Edge of Flat Bed Without Bedrails 3   Moving Bed to Chair 4   Standing Up From Chair Using Arms 4   Walk in Room 4   Climb 3-5 Stairs With Railing 4   Basic Mobility Inpatient Raw Score 22   Basic Mobility Standardized Score 47 4   Highest Level Of Mobility   JH-HLM Goal 7: Walk 25 feet or more   JH-HLM Achieved 7: Walk 25 feet or more   End of Consult   Patient Position at End of Consult Seated edge of bed; All needs within reach   The patient's AM-PAC Basic Mobility Inpatient Short Form Raw Score is 22  A Raw score of greater than 16 suggests the patient may benefit from discharge to home  Please also refer to the recommendation of the Physical Therapist for safe discharge planning      Terrence Smith, PT

## 2023-05-10 NOTE — DISCHARGE SUMMARY
[facility]  Discharge- Alejandrina Valenzuelaook 1940, [de-identified] y o  female MRN: [id number]  Unit/Bed#: S -01 Encounter: [id number]  Primary Care Provider: Abad Cruz MD   Date and time admitted to hospital: 5/9/2023 11:29 AM    * Vertigo  Assessment & Plan  • Presents with a days worth of dizziness, worse with positional changes  History of BPPV and current presentation is suspicious for same  She had improvement with meclizine and Zofran in the ER  Neurology was consulted in the ER and recommended stroke pathway  • CT: No acute abnormality  • CTA: No large vessel occlusion or disease noted  • Stroke Pathway  o MRI brain negative  o Echocardiogram unremarkable  o Likely BPPV  o Continue meclizine as needed and recommend outpatient follow-up with ENT and vestibular therapy    Essential hypertension  Assessment & Plan  · Blood pressures high normal since admission  · Continue home medications, Norvasc 5 mg twice daily, hydrochlorothiazide 25 mg daily, and increase Diovan from 80 to 160 mg daily  · Follow-up with PCP and cardiologist    Hyperlipidemia  Assessment & Plan  · Lipid panel reviewed,   · Increase Zocor from 20 mg to 40 mg daily  · Repeat LFTs and lipid panel in 6 to 8 weeks    Obesity  Assessment & Plan  · Recommend weight loss    History of DVT of lower extremity  Assessment & Plan  · No longer on Eliquis              Discharging Physician / Practitioner: MEMO Mo  PCP: Abad Cruz MD  Admission Date:       Admission Orders (From admission, onward)       Ordered         05/09/23 1508   Place in Observation  Once             05/09/23 1332   INPATIENT ADMISSION  Once                         Discharge Date: 05/10/23     Consultations During Hospital Stay:  • Neurology      Procedures Performed:   • CXR: No acute cardiopulmonary disease  • CT brain: No acute intracranial abnormality    • CTA H/N: No significant stenosis of the cervical carotid or vertebral arteries  No significant intracranial stenosis, large vessel occlusion or aneurysm  • MRI brain: No MR evidence of acute ischemia  • ECHO: LVEF 42%, diastolic function is normal     Significant Findings / Test Results:   • Lipid panel,   • Blood pressure high normal     Incidental Findings:   • Vertigo   • HLD  • HTN  • I reviewed the above mentioned incidental findings with the patient and/or family and they expressed understanding      Test Results Pending at Discharge (will require follow up): • None      Outpatient Tests Requested:  • Follow-up with PCP for lipid and LFT monitoring      Complications:  None      Reason for Admission: Dizziness/Vertigo      Hospital Course:   Adama Rader is a [de-identified] y o  female patient with a PMH including BPPV, HLD, HTN who originally presented to the hospital on 5/9/2023 due to dizziness for 1 day  Patient was admitted for stroke pathway  MRI brain was negative for acute CVA  Patient was seen by neuro check  Patient most likely having PVCs  Aspirin was discontinued  Patient was started on meclizine as needed  Recommend outpatient follow-up with ENT and vestibular therapy  No need for outpatient neurology follow-up      Will increase statin and Diovan on discharge  Advised patient to follow-up with her PCP and cardiologist      Please see above list of diagnoses and related plan for additional information       Condition at Discharge: stable     Discharge Day Visit / Exam:   Subjective: Patient seen and examined at bedside  She is resting out of bed in chair with her feet elevated  She reports some leg swelling which she believes is from not wearing her compression stockings while hospitalized  She denies any headache, nausea, vomiting, diarrhea, chest pain, or shortness of breath  She desires discharge home today    Vitals: Blood Pressure: 168/68 (05/10/23 0959)  Pulse: 61 (05/10/23 0959)  Temperature: 97 9 °F (36 6 °C) (05/10/23 0818)  Temp Source: "Oral (05/09/23 1124)  Respirations: 18 (05/10/23 0818)  Height: 5' 4\" (162 6 cm) (05/10/23 0959)  Weight - Scale: 95 3 kg (210 lb) (05/10/23 0959)  SpO2: 96 % (05/10/23 0818)  Exam:   Physical Exam  Vitals and nursing note reviewed  Constitutional:       General: She is not in acute distress  Appearance: She is obese  She is not toxic-appearing or diaphoretic  HENT:      Head: Normocephalic  Mouth/Throat:      Mouth: Mucous membranes are moist    Eyes:      Conjunctiva/sclera: Conjunctivae normal    Cardiovascular:      Rate and Rhythm: Normal rate  Pulmonary:      Effort: Pulmonary effort is normal       Breath sounds: Normal breath sounds  No wheezing, rhonchi or rales  Abdominal:      General: Bowel sounds are normal  There is no distension  Palpations: Abdomen is soft  Tenderness: There is no abdominal tenderness  Musculoskeletal:         General: Normal range of motion  Cervical back: Normal range of motion  Right lower leg: Edema (trace) present  Left lower leg: Edema (trace) present  Skin:     General: Skin is warm and dry  Capillary Refill: Capillary refill takes less than 2 seconds  Neurological:      Mental Status: She is alert and oriented to person, place, and time  Mental status is at baseline  Psychiatric:         Mood and Affect: Mood normal          Behavior: Behavior normal          Thought Content: Thought content normal          Judgment: Judgment normal             Discussion with Family: Patient declined call to        Discharge instructions/Information to patient and family:   See after visit summary for information provided to patient and family        Provisions for Follow-Up Care:  See after visit summary for information related to follow-up care and any pertinent home health orders  Disposition:   Home     Planned Readmission: None     Discharge Statement:  I spent > 30 minutes discharging the patient   This time was " spent on the day of discharge  I had direct contact with the patient on the day of discharge  Greater than 50% of the total time was spent examining patient, answering all patient questions, arranging and discussing plan of care with patient as well as directly providing post-discharge instructions    Additional time then spent on discharge activities      Discharge Medications:  See after visit summary for reconciled discharge medications provided to patient and/or family        **Please Note: This note may have been constructed using a voice recognition system**              Of note, this was initially written in the H&P template, therefore, copied and pasted into a discharge summary template

## 2023-05-11 ENCOUNTER — TRANSITIONAL CARE MANAGEMENT (OUTPATIENT)
Dept: FAMILY MEDICINE CLINIC | Facility: CLINIC | Age: 83
End: 2023-05-11

## 2023-05-17 ENCOUNTER — OFFICE VISIT (OUTPATIENT)
Dept: FAMILY MEDICINE CLINIC | Facility: CLINIC | Age: 83
End: 2023-05-17

## 2023-05-17 VITALS
BODY MASS INDEX: 36.05 KG/M2 | OXYGEN SATURATION: 98 % | DIASTOLIC BLOOD PRESSURE: 80 MMHG | SYSTOLIC BLOOD PRESSURE: 138 MMHG | HEIGHT: 64 IN | HEART RATE: 54 BPM | TEMPERATURE: 98 F

## 2023-05-17 DIAGNOSIS — E66.01 OBESITY, MORBID (HCC): ICD-10-CM

## 2023-05-17 DIAGNOSIS — E78.00 PURE HYPERCHOLESTEROLEMIA: ICD-10-CM

## 2023-05-17 DIAGNOSIS — I10 ESSENTIAL HYPERTENSION: ICD-10-CM

## 2023-05-17 DIAGNOSIS — R42 VERTIGO: Primary | ICD-10-CM

## 2023-05-17 DIAGNOSIS — K44.9 HIATAL HERNIA: ICD-10-CM

## 2023-05-17 DIAGNOSIS — K21.9 GASTROESOPHAGEAL REFLUX DISEASE WITHOUT ESOPHAGITIS: ICD-10-CM

## 2023-05-17 DIAGNOSIS — E87.6 HYPOKALEMIA: ICD-10-CM

## 2023-05-17 RX ORDER — SIMVASTATIN 20 MG
20 TABLET ORAL DAILY
Qty: 90 TABLET | Refills: 3
Start: 2023-05-17 | End: 2023-05-26 | Stop reason: SDUPTHER

## 2023-05-17 NOTE — PROGRESS NOTES
TCM Call     Date and time call was made  2023  3:35 PM    Hospital care reviewed  Records reviewed    Date of Admission  23    Date of discharge  05/10/23    Diagnosis  Vertigo    Disposition  Home    Were the patients medications reviewed and updated  Yes    Current Symptoms  None      TCM Call     Post hospital issues  None    Should patient be enrolled in anticoag monitoring? No    Scheduled for follow up? Yes    Patients specialists  Cardiologist    Did you obtain your prescribed medications  Yes    Do you need help managing your prescriptions or medications  No    Is transportation to your appointment needed  No    I have advised the patient to call PCP with any new or worsening symptoms  Geraldine Paget, MA    Living Arrangements  Spouse or Significiant other    Support System  Spouse    The type of support provided  Emotional; Physical    Do you have social support  Yes, as much as I need    Are you recieving any outpatient services  No    Are you recieving home care services  No    Are you using any community resources  No    Current waiver services  No    Have you fallen in the last 12 months  No    Interperter language line needed  No    Counseling  Patient    Counseling topics  Prognosis; Activities of daily living              Name: Blayne Fish      : 1940      MRN: [id number]  Encounter Provider: Wes Robison MD  Encounter Date: 2023   Encounter department: [facility]     1  Vertigo    2  Hypokalemia    3  Essential hypertension    4  Pure hypercholesterolemia  -     simvastatin (ZOCOR) 20 mg tablet; Take 1 tablet (20 mg total) by mouth daily    5  Gastroesophageal reflux disease without esophagitis    6  Hiatal hernia    7  Obesity, morbid ([address] )    Continue with current medications  Vestibular Rx deferred at this time  Monitor blood pressures at home  Follow-up with Cardiology          Subjective     TCM s/p admission 2023 to 05/10/2023 Dx acute onset vertigo with nausea and dry heaves  Low K+ at 3 2  Extensive work-up in the hospital normal- this included CT scan head, CTA and MRI of brain  Echocardiogram showed normal left ventricular systolic function-EF 17%  Mild MR/TR  Patient is currently asymptomatic she was seen in follow-up by ENT  Current medications reviewed  Dose of Valsartan increased to 160 mg daily during hospitalization  Hypertension currently on Amlodipine 5 mg daily, Valsartan 160 mg daily and HCTZ 25 mg daily  05/2023  Creatinine 0 76  GFR 74  Electrolytes normal except for  K+ 3 2  on K+ supplement  Hgb 15 3  Hyperlipidemia on Simvastatin 20 mg/day  05/2023 lipid profile cholesterol 190 decreased from 204   Triglycerides 119  HDL 62    LFTs normal        Procedure: XR chest 1 view portable    Result Date: 5/10/2023  Narrative: CHEST INDICATION:   dizziness  COMPARISON:  3/17/2014 EXAM PERFORMED/VIEWS:  XR CHEST PORTABLE FINDINGS: Cardiomediastinal silhouette appears unremarkable  The lungs are clear  No pneumothorax or pleural effusion  Osseous structures appear within normal limits for patient age  Impression: No acute cardiopulmonary disease  Workstation performed: [id number]     Procedure: MRI brain wo contrast    Result Date: 5/10/2023  Narrative: MRI BRAIN WITHOUT CONTRAST INDICATION: stroke  COMPARISON:   None  TECHNIQUE:  Multiplanar, multisequence imaging of the brain was performed  IMAGE QUALITY:  Diagnostic  FINDINGS: BRAIN PARENCHYMA:  There is no discrete mass, mass effect or midline shift  There is no intracranial hemorrhage  There is no evidence of acute infarction and diffusion imaging is unremarkable  Small scattered hyperintensities on T2/FLAIR imaging are noted in the periventricular and subcortical white matter demonstrating an appearance that is statistically most likely to represent mild microangiopathic change  VENTRICLES:  Normal for the patient's age   SELLA AND PITUITARY GLAND:  Normal  ORBITS:  Normal  PARANASAL SINUSES:  Normal  VASCULATURE:  Evaluation of the major intracranial vasculature demonstrates appropriate flow voids  CALVARIUM AND SKULL BASE:  Normal  EXTRACRANIAL SOFT TISSUES:  Normal      Impression: No MR evidence of acute ischemia  Workstation performed: [id number]     Procedure: CT stroke alert brain    Result Date: 5/9/2023  Narrative: CT BRAIN - STROKE ALERT PROTOCOL INDICATION:   Stroke Alert  Vertigo COMPARISON:  None  TECHNIQUE:  CT examination of the brain was performed  In addition to axial images, coronal reformatted images were created and submitted for interpretation  Radiation dose length product (DLP) for this visit:  1098 mGy-cm   This examination, like all CT scans performed in the [facility], was performed utilizing techniques to minimize radiation dose exposure, including the use of iterative reconstruction and automated exposure control  IMAGE QUALITY:  Diagnostic  FINDINGS: PARENCHYMA:  No intracranial mass, mass effect or midline shift  No CT signs of acute infarction  No acute parenchymal hemorrhage  Diminished in region of the periventricular and subcortical matter due to mild chronic microangiopathy  VENTRICLES AND EXTRA-AXIAL SPACES:  Normal for the patient's age  VISUALIZED ORBITS: Normal visualized orbits  PARANASAL SINUSES: Mild maxillary sinus mucosal thickening  CALVARIUM AND EXTRACRANIAL SOFT TISSUES:   Normal      Impression: No acute intracranial abnormality  Findings were directly discussed with Heaven Villegas at 1:09 p m  Workstation performed: [id number]     Procedure: CTA stroke alert (head/neck)    Result Date: 5/9/2023  Narrative: CTA NECK AND BRAIN WITH CONTRAST INDICATION: Stroke Alert vertigo COMPARISON:   Immediately preceding noncontrast CT TECHNIQUE:   Post contrast imaging was performed after administration of iodinated contrast through the neck and brain   Post contrast axial 0 625 mm images timed to opacify the arterial system  3D rendering was performed on an independent workstation  MIP reconstructions performed  Coronal reconstructions were performed of the noncontrast portion of the brain  Radiation dose length product (DLP) for this visit:  1059 mGy-cm   This examination, like all CT scans performed in the [facility], was performed utilizing techniques to minimize radiation dose exposure, including the use of iterative reconstruction and automated exposure control  IV Contrast:  85 mL of iohexol (OMNIPAQUE) IMAGE QUALITY:   Diagnostic FINDINGS: CERVICAL VASCULATURE AORTIC ARCH AND GREAT VESSELS: Mild atherosclerotic disease of the arch, proximal great vessels and visualized subclavian vessels  No significant stenosis  RIGHT VERTEBRAL ARTERY CERVICAL SEGMENT:  Normal origin  The vessel is normal in caliber throughout the neck  LEFT VERTEBRAL ARTERY CERVICAL SEGMENT:  Normal origin  The vessel is normal in caliber throughout the neck  RIGHT EXTRACRANIAL CAROTID SEGMENT: Mild atherosclerotic disease of the distal common carotid artery and proximal cervical internal carotid artery without significant stenosis compared to the more distal ICA  LEFT EXTRACRANIAL CAROTID SEGMENT: Mild atherosclerotic disease of the distal common carotid artery and proximal cervical internal carotid artery without significant stenosis compared to the more distal ICA  NASCET criteria was used to determine the degree of internal carotid artery diameter stenosis  INTRACRANIAL VASCULATURE INTERNAL CAROTID ARTERIES: Mild atherosclerotic disease in the cavernous and supraclinoid internal carotid arteries without significant stenosis  Normal ophthalmic artery origins  ANTERIOR CIRCULATION:  Symmetric A1 segments and anterior cerebral arteries with normal enhancement  Normal anterior communicating artery   MIDDLE CEREBRAL ARTERY CIRCULATION:  M1 segment and middle cerebral artery branches demonstrate normal enhancement bilaterally  DISTAL VERTEBRAL ARTERIES:  Normal distal vertebral arteries  Posterior inferior cerebellar artery origins are normal  Normal vertebral basilar junction  BASILAR ARTERY:  Basilar artery is normal in caliber  Normal superior cerebellar arteries  POSTERIOR CEREBRAL ARTERIES: Both posterior cerebral arteries arises from the basilar tip  Both arteries demonstrate normal enhancement  VENOUS STRUCTURES: Unremarkable  Left transverse and sigmoid sinuses are hypoplastic  NON VASCULAR ANATOMY BONY STRUCTURES:  No acute osseous abnormality  Spinal degenerative changes  SOFT TISSUES OF THE NECK:  Normal  THORACIC INLET:  Unremarkable  Impression: No significant stenosis of the cervical carotid or vertebral arteries  No significant intracranial stenosis, large vessel occlusion or aneurysm  Findings were directly discussed with Polly Dutton at 1:09 p m  Workstation performed: [id number]     Procedure: Echo complete w/ contrast if indicated    Result Date: 5/10/2023  Narrative: •  Left Ventricle: Left ventricular cavity size is normal  Wall thickness is normal  The left ventricular ejection fraction is 55%  Systolic function is normal  Wall motion is normal  Diastolic function is normal  •  Left Atrium: The atrium is mildly dilated  •  Mitral Valve: There is mild regurgitation  •  Tricuspid Valve: There is mild regurgitation         Lab Results   Component Value Date    WBC 6 41 05/09/2023    HGB 15 3 05/09/2023    HCT 46 9 (H) 05/09/2023    MCV 85 05/09/2023     05/10/2023     Lab Results   Component Value Date    SODIUM 140 05/09/2023    K 3 2 (L) 05/09/2023     05/09/2023    CO2 21 05/09/2023    AGAP 13 05/09/2023    BUN 15 05/09/2023    CREATININE 0 76 05/09/2023    GLUC 168 (H) 05/09/2023    GLUF 92 03/07/2023    CALCIUM 9 0 05/09/2023    AST 23 05/09/2023    ALT 15 05/09/2023    ALKPHOS 79 05/09/2023    TP 6 7 05/09/2023    TBILI 0 67 05/09/2023    EGFR 74 05/09/2023     Lab Results Component Value Date    CHOLESTEROL 190 05/10/2023    CHOLESTEROL 204 (H) 03/07/2023    CHOLESTEROL 200 10/13/2021     Lab Results   Component Value Date    HDL 62 05/10/2023    HDL 82 03/07/2023    HDL 72 10/13/2021     Lab Results   Component Value Date    TRIG 119 05/10/2023    TRIG 115 03/07/2023    TRIG 103 10/13/2021     Lab Results   Component Value Date    LDLCALC 104 (H) 05/10/2023     Lab Results   Component Value Date    [id number] 1 550 03/18/2021         Review of Systems   Constitutional: Positive for unexpected weight change (8 lb weight gain from 03/2023 )  Negative for appetite change, chills, fatigue and fever  HENT: Positive for hearing loss (bilateral hearing aids  )  Negative for congestion, ear pain, postnasal drip, rhinorrhea, sore throat and trouble swallowing  Prior ENT evaluation for recurrent sore throat LPR   Eyes: Negative for visual disturbance  Respiratory: Negative for cough, shortness of breath and wheezing  Cardiovascular: Negative for chest pain, palpitations and leg swelling  History of DVT left leg treated with Eliquis  No recurrence  No FH venous thrombosis  Gastrointestinal: Negative for abdominal pain, blood in stool, constipation, diarrhea, nausea and vomiting  GERD stable on Famotidine 20 mg daily  She uses  prn  Omeprazole 20 mg  No dysphagia  No history of Mauro's  04/2021 EGD Normal except for hiatal hernia  04/2021 colonoscopy normal except for mild diverticulosis sigmoid colon   Endocrine:        Lab Results       Component                Value               Date                       [id number]             1 550               03/18/2021               Genitourinary: Negative for difficulty urinating  Musculoskeletal: Negative for arthralgias and myalgias  Skin: Negative for rash  Neurological: Negative for dizziness and headaches  Hematological: Negative for adenopathy  Does not bruise/bleed easily  Psychiatric/Behavioral: Negative for dysphoric mood and sleep disturbance         Past Medical History:   Diagnosis Date   • Acid reflux    • DVT (deep venous thrombosis) ([facility] [address] ) 01/2018   • Hearing loss    • Hypercholesterolemia    • Hypertension    • Impingement syndrome of left shoulder 7/12/2019   • Wears glasses      Past Surgical History:   Procedure Laterality Date   • BREAST BIOPSY Left     1995 left, 1997 unknown side   • COLONOSCOPY     • TONSILLECTOMY     • TUBAL LIGATION     • UPPER GASTROINTESTINAL ENDOSCOPY     • WISDOM TOOTH EXTRACTION  1962     Family History   Problem Relation Age of Onset   • Diabetes Mother    • Hypertension Mother    • Cancer Father    • Diabetes Father    • Hypertension Father    • Colon cancer Father 72   • Congenital heart disease Daughter    • Hyperlipidemia Other         pure   • Lymphoma Sister 72   • Lung cancer Sister 62   • Skin cancer Brother 72        Melanoma   • No Known Problems Maternal Grandmother    • No Known Problems Maternal Grandfather    • No Known Problems Paternal Grandmother    • No Known Problems Paternal Grandfather    • Prostate cancer Brother 72   • No Known Problems Daughter    • Breast cancer Other 54     Social History     Socioeconomic History   • Marital status: /Civil Union     Spouse name: Not on file   • Number of children: Not on file   • Years of education: Not on file   • Highest education level: Not on file   Occupational History   • Not on file   Tobacco Use   • Smoking status: Never     Passive exposure: Never   • Smokeless tobacco: Never   Vaping Use   • Vaping Use: Never used   Substance and Sexual Activity   • Alcohol use: Yes     Comment: rare   • Drug use: No   • Sexual activity: Not Currently     Partners: Male     Birth control/protection: Post-menopausal, None   Other Topics Concern   • Not on file   Social History Narrative   • Not on file     Social Determinants of Health     Financial Resource Strain: Low Risk    • Difficulty of Paying Living Expenses: Not hard at all   Food Insecurity: Not on file   Transportation Needs: No Transportation Needs   • Lack of Transportation (Medical): No   • Lack of Transportation (Non-Medical):  No   Physical Activity: Not on file   Stress: Not on file   Social Connections: Not on file   Intimate Partner Violence: Not on file   Housing Stability: Not on file     Current Outpatient Medications on File Prior to Visit   Medication Sig   • amLODIPine (NORVASC) 5 mg tablet Take 1 tablet (5 mg total) by mouth 2 (two) times a day   • Ascorbic Acid (VITAMIN C) 500 MG CAPS Take 1 capsule by mouth daily   • Calcium Carbonate-Vitamin D3 600-400 MG-UNIT TABS Take by mouth in the morning   • Cholecalciferol (VITAMIN D) 2000 units tablet Take by mouth daily   • famotidine (PEPCID) 20 mg tablet Take 1 tablet (20 mg total) by mouth every morning   • folic acid (FOLVITE) 980 MCG tablet Take 1 tablet by mouth daily   • hydrochlorothiazide (HYDRODIURIL) 25 mg tablet Take 1 tablet (25 mg total) by mouth daily   • omeprazole (PriLOSEC) 20 mg delayed release capsule Take 1 capsule (20 mg total) by mouth daily (Patient taking differently: Take 20 mg by mouth if needed)   • potassium chloride (K-DUR,KLOR-CON) 10 mEq tablet Take 1 tablet (10 mEq total) by mouth daily   • prednisoLONE acetate (PRED FORTE) 1 % ophthalmic suspension    • valsartan (DIOVAN) 160 mg tablet Take 1 tablet (160 mg total) by mouth daily   • [DISCONTINUED] simvastatin (ZOCOR) 40 mg tablet Take 1 tablet (40 mg total) by mouth daily   • meclizine (ANTIVERT) 25 mg tablet Take 1 tablet (25 mg total) by mouth every 8 (eight) hours as needed for dizziness (Patient not taking: Reported on 5/17/2023)     Allergies   Allergen Reactions   • Sulfa Antibiotics Other (See Comments)     Unknown reaction    • Motrin [Ibuprofen] GI Intolerance     If on prescription strength, over the counter she does fine with     Immunization History   Administered Date(s) "Administered   • COVID-19 PFIZER VACCINE 0 3 ML IM 2021, 2021   • Hep A, adult 10/05/2019, 10/21/2019   • INFLUENZA 2011, 2012, 10/21/2013, 10/03/2014, 10/08/2015, 10/18/2016, 2017, 10/09/2018, 2019   • Influenza, high dose seasonal 0 7 mL 10/28/2020, 2021   • Pneumococcal Conjugate 13-Valent 2015, 2017   • Pneumococcal Polysaccharide PPV23 10/17/2007   • Tdap 2019   • Zoster 2009   • Zoster Vaccine Recombinant 2019, 2020, 2020       Objective     /80 (BP Location: Left arm, Patient Position: Sitting, Cuff Size: Large)   Pulse (!) 54   Temp 98 °F (36 7 °C)   Ht 5' 4\" (1 626 m)   SpO2 98%   BMI 36 05 kg/m²     BP Readings from Last 3 Encounters:   23 138/80   05/10/23 168/68   03/15/23 132/80     Wt Readings from Last 3 Encounters:   23 95 3 kg (210 lb)   05/10/23 95 3 kg (210 lb)   03/15/23 91 6 kg (202 lb)         Physical Exam  Vitals and nursing note reviewed  Constitutional:       General: She is not in acute distress  Appearance: She is well-developed  Eyes:      General: No scleral icterus  Extraocular Movements: Extraocular movements intact  Right eye: No nystagmus  Left eye: No nystagmus  Conjunctiva/sclera: Conjunctivae normal       Pupils: Pupils are equal, round, and reactive to light  Neck:      Thyroid: No thyroid mass or thyromegaly  Vascular: No carotid bruit or JVD  Trachea: No tracheal deviation  Cardiovascular:      Rate and Rhythm: Normal rate and regular rhythm  Heart sounds: Normal heart sounds  No murmur heard  No gallop  Pulmonary:      Effort: Pulmonary effort is normal  No respiratory distress  Breath sounds: Normal breath sounds  No wheezing or rales  Musculoskeletal:      Right lower le+ Pitting Edema present  Left lower le+ Pitting Edema present  Lymphadenopathy:      Cervical: No cervical adenopathy        Upper " Body:      Right upper body: No supraclavicular adenopathy  Left upper body: No supraclavicular adenopathy  Skin:     Findings: No rash  Nails: There is no clubbing  Neurological:      General: No focal deficit present  Mental Status: She is alert and oriented to person, place, and time  Cranial Nerves: No cranial nerve deficit        Gait: Gait normal    Psychiatric:         Mood and Affect: Mood normal          Behavior: Behavior normal        Matilda Lu MD

## 2023-05-26 DIAGNOSIS — E78.00 PURE HYPERCHOLESTEROLEMIA: ICD-10-CM

## 2023-05-26 RX ORDER — SIMVASTATIN 20 MG
20 TABLET ORAL DAILY
Qty: 90 TABLET | Refills: 3 | Status: CANCELLED | OUTPATIENT
Start: 2023-05-26

## 2023-05-26 RX ORDER — SIMVASTATIN 20 MG
20 TABLET ORAL DAILY
Qty: 90 TABLET | Refills: 3 | Status: SHIPPED | OUTPATIENT
Start: 2023-05-26

## 2023-05-31 ENCOUNTER — OFFICE VISIT (OUTPATIENT)
Dept: CARDIOLOGY CLINIC | Facility: CLINIC | Age: 83
End: 2023-05-31

## 2023-05-31 VITALS
WEIGHT: 199.3 LBS | HEART RATE: 46 BPM | DIASTOLIC BLOOD PRESSURE: 78 MMHG | HEIGHT: 64 IN | SYSTOLIC BLOOD PRESSURE: 160 MMHG | BODY MASS INDEX: 34.02 KG/M2

## 2023-05-31 DIAGNOSIS — Z86.718 HISTORY OF DVT OF LOWER EXTREMITY: ICD-10-CM

## 2023-05-31 DIAGNOSIS — R00.1 BRADYCARDIA: ICD-10-CM

## 2023-05-31 DIAGNOSIS — I10 ESSENTIAL HYPERTENSION: Primary | ICD-10-CM

## 2023-05-31 NOTE — PROGRESS NOTES
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   b  Last echo 23 with EF 55% mild regurg of the TV and MV valves and ,mildly dilated LA   c  Seen by EP    d  HR have been in the 50's but patient remained largely asx and was able to exercise without complaints   e  PPM not warranted during that time   f  Las TSH was normal but has not had blood work done since    g  EKG here shows sinus bradycardia with rates 46 bpm   2  Hx of DVT   a  provoked by drive to [place] in    b  Completed 6 months of eliquis   c  Currently not on anticoagulation   3  Essential Hypertension   a  On amlodipine 5 mg and Valsartan 80 mg daily previously   b  Valsartan increased to 160 mg during recent hospital stay earlier in the month   c  Reports she takes her pressures at home daily and they are 130's   d  Would like to avoid med increase if she can  4  BPPV         A  dx'ed during hospital stay earlier this month         B  On meclizine prn but has not needed to use  IMPRESSION:  Patient is doing better from a vertigo perspective  Filled her meclazine but has not needed  As far as bradycardia, this is chronic and she has been working out at the [employer] recently and has been completely asx  No indication for pacer currently  As far as her bp her pressures at home are 130's over 80  bp here was 160's  Could be white-coat HTN  Will ask her to take bp BID and report back with 2 weeks  We can increase valsartan to 320 mg if these readings are consistently high  I have asked her to get her cuff at home calibrated as well to ensure accuracy   From a bp standpoint she has no symptoms  Would avoid increasing amlodipine due to LE edema  Patient is to follow up in our EP office in 1 year  She is to call our office with any further questions or concerns in the meantime  HPI:   Interim history: Vannesa Guillaume is a [de-identified] y o  female with history of DVT, HTN, medication induced bradycardia, lower extremity edema  She presents today for follow up of her bradycardia and elevated blood pressures  Patient originally is a patient of Dr Brenda Rodarte, however has been seen by my colleague, Catalina Dakin PA-C, the last visit in 2021  She has been following with EP for bradycardia, which according to record review was medication induced from beta blockers  She maintains a normal heart rate in the 40's and 50's but is largely asx from it during the time of her last visit and was able to exercise without any symptoms  She was not recommended for pacer at that time due to her lack of symptoms  She has a history of DVT in the past in 2018 which was provoked by drive to [employer]  She completed 6 months of anticoagulation and has not been on [facility] since coming off of eliquis  She has also followed with out group for blood pressure control  She had renal disease ruled out by u/s in 2016 which was negative for renal artery stenosis  She has been maintained on amlodipine and valsartan and her blood pressure readings at home she states are well controlled  Her valsartan was recently increased from 80 mg to 160 after her recent hospital stay for BPPV  bp in the office today was elevated at 160/80 however she tells me her readings frequently at home are 130's/80's  From a bradycardia standpoint she is still without significant symptoms at this time  ROS:   Review of Systems   Constitutional: Negative for fever and malaise/fatigue  HENT: Negative for hearing loss and tinnitus      Cardiovascular: Negative for chest pain, dyspnea on exertion, irregular heartbeat, near-syncope, palpitations and syncope  Gastrointestinal: Negative for nausea and vomiting  Neurological: Negative for dizziness, headaches, light-headedness, loss of balance, numbness and paresthesias  All other systems reviewed and are negative  OBJECTIVE:   Vitals: There were no vitals taken for this visit  Physical Exam  Vitals and nursing note reviewed  Constitutional:       General: She is not in acute distress  Cardiovascular:      Rate and Rhythm: Regular rhythm  Bradycardia present  Pulmonary:      Effort: Pulmonary effort is normal  No respiratory distress  Breath sounds: Normal breath sounds  Abdominal:      General: Abdomen is flat  There is no distension  Palpations: Abdomen is soft  Musculoskeletal:         General: Swelling present  Right lower leg: Edema present  Left lower leg: Edema present  Skin:     General: Skin is warm and dry  Coloration: Skin is not jaundiced  Neurological:      General: No focal deficit present  Mental Status: She is alert and oriented to person, place, and time     Psychiatric:         Mood and Affect: Mood normal          Medications:      Current Outpatient Medications:   •  amLODIPine (NORVASC) 5 mg tablet, Take 1 tablet (5 mg total) by mouth 2 (two) times a day, Disp: 180 tablet, Rfl: 3  •  Ascorbic Acid (VITAMIN C) 500 MG CAPS, Take 1 capsule by mouth daily, Disp: , Rfl:   •  Calcium Carbonate-Vitamin D3 600-400 MG-UNIT TABS, Take by mouth in the morning, Disp: , Rfl:   •  Cholecalciferol (VITAMIN D) 2000 units tablet, Take by mouth daily, Disp: , Rfl:   •  famotidine (PEPCID) 20 mg tablet, Take 1 tablet (20 mg total) by mouth every morning, Disp: 90 tablet, Rfl: 3  •  folic acid (FOLVITE) 404 MCG tablet, Take 1 tablet by mouth daily, Disp: , Rfl:   •  hydrochlorothiazide (HYDRODIURIL) 25 mg tablet, Take 1 tablet (25 mg total) by mouth daily, Disp: 90 tablet, Rfl: 3  •  meclizine (ANTIVERT) 25 mg tablet, Take 1 tablet (25 mg total) by mouth every 8 (eight) hours as needed for dizziness (Patient not taking: Reported on 5/17/2023), Disp: 30 tablet, Rfl: 0  •  omeprazole (PriLOSEC) 20 mg delayed release capsule, Take 1 capsule (20 mg total) by mouth daily (Patient taking differently: Take 20 mg by mouth if needed), Disp: 90 capsule, Rfl: 3  •  potassium chloride (K-DUR,KLOR-CON) 10 mEq tablet, Take 1 tablet (10 mEq total) by mouth daily, Disp: 90 tablet, Rfl: 3  •  prednisoLONE acetate (PRED FORTE) 1 % ophthalmic suspension, , Disp: , Rfl:   •  simvastatin (ZOCOR) 20 mg tablet, Take 1 tablet (20 mg total) by mouth daily, Disp: 90 tablet, Rfl: 3  •  valsartan (DIOVAN) 160 mg tablet, Take 1 tablet (160 mg total) by mouth daily, Disp: 30 tablet, Rfl: 1     Family History   Problem Relation Age of Onset   • Diabetes Mother    • Hypertension Mother    • Cancer Father    • Diabetes Father    • Hypertension Father    • Colon cancer Father 72   • Congenital heart disease Daughter    • Hyperlipidemia Other         pure   • Lymphoma Sister 72   • Lung cancer Sister 62   • Skin cancer Brother 72        Melanoma   • No Known Problems Maternal Grandmother    • No Known Problems Maternal Grandfather    • No Known Problems Paternal Grandmother    • No Known Problems Paternal Grandfather    • Prostate cancer Brother 72   • No Known Problems Daughter    • Breast cancer Other 54     Social History     Socioeconomic History   • Marital status: /Civil Union     Spouse name: Not on file   • Number of children: Not on file   • Years of education: Not on file   • Highest education level: Not on file   Occupational History   • Not on file   Tobacco Use   • Smoking status: Never     Passive exposure: Never   • Smokeless tobacco: Never   Vaping Use   • Vaping Use: Never used   Substance and Sexual Activity   • Alcohol use: Yes     Comment: rare   • Drug use: No   • Sexual activity: Not Currently     Partners: Male     Birth control/protection: Post-menopausal, None   Other Topics Concern   • Not on file   Social History Narrative   • Not on file     Social Determinants of Health     Financial Resource Strain: Low Risk  (8/24/2022)    Overall Financial Resource Strain (CARDIA)    • Difficulty of Paying Living Expenses: Not hard at all   Food Insecurity: Not on file   Transportation Needs: No Transportation Needs (8/24/2022)    PRAPARE - Transportation    • Lack of Transportation (Medical): No    • Lack of Transportation (Non-Medical): No   Physical Activity: Not on file   Stress: Not on file   Social Connections: Not on file   Intimate Partner Violence: Not on file   Housing Stability: Not on file     Social History     Tobacco Use   Smoking Status Never   • Passive exposure: Never   Smokeless Tobacco Never     Social History     Substance and Sexual Activity   Alcohol Use Yes    Comment: rare       Labs & Results:  Below is the patient's most recent value for Albumin, ALT, AST, BUN, Calcium, Chloride, Cholesterol, CO2, Creatinine, GFR, Glucose, HDL, Hematocrit, Hemoglobin, Hemoglobin A1C, LDL, Magnesium, Phosphorus, Platelets, Potassium, PSA, Sodium, Triglycerides, and WBC  Lab Results   Component Value Date    ALT 15 05/09/2023    AST 23 05/09/2023    BUN 15 05/09/2023    CALCIUM 9 0 05/09/2023     05/09/2023    CO2 21 05/09/2023    CREATININE 0 76 05/09/2023    HCT 46 9 (H) 05/09/2023    HDL 62 05/10/2023    HGB 15 3 05/09/2023    HGBA1C 5 7 (H) 05/10/2023    K 3 2 (L) 05/09/2023     05/10/2023    TRIG 119 05/10/2023    WBC 6 41 05/09/2023     Note: for a comprehensive list of the patient's lab results, access the Results Review activity  CARDIAC TESTING:   ECHO:   No results found for this or any previous visit  No results found for this or any previous visit  CATH:  No results found for this or any previous visit  STRESS TEST:  No results found for this or any previous visit

## 2023-06-01 ENCOUNTER — ANNUAL EXAM (OUTPATIENT)
Dept: OBGYN CLINIC | Facility: CLINIC | Age: 83
End: 2023-06-01

## 2023-06-01 VITALS
BODY MASS INDEX: 34.04 KG/M2 | WEIGHT: 199.4 LBS | DIASTOLIC BLOOD PRESSURE: 64 MMHG | SYSTOLIC BLOOD PRESSURE: 128 MMHG | HEIGHT: 64 IN

## 2023-06-01 DIAGNOSIS — Z12.31 ENCOUNTER FOR SCREENING MAMMOGRAM FOR BREAST CANCER: ICD-10-CM

## 2023-06-01 DIAGNOSIS — Z01.419 ENCOUNTER FOR ANNUAL ROUTINE GYNECOLOGICAL EXAMINATION: Primary | ICD-10-CM

## 2023-06-01 NOTE — PROGRESS NOTES
Assessment/Plan:  Pap smear deferred due to low risk status  Encourage self breast examination as well as calcium supplementation  Continue annual mammogram   She will continue to follow-up with her breast specialist as scheduled  Reviewed colon cancer screening, up-to-date  She will continue to follow-up with primary care as scheduled  Reviewed GYN care  Patient prefers exam every year  All questions answered  No problem-specific Assessment & Plan notes found for this encounter  Diagnoses and all orders for this visit:    Encounter for annual routine gynecological examination    Encounter for screening mammogram for breast cancer          Subjective:      Patient ID: Tiesha Aleman is a [de-identified] y o  female  HPI     This is a very pleasant 29-year-old female P3 ( x3) presents for GYN exam   She offers no complaints today  She went through menopause at age 48  She was on hormones for approximately 2 years  She denies any vaginal bleeding or spotting  No changes in bowel or bladder function  She has been in a monogamous relationship with her  for over 48 years  Pap smears were all normal   She continues to follow-up with the breast specialist on an annual basis, history of fibrocystic breasts  Colonoscopy 2021 with follow-up in 5 years  Patient was hospitalized for benign vertigo with extensive work-up including MRI, CAT scan, echocardiogram all within normal limits  She does follow-up with cardiology  The following portions of the patient's history were reviewed and updated as appropriate: allergies, current medications, past family history, past medical history, past social history, past surgical history and problem list     Review of Systems   Constitutional: Negative for fatigue, fever and unexpected weight change  Respiratory: Negative for cough, chest tightness, shortness of breath and wheezing  Cardiovascular: Negative  Negative for chest pain and palpitations  "  Gastrointestinal: Negative  Negative for abdominal distention, abdominal pain, blood in stool, constipation, diarrhea, nausea and vomiting  Genitourinary: Negative  Negative for difficulty urinating, dyspareunia, dysuria, flank pain, frequency, genital sores, hematuria, pelvic pain, urgency, vaginal bleeding, vaginal discharge and vaginal pain  Skin: Negative for rash  Objective:      /64   Ht 5' 4\" (1 626 m)   Wt 90 4 kg (199 lb 6 4 oz)   BMI 34 23 kg/m²          Physical Exam  Constitutional:       Appearance: Normal appearance  She is well-developed  Cardiovascular:      Rate and Rhythm: Normal rate and regular rhythm  Pulmonary:      Effort: Pulmonary effort is normal       Breath sounds: Normal breath sounds  Chest:   Breasts:     Right: No inverted nipple, mass, nipple discharge, skin change or tenderness  Left: No inverted nipple, mass, nipple discharge, skin change or tenderness  Abdominal:      General: Bowel sounds are normal  There is no distension  Palpations: Abdomen is soft  Tenderness: There is no abdominal tenderness  There is no guarding or rebound  Genitourinary:     Labia:         Right: No rash, tenderness or lesion  Left: No rash, tenderness or lesion  Vagina: Normal  No signs of injury  No vaginal discharge or tenderness  Cervix: No cervical motion tenderness, discharge, friability, lesion, erythema or cervical bleeding  Uterus: Not enlarged and not tender  Adnexa:         Right: No mass, tenderness or fullness  Left: No mass, tenderness or fullness  Neurological:      Mental Status: She is alert and oriented to person, place, and time     Psychiatric:         Behavior: Behavior normal          "

## 2023-06-29 DIAGNOSIS — I10 ESSENTIAL HYPERTENSION: ICD-10-CM

## 2023-06-29 RX ORDER — VALSARTAN 160 MG/1
160 TABLET ORAL DAILY
Qty: 90 TABLET | Refills: 1 | Status: SHIPPED | OUTPATIENT
Start: 2023-06-29

## 2023-06-30 ENCOUNTER — OFFICE VISIT (OUTPATIENT)
Dept: FAMILY MEDICINE CLINIC | Facility: CLINIC | Age: 83
End: 2023-06-30
Payer: MEDICARE

## 2023-06-30 VITALS
OXYGEN SATURATION: 96 % | HEIGHT: 64 IN | DIASTOLIC BLOOD PRESSURE: 82 MMHG | TEMPERATURE: 100.9 F | BODY MASS INDEX: 34.23 KG/M2 | SYSTOLIC BLOOD PRESSURE: 146 MMHG | HEART RATE: 82 BPM

## 2023-06-30 DIAGNOSIS — I10 ESSENTIAL HYPERTENSION: ICD-10-CM

## 2023-06-30 DIAGNOSIS — L02.212 CUTANEOUS ABSCESS OF BACK EXCLUDING BUTTOCKS: Primary | ICD-10-CM

## 2023-06-30 PROCEDURE — 87205 SMEAR GRAM STAIN: CPT | Performed by: FAMILY MEDICINE

## 2023-06-30 PROCEDURE — 87070 CULTURE OTHR SPECIMN AEROBIC: CPT | Performed by: FAMILY MEDICINE

## 2023-06-30 RX ORDER — CEPHALEXIN 500 MG/1
500 CAPSULE ORAL
Qty: 14 CAPSULE | Refills: 0 | Status: SHIPPED | OUTPATIENT
Start: 2023-06-30 | End: 2023-07-07

## 2023-06-30 NOTE — PROGRESS NOTES
"Name: Donis Dickens      : 1940      MRN: [id number]  Encounter Provider: Mague Weeks MD  Encounter Date: 2023   Encounter department: [address] [place] [address] St     1  Cutaneous abscess of back excluding buttocks  -     cephalexin (KEFLEX) 500 mg capsule; Take 1 capsule (500 mg total) by mouth 2 (two) times daily after meals for 7 days  -     Wound culture and Gram stain  -     Incision and Drainage    2  Essential hypertension    Start antibiotics pending culture  Patient is leaving on vacation tomorrow  Instructed to seek medical care if condition worsen while on vacation      A significant but separately identifiable additional service rendered during the encounter-I and D abscess see procedure note         Subjective     \"boil\" developed 2 days ago  Mild pain lying on her back  Low grade T  No drainage  Patient has been using topical Neosporin  Current medications reviewed    Review of Systems   Constitutional: Positive for fever  Negative for appetite change and chills  Respiratory: Negative for shortness of breath  Cardiovascular: Negative for chest pain and palpitations  Gastrointestinal: Negative for nausea and vomiting  Musculoskeletal: Negative for myalgias     Skin:        See HPI        Past Medical History:   Diagnosis Date   • Acid reflux    • BPPV (benign paroxysmal positional vertigo)    • [facility]   • Hearing loss    • Hypercholesterolemia    • Hypertension    • Impingement syndrome of left shoulder 2019   • Wears glasses      Past Surgical History:   Procedure Laterality Date   • BREAST BIOPSY Left      left,  unknown side   • COLONOSCOPY     • TONSILLECTOMY     • TUBAL LIGATION     • UPPER GASTROINTESTINAL ENDOSCOPY     • WISDOM TOOTH EXTRACTION       Family History   Problem Relation Age of Onset   • Diabetes Mother    • Hypertension Mother    • Cancer Father    • Diabetes Father    • " Hypertension Father    • Colon cancer Father 72   • Congenital heart disease Daughter    • Hyperlipidemia Other         pure   • Lymphoma Sister 72   • Lung cancer Sister 62   • Skin cancer Brother 72        Melanoma   • No Known Problems Maternal Grandmother    • No Known Problems Maternal Grandfather    • No Known Problems Paternal Grandmother    • No Known Problems Paternal Grandfather    • Prostate cancer Brother 72   • No Known Problems Daughter    • Breast cancer Other 54     Social History     Socioeconomic History   • Marital status: /Civil Union     Spouse name: Not on file   • Number of children: Not on file   • Years of education: Not on file   • Highest education level: Not on file   Occupational History   • Not on file   Tobacco Use   • Smoking status: Never     Passive exposure: Never   • Smokeless tobacco: Never   Vaping Use   • Vaping Use: Never used   Substance and Sexual Activity   • Alcohol use: Yes     Comment: rare   • Drug use: No   • Sexual activity: Yes     Partners: Male     Birth control/protection: Post-menopausal, None   Other Topics Concern   • Not on file   Social History Narrative   • Not on file     Social Determinants of Health     Financial Resource Strain: Low Risk  (8/24/2022)    Overall Financial Resource Strain (CARDIA)    • Difficulty of Paying Living Expenses: Not hard at all   Food Insecurity: Not on file   Transportation Needs: No Transportation Needs (8/24/2022)    PRAPARE - Transportation    • Lack of Transportation (Medical): No    • Lack of Transportation (Non-Medical):  No   Physical Activity: Not on file   Stress: Not on file   Social Connections: Not on file   Intimate Partner Violence: Not on file   Housing Stability: Not on file     Current Outpatient Medications on File Prior to Visit   Medication Sig   • amLODIPine (NORVASC) 5 mg tablet Take 1 tablet (5 mg total) by mouth 2 (two) times a day   • Ascorbic Acid (VITAMIN C) 500 MG CAPS Take 1 capsule by mouth "daily   • Calcium Carbonate-Vitamin D3 600-400 MG-UNIT TABS Take by mouth in the morning   • Cholecalciferol (VITAMIN D) 2000 units tablet Take by mouth daily   • famotidine (PEPCID) 20 mg tablet Take 1 tablet (20 mg total) by mouth every morning   • folic acid (FOLVITE) 880 MCG tablet Take 1 tablet by mouth daily   • hydrochlorothiazide (HYDRODIURIL) 25 mg tablet Take 1 tablet (25 mg total) by mouth daily   • omeprazole (PriLOSEC) 20 mg delayed release capsule Take 1 capsule (20 mg total) by mouth daily (Patient taking differently: Take 20 mg by mouth if needed)   • potassium chloride (K-DUR,KLOR-CON) 10 mEq tablet Take 1 tablet (10 mEq total) by mouth daily   • prednisoLONE acetate (PRED FORTE) 1 % ophthalmic suspension    • simvastatin (ZOCOR) 20 mg tablet Take 1 tablet (20 mg total) by mouth daily   • valsartan (DIOVAN) 160 mg tablet Take 1 tablet (160 mg total) by mouth daily     Allergies   Allergen Reactions   • Sulfa Antibiotics Other (See Comments)     Unknown reaction    • Motrin [Ibuprofen] GI Intolerance     If on prescription strength, over the counter she does fine with     Immunization History   Administered Date(s) Administered   • COVID-19 PFIZER VACCINE 0 3 ML IM 02/28/2021, 03/21/2021   • Hep A, adult 10/05/2019, 10/21/2019   • INFLUENZA 09/26/2011, 09/17/2012, 10/21/2013, 10/03/2014, 10/08/2015, 10/18/2016, 09/19/2017, 10/09/2018, 09/26/2019   • Influenza, high dose seasonal 0 7 mL 10/28/2020, 11/03/2021   • Pneumococcal Conjugate 13-Valent 12/16/2015, 09/19/2017   • Pneumococcal Polysaccharide PPV23 10/17/2007   • Tdap 09/19/2019   • Zoster 03/18/2009   • Zoster Vaccine Recombinant 09/05/2019, 01/20/2020, 03/12/2020       Objective     /82 (BP Location: Left arm, Patient Position: Sitting, Cuff Size: Large)   Pulse 82   Temp (!) 100 9 °F (38 3 °C)   Ht 5' 4\" (1 626 m)   SpO2 96%   BMI 34 23 kg/m²     Physical Exam  Constitutional:       General: She is not in acute " distress  Cardiovascular:      Rate and Rhythm: Normal rate and regular rhythm  Heart sounds: No murmur heard  No gallop  Pulmonary:      Effort: Pulmonary effort is normal  No respiratory distress  Breath sounds: Normal breath sounds  No wheezing or rales  Skin:     Comments: Skin abscess lower back centrally area of fluctuance with surrounding induration and redness  Neurological:      Mental Status: She is alert and oriented to person, place, and time  Psychiatric:         Mood and Affect: Mood normal            Incision and Drainage    Date/Time: 6/30/2023 3:30 PM    Performed by: Russell Woo MD  Authorized by: Russell Woo MD  Universal Protocol:  Consent: Verbal consent obtained  Risks and benefits: risks, benefits and alternatives were discussed  Consent given by: patient      Patient location:  Clinic  Location:     Type:  Abscess    Location:  Trunk    Trunk location:  Back (lower back )  Pre-procedure details:     Skin preparation:  Betadine  Anesthesia (see MAR for exact dosages): Anesthesia method: Ethyl chloride topically   Procedure details:     Complexity:  Simple    Needle aspiration: no      Incision types:  Stab incision    Scalpel size: 18 G needle     Approach:  Puncture    Incision depth:  Subcutaneous    Drainage:  Purulent and bloody    Drainage amount:  Scant    Wound treatment:  Wound left open    Packing materials:  None  Post-procedure details:     Patient tolerance of procedure: Tolerated well, no immediate complications  Comments:      Wound culture obtained              Russell Woo MD

## 2023-07-02 LAB
BACTERIA WND AEROBE CULT: NO GROWTH
GRAM STN SPEC: NORMAL

## 2023-07-03 LAB
BACTERIA WND AEROBE CULT: NO GROWTH
GRAM STN SPEC: NORMAL

## 2023-07-05 ENCOUNTER — TELEPHONE (OUTPATIENT)
Dept: FAMILY MEDICINE CLINIC | Facility: CLINIC | Age: 83
End: 2023-07-05

## 2023-07-24 DIAGNOSIS — K21.9 GASTROESOPHAGEAL REFLUX DISEASE WITHOUT ESOPHAGITIS: ICD-10-CM

## 2023-07-24 RX ORDER — OMEPRAZOLE 20 MG/1
20 CAPSULE, DELAYED RELEASE ORAL DAILY
Qty: 90 CAPSULE | Refills: 3 | Status: SHIPPED | OUTPATIENT
Start: 2023-07-24

## 2023-07-24 RX ORDER — OMEPRAZOLE 20 MG/1
20 CAPSULE, DELAYED RELEASE ORAL DAILY
Qty: 90 CAPSULE | Refills: 3 | OUTPATIENT
Start: 2023-07-24

## 2023-08-08 ENCOUNTER — TELEPHONE (OUTPATIENT)
Dept: FAMILY MEDICINE CLINIC | Facility: CLINIC | Age: 83
End: 2023-08-08

## 2023-08-08 NOTE — TELEPHONE ENCOUNTER
Pt's sister is her hairdresser and saw her on 8/1/2023. Her sister is admitted to [facility] with pneumonia and Covid -23. What are the restrictions she needs to follow? She is totally symptom free at this time. She will be hosting a [employer] at her home this Saturday. Can she still do this?

## 2023-08-10 ENCOUNTER — APPOINTMENT (OUTPATIENT)
Dept: LAB | Facility: CLINIC | Age: 83
End: 2023-08-10
Payer: MEDICARE

## 2023-08-10 LAB
ALBUMIN SERPL BCP-MCNC: 3.5 G/DL (ref 3.5–5)
ALP SERPL-CCNC: 84 U/L (ref 46–116)
ALT SERPL W P-5'-P-CCNC: 31 U/L (ref 12–78)
ANION GAP SERPL CALCULATED.3IONS-SCNC: 5 MMOL/L
AST SERPL W P-5'-P-CCNC: 31 U/L (ref 5–45)
BASOPHILS # BLD AUTO: 0.04 THOUSANDS/ÂΜL (ref 0–0.1)
BASOPHILS NFR BLD AUTO: 1 % (ref 0–1)
BILIRUB SERPL-MCNC: 0.58 MG/DL (ref 0.2–1)
BUN SERPL-MCNC: 19 MG/DL (ref 5–25)
CALCIUM SERPL-MCNC: 9.3 MG/DL (ref 8.3–10.1)
CHLORIDE SERPL-SCNC: 111 MMOL/L (ref 96–108)
CHOLEST SERPL-MCNC: 192 MG/DL
CO2 SERPL-SCNC: 26 MMOL/L (ref 21–32)
CREAT SERPL-MCNC: 0.92 MG/DL (ref 0.6–1.3)
EOSINOPHIL # BLD AUTO: 0.11 THOUSAND/ÂΜL (ref 0–0.61)
EOSINOPHIL NFR BLD AUTO: 2 % (ref 0–6)
ERYTHROCYTE [DISTWIDTH] IN BLOOD BY AUTOMATED COUNT: 13.8 % (ref 11.6–15.1)
GFR SERPL CREATININE-BSD FRML MDRD: 58 ML/MIN/1.73SQ M
GLUCOSE P FAST SERPL-MCNC: 103 MG/DL (ref 65–99)
HCT VFR BLD AUTO: 45.1 % (ref 34.8–46.1)
HDLC SERPL-MCNC: 67 MG/DL
HGB BLD-MCNC: 14.4 G/DL (ref 11.5–15.4)
IMM GRANULOCYTES # BLD AUTO: 0.02 THOUSAND/UL (ref 0–0.2)
IMM GRANULOCYTES NFR BLD AUTO: 0 % (ref 0–2)
LDLC SERPL CALC-MCNC: 110 MG/DL (ref 0–100)
LYMPHOCYTES # BLD AUTO: 1.71 THOUSANDS/ÂΜL (ref 0.6–4.47)
LYMPHOCYTES NFR BLD AUTO: 29 % (ref 14–44)
MCH RBC QN AUTO: 27.8 PG (ref 26.8–34.3)
MCHC RBC AUTO-ENTMCNC: 31.9 G/DL (ref 31.4–37.4)
MCV RBC AUTO: 87 FL (ref 82–98)
MONOCYTES # BLD AUTO: 0.63 THOUSAND/ÂΜL (ref 0.17–1.22)
MONOCYTES NFR BLD AUTO: 11 % (ref 4–12)
NEUTROPHILS # BLD AUTO: 3.38 THOUSANDS/ÂΜL (ref 1.85–7.62)
NEUTS SEG NFR BLD AUTO: 57 % (ref 43–75)
NONHDLC SERPL-MCNC: 125 MG/DL
NRBC BLD AUTO-RTO: 0 /100 WBCS
PLATELET # BLD AUTO: 226 THOUSANDS/UL (ref 149–390)
PMV BLD AUTO: 11.5 FL (ref 8.9–12.7)
POTASSIUM SERPL-SCNC: 4.7 MMOL/L (ref 3.5–5.3)
PROT SERPL-MCNC: 6.8 G/DL (ref 6.4–8.4)
RBC # BLD AUTO: 5.18 MILLION/UL (ref 3.81–5.12)
SODIUM SERPL-SCNC: 142 MMOL/L (ref 135–147)
TRIGL SERPL-MCNC: 76 MG/DL
TSH SERPL DL<=0.05 MIU/L-ACNC: 2.69 UIU/ML (ref 0.45–4.5)
WBC # BLD AUTO: 5.89 THOUSAND/UL (ref 4.31–10.16)

## 2023-08-16 ENCOUNTER — RA CDI HCC (OUTPATIENT)
Dept: OTHER | Facility: HOSPITAL | Age: 83
End: 2023-08-16

## 2023-08-16 NOTE — PROGRESS NOTES
[address] [facility] coding opportunities       Chart reviewed, no opportunity found: CHART REVIEWED, NO OPPORTUNITY FOUND        Patients Insurance     Medicare Insurance: Medicare

## 2023-08-23 NOTE — PROGRESS NOTES
Assessment and Plan:     Problem List Items Addressed This Visit        Digestive    Gastroesophageal reflux disease    Hiatal hernia       Cardiovascular and Mediastinum    Essential hypertension - Primary    Relevant Orders    Comprehensive metabolic panel    CBC and differential       Musculoskeletal and Integument    Osteoarthritis of glenohumeral joint, left    Osteoarthritis of left acromioclavicular joint       Other    Hyperlipidemia    Relevant Orders    Lipid panel   Other Visit Diagnoses     Prediabetes        Relevant Orders    Hemoglobin A1C    Impingement syndrome of left shoulder        Relevant Medications    methylPREDNISolone acetate (DEPO-MEDROL) injection 80 mg (Completed)    Other Relevant Orders    Large joint arthrocentesis: L subacromial bursa    Medicare annual wellness visit, subsequent            Continue with current medications. OV 6 months with labs. A significant but separately identifiable additional service rendered during the encounter steroid injection L shoulder-see procedure note. BMI Counseling: Body mass index is 34.07 kg/m². The BMI is above normal. Nutrition recommendations include decreasing portion sizes, consuming healthier snacks, moderation in carbohydrate intake, reducing intake of saturated and trans fat and reducing intake of cholesterol. Exercise recommendations include exercising 3-5 times per week. No pharmacotherapy was ordered. Rationale for BMI follow-up plan is due to patient being overweight or obese. Preventive health issues were discussed with patient, and age appropriate screening tests were ordered as noted in patient's After Visit Summary. Personalized health advice and appropriate referrals for health education or preventive services given if needed, as noted in patient's After Visit Summary. History of Present Illness:     Patient presents for a Medicare Wellness Visit    Follow up visit. Medications reviewed.   Hypertension blood pressures have been stable on Amlodipine 5 mg daily, Valsartan 80 mg daily and HCTZ 25 mg daily. 08/2023 creatinine 0.92. GFR 58. Electrolytes normal except for chloride 111. . K+ 4.7. on K+ supplement. Hgb 14.8  Hyperlipidemia on Simvastatin 20 mg/day- 08/2023  lipid profile cholesterol 192. Triglycerides 76 HDL 67. . LFTs normal.  . 05/2023 A1c 5.7 .       Recent Results (from the past 672 hour(s))   Comprehensive metabolic panel    Collection Time: 08/10/23 10:41 AM   Result Value Ref Range    Sodium 142 135 - 147 mmol/L    Potassium 4.7 3.5 - 5.3 mmol/L    Chloride 111 (H) 96 - 108 mmol/L    CO2 26 21 - 32 mmol/L    ANION GAP 5 mmol/L    BUN 19 5 - 25 mg/dL    Creatinine 0.92 0.60 - 1.30 mg/dL    Glucose, Fasting 103 (H) 65 - 99 mg/dL    Calcium 9.3 8.3 - 10.1 mg/dL    AST 31 5 - 45 U/L    ALT 31 12 - 78 U/L    Alkaline Phosphatase 84 46 - 116 U/L    Total Protein 6.8 6.4 - 8.4 g/dL    Albumin 3.5 3.5 - 5.0 g/dL    Total Bilirubin 0.58 0.20 - 1.00 mg/dL    eGFR 58 ml/min/1.73sq m   CBC and differential    Collection Time: 08/10/23 10:41 AM   Result Value Ref Range    WBC 5.89 4.31 - 10.16 Thousand/uL    RBC 5.18 (H) 3.81 - 5.12 Million/uL    Hemoglobin 14.4 11.5 - 15.4 g/dL    Hematocrit 45.1 34.8 - 46.1 %    MCV 87 82 - 98 fL    MCH 27.8 26.8 - 34.3 pg    MCHC 31.9 31.4 - 37.4 g/dL    RDW 13.8 11.6 - 15.1 %    MPV 11.5 8.9 - 12.7 fL    Platelets 368 102 - 166 Thousands/uL    nRBC 0 /100 WBCs    Neutrophils Relative 57 43 - 75 %    Immat GRANS % 0 0 - 2 %    Lymphocytes Relative 29 14 - 44 %    Monocytes Relative 11 4 - 12 %    Eosinophils Relative 2 0 - 6 %    Basophils Relative 1 0 - 1 %    Neutrophils Absolute 3.38 1.85 - 7.62 Thousands/µL    Immature Grans Absolute 0.02 0.00 - 0.20 Thousand/uL    Lymphocytes Absolute 1.71 0.60 - 4.47 Thousands/µL    Monocytes Absolute 0.63 0.17 - 1.22 Thousand/µL    Eosinophils Absolute 0.11 0.00 - 0.61 Thousand/µL    Basophils Absolute 0.04 0.00 - 0.10 Thousands/µL   Lipid panel    Collection Time: 08/10/23 10:41 AM   Result Value Ref Range    Cholesterol 192 See Comment mg/dL    Triglycerides 76 See Comment mg/dL    HDL, Direct 67 >=50 mg/dL    LDL Calculated 110 (H) 0 - 100 mg/dL    Non-HDL-Chol (CHOL-HDL) 125 mg/dl   TSH, 3rd generation with Free T4 reflex    Collection Time: 08/10/23 10:41 AM   Result Value Ref Range    TSH 3RD GENERATON 2.690 0.450 - 4.500 uIU/mL     Lab Results   Component Value Date    HGBA1C 5.7 (H) 05/10/2023           Patient Care Team:  Jessica Borja MD as PCP - General (Family Medicine)  MARIELOS Gavin Stage, DO     Review of Systems:     Review of Systems   Constitutional: Negative for appetite change, chills, fatigue, fever and unexpected weight change. HENT: Positive for hearing loss (bilateral hearing aids. ). Negative for congestion, ear pain, postnasal drip, rhinorrhea, sore throat and trouble swallowing. Prior ENT evaluation for recurrent sore throat LPR   Eyes: Negative for visual disturbance. Respiratory: Negative for cough, shortness of breath and wheezing. Cardiovascular: Negative for chest pain, palpitations and leg swelling. History of DVT left leg treated with Eliquis. No recurrence. No FH venous thrombosis. Gastrointestinal: Negative for abdominal pain, blood in stool, constipation, diarrhea, nausea and vomiting. GERD stable on Famotidine 20 mg daily. She uses  prn  Omeprazole 20 mg  No dysphagia. No history of Mauro's. 04/2021 EGD Normal except for hiatal hernia. 04/2021 colonoscopy normal except for mild diverticulosis sigmoid colon   Endocrine:        Lab Results       Component                Value               Date                       [id number]             1.550               03/18/2021               Genitourinary: Negative for difficulty urinating. Musculoskeletal: Positive for arthralgias. Negative for myalgias. Recurrent left shoulder pain.   No history of trauma or injury. Prior steroid injection 2020 with symptomatic improvement. 7/2019 x-rays left shoulder mild glenohumeral osteoarthritis. Moderate AC joint arthritis   Skin: Negative for rash. Neurological: Negative for dizziness and headaches. Hematological: Negative for adenopathy. Does not bruise/bleed easily. Psychiatric/Behavioral: Negative for dysphoric mood and sleep disturbance.         Problem List:     Patient Active Problem List   Diagnosis   • History of DVT of lower extremity   • Essential hypertension   • Fibrocystic breast changes   • Gastroesophageal reflux disease   • Hearing loss   • Hiatal hernia   • Hyperlipidemia   • Varicose veins of left lower extremity   • Bradycardia   • Obesity   • Vertigo   • Osteoarthritis of glenohumeral joint, left   • Osteoarthritis of left acromioclavicular joint      Past Medical and Surgical History:     Past Medical History:   Diagnosis Date   • Acid reflux    • BPPV (benign paroxysmal positional vertigo)    • DVT (deep venous thrombosis) ([address]) 01/2018   • Hearing loss    • Hypercholesterolemia    • Hypertension    • Impingement syndrome of left shoulder 07/12/2019   • Wears glasses      Past Surgical History:   Procedure Laterality Date   • BREAST BIOPSY Left     1995 left, 1997 unknown side   • COLONOSCOPY     • TONSILLECTOMY     • TUBAL LIGATION     • UPPER GASTROINTESTINAL ENDOSCOPY     • WISDOM TOOTH EXTRACTION  1962      Family History:     Family History   Problem Relation Age of Onset   • Diabetes Mother    • Hypertension Mother    • Cancer Father    • Diabetes Father    • Hypertension Father    • Colon cancer Father 72   • Congenital heart disease Daughter    • Hyperlipidemia Other         pure   • Lymphoma Sister 72   • Lung cancer Sister 62   • Skin cancer Brother 72        Melanoma   • No Known Problems Maternal Grandmother    • No Known Problems Maternal Grandfather    • No Known Problems Paternal Grandmother    • No Known Problems Paternal Grandfather    • Prostate cancer Brother 72   • No Known Problems Daughter    • Breast cancer Other 54      Social History:     Social History     Socioeconomic History   • Marital status: /Civil Union     Spouse name: None   • Number of children: None   • Years of education: None   • Highest education level: None   Occupational History   • None   Tobacco Use   • Smoking status: Never     Passive exposure: Never   • Smokeless tobacco: Never   Vaping Use   • Vaping Use: Never used   Substance and Sexual Activity   • Alcohol use: Yes     Comment: rare   • Drug use: No   • Sexual activity: Yes     Partners: Male     Birth control/protection: Post-menopausal, None   Other Topics Concern   • None   Social History Narrative   • None     Social Determinants of Health     Financial Resource Strain: Low Risk  (8/17/2023)    Overall Financial Resource Strain (CARDIA)    • Difficulty of Paying Living Expenses: Not hard at all   Food Insecurity: Not on file   Transportation Needs: No Transportation Needs (8/17/2023)    PRAPARE - Transportation    • Lack of Transportation (Medical): No    • Lack of Transportation (Non-Medical):  No   Physical Activity: Not on file   Stress: Not on file   Social Connections: Not on file   Intimate Partner Violence: Not on file   Housing Stability: Not on file      Medications and Allergies:     Current Outpatient Medications   Medication Sig Dispense Refill   • amLODIPine (NORVASC) 5 mg tablet Take 1 tablet (5 mg total) by mouth 2 (two) times a day 180 tablet 3   • Ascorbic Acid (VITAMIN C) 500 MG CAPS Take 1 capsule by mouth daily     • Calcium Carbonate-Vitamin D3 600-400 MG-UNIT TABS Take by mouth in the morning     • Cholecalciferol (VITAMIN D) 2000 units tablet Take by mouth daily     • famotidine (PEPCID) 20 mg tablet Take 1 tablet (20 mg total) by mouth every morning 90 tablet 3   • folic acid (FOLVITE) 898 MCG tablet Take 1 tablet by mouth daily     • hydrochlorothiazide (HYDRODIURIL) 25 mg tablet Take 1 tablet (25 mg total) by mouth daily 90 tablet 3   • omeprazole (PriLOSEC) 20 mg delayed release capsule Take 1 capsule (20 mg total) by mouth daily 90 capsule 3   • potassium chloride (K-DUR,KLOR-CON) 10 mEq tablet Take 1 tablet (10 mEq total) by mouth daily 90 tablet 3   • prednisoLONE acetate (PRED FORTE) 1 % ophthalmic suspension      • simvastatin (ZOCOR) 20 mg tablet Take 1 tablet (20 mg total) by mouth daily 90 tablet 3   • valsartan (DIOVAN) 160 mg tablet Take 1 tablet (160 mg total) by mouth daily 90 tablet 1     No current facility-administered medications for this visit. Allergies   Allergen Reactions   • Sulfa Antibiotics Other (See Comments)     Unknown reaction    • Motrin [Ibuprofen] GI Intolerance     If on prescription strength, over the counter she does fine with      Immunizations:     Immunization History   Administered Date(s) Administered   • COVID-19 PFIZER VACCINE 0.3 ML IM 02/28/2021, 03/21/2021   • Hep A, adult 10/05/2019, 10/21/2019   • INFLUENZA 09/26/2011, 09/17/2012, 10/21/2013, 10/03/2014, 10/08/2015, 10/18/2016, 09/19/2017, 10/09/2018, 09/26/2019   • Influenza, high dose seasonal 0.7 mL 10/28/2020, 11/03/2021   • Pneumococcal Conjugate 13-Valent 12/16/2015, 09/19/2017   • Pneumococcal Polysaccharide PPV23 10/17/2007   • Tdap 09/19/2019   • Zoster 03/18/2009   • Zoster Vaccine Recombinant 09/05/2019, 01/20/2020, 03/12/2020      Health Maintenance:         Topic Date Due   • Colorectal Cancer Screening  04/27/2026         Topic Date Due   • Influenza Vaccine (1) 09/01/2023      Medicare Screening Tests and Risk Assessments:     Zara Martínez is here for her Subsequent Wellness visit. Last Medicare Wellness visit information reviewed, patient interviewed and updates made to the record today. Health Risk Assessment:   Patient rates overall health as very good. Patient feels that their physical health rating is same.  Patient is very satisfied with their life. Iva Zuluaga was rated as same. Hearing was rated as same. Patient feels that their emotional and mental health rating is same. Patients states they are never, rarely angry. Patient states they are never, rarely unusually tired/fatigued. Pain experienced in the last 7 days has been some. Patient's pain rating has been 3/10. Patient states that she has experienced no weight loss or gain in last 6 months. Just recently I started having pain in my left shoulder bursa, a condition I experienced in July, 2019. Have appt. scheduled with my orthopedic doctor. Fall Risk Screening: In the past year, patient has experienced: no history of falling in past year      Urinary Incontinence Screening:   Patient has not leaked urine accidently in the last six months. Except one time when I didn't feel like getting out of bed, and when I did and bent over to get out of bed, a few drops seeped out! Home Safety:  Patient does not have trouble with stairs inside or outside of their home. Patient has working smoke alarms and has working carbon monoxide detector. Home safety hazards include: none. Nutrition:   Current diet is Regular, No Added Salt and Limited junk food. Medications:   Patient is currently taking over-the-counter supplements. OTC medications include: Vitamins C, D3, Calcium, Folic Acid. Patient is able to manage medications. Activities of Daily Living (ADLs)/Instrumental Activities of Daily Living (IADLs):   Walk and transfer into and out of bed and chair?: Yes  Dress and groom yourself?: Yes    Bathe or shower yourself?: Yes    Feed yourself?  Yes  Do your laundry/housekeeping?: Yes  Manage your money, pay your bills and track your expenses?: Yes  Make your own meals?: Yes    Do your own shopping?: Yes    Durable Medical Equipment Suppliers  [employer]    Previous Hospitalizations:   Any hospitalizations or ED visits within the last 12 months?: Yes    How many hospitalizations have you had in the last year?: 1-2    Hospitalization Comments: I had DPPV    Advance Care Planning:   Living will: Yes    Durable POA for healthcare: Yes    Advanced directive: Yes      Cognitive Screening:   Provider or family/friend/caregiver concerned regarding cognition?: No    PREVENTIVE SCREENINGS      Cardiovascular Screening:    General: History Lipid Disorder and Screening Current      Diabetes Screening:     General: Screening Current      Colorectal Cancer Screening:     General: Screening Current      Breast Cancer Screening:     General: Screening Current      Cervical Cancer Screening:    General: Screening Not Indicated      Osteoporosis Screening:    General: Screening Not Indicated      Abdominal Aortic Aneurysm (AAA) Screening:        General: Screening Not Indicated      Lung Cancer Screening:     General: Screening Not Indicated      Hepatitis C Screening:    General: Screening Not Indicated    Screening, Brief Intervention, and Referral to Treatment (SBIRT)    Screening  Typical number of drinks in a day: 0  Typical number of drinks in a week: 0  Interpretation: Low risk drinking behavior. AUDIT-C Screenin) How often did you have a drink containing alcohol in the past year? monthly or less  2) How many drinks did you have on a typical day when you were drinking in the past year? 1 to 2  3) How often did you have 6 or more drinks on one occasion in the past year? never    AUDIT-C Score: 1  Interpretation: Score 0-2 (female): Negative screen for alcohol misuse    Single Item Drug Screening:  How often have you used an illegal drug (including marijuana) or a prescription medication for non-medical reasons in the past year? never    Single Item Drug Screen Score: 0  Interpretation: Negative screen for possible drug use disorder    Brief Intervention  Alcohol & drug use screenings were reviewed. No concerns regarding substance use disorder identified.      Other Counseling Topics:   Regular weightbearing exercise and calcium and vitamin D intake. Physical Exam:     /74 (BP Location: Left arm, Patient Position: Sitting, Cuff Size: Large)   Pulse (!) 54   Temp 97.8 °F (36.6 °C)   Ht 5' 4" (1.626 m)   Wt 90 kg (198 lb 8 oz)   SpO2 96%   BMI 34.07 kg/m²     BP Readings from Last 3 Encounters:   08/24/23 124/74   06/30/23 146/82   06/01/23 128/64     Wt Readings from Last 3 Encounters:   08/24/23 90 kg (198 lb 8 oz)   06/01/23 90.4 kg (199 lb 6.4 oz)   05/31/23 90.4 kg (199 lb 4.8 oz)           Physical Exam  Constitutional:       General: She is not in acute distress. HENT:      Right Ear: External ear normal.      Left Ear: External ear normal.   Eyes:      General: No scleral icterus. Conjunctiva/sclera: Conjunctivae normal.   Neck:      Vascular: No carotid bruit. Cardiovascular:      Rate and Rhythm: Normal rate and regular rhythm. Heart sounds: No murmur heard. No gallop. Pulmonary:      Effort: Pulmonary effort is normal.      Breath sounds: Normal breath sounds. No wheezing or rales. Abdominal:      General: Bowel sounds are normal. There is no distension. Palpations: Abdomen is soft. There is no hepatomegaly, splenomegaly or mass. Tenderness: There is no abdominal tenderness. There is no guarding or rebound. Musculoskeletal:      Right lower leg: No edema. Left lower leg: No edema. Comments: Inspection L shoulder normal. No effusion. No warmth or redness. + L subacromial  tenderness. Full ROM with abduction, internal and external rotation. Negative cross arm test. Negative Speed test. Negative Yergason sign. +  impingement sign. + empty can sign. Negative sulcus sign. Negative [place]   Lymphadenopathy:      Cervical: No cervical adenopathy. Skin:     Findings: No rash. Neurological:      General: No focal deficit present. Mental Status: She is alert and oriented to person, place, and time.    Psychiatric:         Mood and Affect: Mood normal. Behavior: Behavior normal.         Cognition and Memory: Cognition normal.        Large joint arthrocentesis: L subacromial bursa  Universal Protocol:  Consent: Verbal consent obtained.   Risks and benefits: risks, benefits and alternatives were discussed  Consent given by: patient  Site marked: the operative site was marked  Supporting Documentation  Indications: pain (Impingement syndrome L shoulder )   Procedure Details  Location: shoulder - L subacromial bursa  Preparation: Betadine   Needle size: 22 G  Ultrasound guidance: no  Approach: posterior    Patient tolerance: patient tolerated the procedure well with no immediate complications  Dressing:  Sterile dressing applied    DepoMedrol 80 mg/ML and Lidocaine 1% 4 ML           Burton Farnsworth MD

## 2023-08-24 ENCOUNTER — OFFICE VISIT (OUTPATIENT)
Dept: FAMILY MEDICINE CLINIC | Facility: CLINIC | Age: 83
End: 2023-08-24
Payer: MEDICARE

## 2023-08-24 VITALS
TEMPERATURE: 97.8 F | HEART RATE: 54 BPM | OXYGEN SATURATION: 96 % | HEIGHT: 64 IN | BODY MASS INDEX: 33.89 KG/M2 | WEIGHT: 198.5 LBS | DIASTOLIC BLOOD PRESSURE: 74 MMHG | SYSTOLIC BLOOD PRESSURE: 124 MMHG

## 2023-08-24 DIAGNOSIS — R73.03 PREDIABETES: ICD-10-CM

## 2023-08-24 DIAGNOSIS — I10 ESSENTIAL HYPERTENSION: Primary | ICD-10-CM

## 2023-08-24 DIAGNOSIS — M19.012 OSTEOARTHRITIS OF GLENOHUMERAL JOINT, LEFT: ICD-10-CM

## 2023-08-24 DIAGNOSIS — M75.42 IMPINGEMENT SYNDROME OF LEFT SHOULDER: ICD-10-CM

## 2023-08-24 DIAGNOSIS — K44.9 HIATAL HERNIA: ICD-10-CM

## 2023-08-24 DIAGNOSIS — E78.00 PURE HYPERCHOLESTEROLEMIA: ICD-10-CM

## 2023-08-24 DIAGNOSIS — Z00.00 MEDICARE ANNUAL WELLNESS VISIT, SUBSEQUENT: ICD-10-CM

## 2023-08-24 DIAGNOSIS — M19.012 OSTEOARTHRITIS OF LEFT ACROMIOCLAVICULAR JOINT: ICD-10-CM

## 2023-08-24 DIAGNOSIS — K21.9 GASTROESOPHAGEAL REFLUX DISEASE WITHOUT ESOPHAGITIS: ICD-10-CM

## 2023-08-24 PROBLEM — E66.01 OBESITY, MORBID (HCC): Status: RESOLVED | Noted: 2023-05-17 | Resolved: 2023-08-24

## 2023-08-24 PROCEDURE — G0439 PPPS, SUBSEQ VISIT: HCPCS | Performed by: FAMILY MEDICINE

## 2023-08-24 PROCEDURE — 99214 OFFICE O/P EST MOD 30 MIN: CPT | Performed by: FAMILY MEDICINE

## 2023-08-24 PROCEDURE — 20610 DRAIN/INJ JOINT/BURSA W/O US: CPT | Performed by: FAMILY MEDICINE

## 2023-08-24 RX ORDER — METHYLPREDNISOLONE ACETATE 80 MG/ML
80 INJECTION, SUSPENSION INTRA-ARTICULAR; INTRALESIONAL; INTRAMUSCULAR; SOFT TISSUE ONCE
Status: COMPLETED | OUTPATIENT
Start: 2023-08-24 | End: 2023-08-24

## 2023-08-24 RX ADMIN — METHYLPREDNISOLONE ACETATE 80 MG: 80 INJECTION, SUSPENSION INTRA-ARTICULAR; INTRALESIONAL; INTRAMUSCULAR; SOFT TISSUE at 11:57

## 2023-08-24 NOTE — PROGRESS NOTES
Assessment and Plan:     Problem List Items Addressed This Visit    None       Preventive health issues were discussed with patient, and age appropriate screening tests were ordered as noted in patient's After Visit Summary. Personalized health advice and appropriate referrals for health education or preventive services given if needed, as noted in patient's After Visit Summary.      History of Present Illness:     Patient presents for a Medicare Wellness Visit    HPI   Patient Care Team:  Joan Meehan MD as PCP - General (Family Medicine)  MARIELOS Messina,      Review of Systems:     Review of Systems     Problem List:     Patient Active Problem List   Diagnosis   • History of DVT of lower extremity   • Essential hypertension   • Fibrocystic breast changes   • Gastroesophageal reflux disease   • Hearing loss   • Hiatal hernia   • Hyperlipidemia   • Varicose veins of left lower extremity   • Bradycardia   • Obesity   • Vertigo   • Obesity, morbid ([address])      Past Medical and Surgical History:     Past Medical History:   Diagnosis Date   • Acid reflux    • BPPV (benign paroxysmal positional vertigo)    • DVT (deep venous thrombosis) ([address]) 01/2018   • Hearing loss    • Hypercholesterolemia    • Hypertension    • Impingement syndrome of left shoulder 07/12/2019   • Wears glasses      Past Surgical History:   Procedure Laterality Date   • BREAST BIOPSY Left     1995 left, 1997 unknown side   • COLONOSCOPY     • TONSILLECTOMY     • TUBAL LIGATION     • UPPER GASTROINTESTINAL ENDOSCOPY     • WISDOM TOOTH EXTRACTION  1962      Family History:     Family History   Problem Relation Age of Onset   • Diabetes Mother    • Hypertension Mother    • Cancer Father    • Diabetes Father    • Hypertension Father    • Colon cancer Father 72   • Congenital heart disease Daughter    • Hyperlipidemia Other         pure   • Lymphoma Sister 72   • Lung cancer Sister 62   • Skin cancer Brother 72        Melanoma   • No Known Problems Maternal Grandmother    • No Known Problems Maternal Grandfather    • No Known Problems Paternal Grandmother    • No Known Problems Paternal Grandfather    • Prostate cancer Brother 72   • No Known Problems Daughter    • Breast cancer Other 54      Social History:     Social History     Socioeconomic History   • Marital status: /Civil Union     Spouse name: None   • Number of children: None   • Years of education: None   • Highest education level: None   Occupational History   • None   Tobacco Use   • Smoking status: Never     Passive exposure: Never   • Smokeless tobacco: Never   Vaping Use   • Vaping Use: Never used   Substance and Sexual Activity   • Alcohol use: Yes     Comment: rare   • Drug use: No   • Sexual activity: Yes     Partners: Male     Birth control/protection: Post-menopausal, None   Other Topics Concern   • None   Social History Narrative   • None     Social Determinants of Health     Financial Resource Strain: Low Risk  (8/17/2023)    Overall Financial Resource Strain (CARDIA)    • Difficulty of Paying Living Expenses: Not hard at all   Food Insecurity: Not on file   Transportation Needs: No Transportation Needs (8/17/2023)    PRAPARE - Transportation    • Lack of Transportation (Medical): No    • Lack of Transportation (Non-Medical):  No   Physical Activity: Not on file   Stress: Not on file   Social Connections: Not on file   Intimate Partner Violence: Not on file   Housing Stability: Not on file      Medications and Allergies:     Current Outpatient Medications   Medication Sig Dispense Refill   • amLODIPine (NORVASC) 5 mg tablet Take 1 tablet (5 mg total) by mouth 2 (two) times a day 180 tablet 3   • Ascorbic Acid (VITAMIN C) 500 MG CAPS Take 1 capsule by mouth daily     • Calcium Carbonate-Vitamin D3 600-400 MG-UNIT TABS Take by mouth in the morning     • Cholecalciferol (VITAMIN D) 2000 units tablet Take by mouth daily     • famotidine (PEPCID) 20 mg tablet Take 1 tablet (20 mg total) by mouth every morning 90 tablet 3   • folic acid (FOLVITE) 414 MCG tablet Take 1 tablet by mouth daily     • hydrochlorothiazide (HYDRODIURIL) 25 mg tablet Take 1 tablet (25 mg total) by mouth daily 90 tablet 3   • omeprazole (PriLOSEC) 20 mg delayed release capsule Take 1 capsule (20 mg total) by mouth daily 90 capsule 3   • potassium chloride (K-DUR,KLOR-CON) 10 mEq tablet Take 1 tablet (10 mEq total) by mouth daily 90 tablet 3   • prednisoLONE acetate (PRED FORTE) 1 % ophthalmic suspension      • simvastatin (ZOCOR) 20 mg tablet Take 1 tablet (20 mg total) by mouth daily 90 tablet 3   • valsartan (DIOVAN) 160 mg tablet Take 1 tablet (160 mg total) by mouth daily 90 tablet 1     No current facility-administered medications for this visit. Allergies   Allergen Reactions   • Sulfa Antibiotics Other (See Comments)     Unknown reaction    • Motrin [Ibuprofen] GI Intolerance     If on prescription strength, over the counter she does fine with      Immunizations:     Immunization History   Administered Date(s) Administered   • COVID-19 PFIZER VACCINE 0.3 ML IM 02/28/2021, 03/21/2021   • Hep A, adult 10/05/2019, 10/21/2019   • INFLUENZA 09/26/2011, 09/17/2012, 10/21/2013, 10/03/2014, 10/08/2015, 10/18/2016, 09/19/2017, 10/09/2018, 09/26/2019   • Influenza, high dose seasonal 0.7 mL 10/28/2020, 11/03/2021   • Pneumococcal Conjugate 13-Valent 12/16/2015, 09/19/2017   • Pneumococcal Polysaccharide PPV23 10/17/2007   • Tdap 09/19/2019   • Zoster 03/18/2009   • Zoster Vaccine Recombinant 09/05/2019, 01/20/2020, 03/12/2020      Health Maintenance:         Topic Date Due   • Colorectal Cancer Screening  04/27/2026         Topic Date Due   • Influenza Vaccine (1) 09/01/2023      Medicare Screening Tests and Risk Assessments:     Chen Vang is here for her Subsequent Wellness visit. Health Risk Assessment:   Patient rates overall health as very good. Patient feels that their physical health rating is same. Patient is very satisfied with their life. Eyesight was rated as same. Hearing was rated as same. Patient feels that their emotional and mental health rating is same. Patients states they are never, rarely angry. Patient states they are never, rarely unusually tired/fatigued. Pain experienced in the last 7 days has been some. Patient's pain rating has been 3/10. Patient states that she has experienced no weight loss or gain in last 6 months. Just recently I started having pain in my left shoulder bursa, a condition I experienced in July, 2019. Have appt. scheduled with my orthopedic doctor. Fall Risk Screening: In the past year, patient has experienced: no history of falling in past year      Urinary Incontinence Screening:   Patient has not leaked urine accidently in the last six months. Except one time when I didn't feel like getting out of bed, and when I did and bent over to get out of bed, a few drops seeped out! Home Safety:  Patient does not have trouble with stairs inside or outside of their home. Patient has working smoke alarms and has working carbon monoxide detector. Home safety hazards include: none. Nutrition:   Current diet is Regular, No Added Salt and Limited junk food. Medications:   Patient is currently taking over-the-counter supplements. OTC medications include: Vitamins C, D3, Calcium, Folic Acid. Patient is able to manage medications. Activities of Daily Living (ADLs)/Instrumental Activities of Daily Living (IADLs):   Walk and transfer into and out of bed and chair?: Yes  Dress and groom yourself?: Yes    Bathe or shower yourself?: Yes    Feed yourself?  Yes  Do your laundry/housekeeping?: Yes  Manage your money, pay your bills and track your expenses?: Yes  Make your own meals?: Yes    Do your own shopping?: Yes    Durable Medical Equipment Suppliers  [employer]    Previous Hospitalizations:   Any hospitalizations or ED visits within the last 12 months?: Yes    How many hospitalizations have you had in the last year?: 1-2    Hospitalization Comments: I had DPPV    Advance Care Planning:   Living will: Yes    Durable POA for healthcare: Yes    Advanced directive: Yes      PREVENTIVE SCREENINGS      Cardiovascular Screening:    General: Screening Not Indicated and History Lipid Disorder      Diabetes Screening:     General: Screening Current      Colorectal Cancer Screening:     General: Screening Current      Breast Cancer Screening:     General: Screening Current      Cervical Cancer Screening:    General: Screening Not Indicated      Lung Cancer Screening:     General: Screening Not Indicated    Screening, Brief Intervention, and Referral to Treatment (SBIRT)    Screening  Typical number of drinks in a day: 0  Typical number of drinks in a week: 0  Interpretation: Low risk drinking behavior. AUDIT-C Screenin) How often did you have a drink containing alcohol in the past year? monthly or less  2) How many drinks did you have on a typical day when you were drinking in the past year? 1 to 2  3) How often did you have 6 or more drinks on one occasion in the past year? never    AUDIT-C Score: 1  Interpretation: Score 0-2 (female): Negative screen for alcohol misuse    Single Item Drug Screening:  How often have you used an illegal drug (including marijuana) or a prescription medication for non-medical reasons in the past year? never    Single Item Drug Screen Score: 0  Interpretation: Negative screen for possible drug use disorder    No results found.      Physical Exam:     /74 (BP Location: Left arm, Patient Position: Sitting, Cuff Size: Large)   Pulse (!) 54   Temp 97.8 °F (36.6 °C)   Ht 5' 4" (1.626 m)   Wt 90 kg (198 lb 8 oz)   SpO2 96%   BMI 34.07 kg/m²     Physical Exam     rKystle Gross MD

## 2023-08-24 NOTE — PATIENT INSTRUCTIONS
Medicare Preventive Visit Patient Instructions  Thank you for completing your Welcome to Medicare Visit or Medicare Annual Wellness Visit today. Your next wellness visit will be due in one year (8/24/2024). The screening/preventive services that you may require over the next 5-10 years are detailed below. Some tests may not apply to you based off risk factors and/or age. Screening tests ordered at today's visit but not completed yet may show as past due. Also, please note that scanned in results may not display below. Preventive Screenings:  Service Recommendations Previous Testing/Comments   Colorectal Cancer Screening  * Colonoscopy    * Fecal Occult Blood Test (FOBT)/Fecal Immunochemical Test (FIT)  * Fecal DNA/Cologuard Test  * Flexible Sigmoidoscopy Age: 43-73 years old   Colonoscopy: every 10 years (may be performed more frequently if at higher risk)  OR  FOBT/FIT: every 1 year  OR  Cologuard: every 3 years  OR  Sigmoidoscopy: every 5 years  Screening may be recommended earlier than age 39 if at higher risk for colorectal cancer. Also, an individualized decision between you and your healthcare provider will decide whether screening between the ages of 77-80 would be appropriate. Colonoscopy: 04/27/2021  FOBT/FIT: Not on file  Cologuard: Not on file  Sigmoidoscopy: Not on file    Screening Current  Screening Current     Breast Cancer Screening Age: 36 years old  Frequency: every 1-2 years  Not required if history of left and right mastectomy Mammogram: 10/17/2022    Screening Current  Screening Current   Cervical Cancer Screening Between the ages of 21-29, pap smear recommended once every 3 years. Between the ages of 32-69, can perform pap smear with HPV co-testing every 5 years.    Recommendations may differ for women with a history of total hysterectomy, cervical cancer, or abnormal pap smears in past. Pap Smear: 06/01/2023    Screening Not Indicated  Screening Not Indicated   Hepatitis C Screening Once for adults born between 1945 and 1965  More frequently in patients at high risk for Hepatitis C Hep C Antibody: Not on file        Diabetes Screening 1-2 times per year if you're at risk for diabetes or have pre-diabetes Fasting glucose: 103 mg/dL (8/10/2023)  A1C: 5.7 % (5/10/2023)  Screening Current  Screening Current   Cholesterol Screening Once every 5 years if you don't have a lipid disorder. May order more often based on risk factors. Lipid panel: 08/10/2023    Screening Not Indicated  History Lipid Disorder  Screening Not Indicated  History Lipid Disorder     Other Preventive Screenings Covered by Medicare:  1. Abdominal Aortic Aneurysm (AAA) Screening: covered once if your at risk. You're considered to be at risk if you have a family history of AAA. 2. Lung Cancer Screening: covers low dose CT scan once per year if you meet all of the following conditions: (1) Age 48-67; (2) No signs or symptoms of lung cancer; (3) Current smoker or have quit smoking within the last 15 years; (4) You have a tobacco smoking history of at least 20 pack years (packs per day multiplied by number of years you smoked); (5) You get a written order from a healthcare provider. 3. Glaucoma Screening: covered annually if you're considered high risk: (1) You have diabetes OR (2) Family history of glaucoma OR (3)  aged 48 and older OR (3)  American aged 72 and older  3. Osteoporosis Screening: covered every 2 years if you meet one of the following conditions: (1) You're estrogen deficient and at risk for osteoporosis based off medical history and other findings; (2) Have a vertebral abnormality; (3) On glucocorticoid therapy for more than 3 months; (4) Have primary hyperparathyroidism; (5) On osteoporosis medications and need to assess response to drug therapy. · Last bone density test (DXA Scan): Not on file. 5. HIV Screening: covered annually if you're between the age of 14-79.  Also covered annually if you are younger than 13 and older than 72 with risk factors for HIV infection. For pregnant patients, it is covered up to 3 times per pregnancy. Immunizations:  Immunization Recommendations   Influenza Vaccine Annual influenza vaccination during flu season is recommended for all persons aged >= 6 months who do not have contraindications   Pneumococcal Vaccine   * Pneumococcal conjugate vaccine = PCV13 (Prevnar 13), PCV15 (Vaxneuvance), PCV20 (Prevnar 20)  * Pneumococcal polysaccharide vaccine = PPSV23 (Pneumovax) Adults 20-63 years old: 1-3 doses may be recommended based on certain risk factors  Adults 72 years old: 1-2 doses may be recommended based off what pneumonia vaccine you previously received   Hepatitis B Vaccine 3 dose series if at intermediate or high risk (ex: diabetes, end stage renal disease, liver disease)   Tetanus (Td) Vaccine - COST NOT COVERED BY MEDICARE PART B Following completion of primary series, a booster dose should be given every 10 years to maintain immunity against tetanus. Td may also be given as tetanus wound prophylaxis. Tdap Vaccine - COST NOT COVERED BY MEDICARE PART B Recommended at least once for all adults. For pregnant patients, recommended with each pregnancy. Shingles Vaccine (Shingrix) - COST NOT COVERED BY MEDICARE PART B  2 shot series recommended in those aged 48 and above     Health Maintenance Due:      Topic Date Due   • Colorectal Cancer Screening  04/27/2026     Immunizations Due:      Topic Date Due   • Influenza Vaccine (1) 09/01/2023     Advance Directives   What are advance directives? Advance directives are legal documents that state your wishes and plans for medical care. These plans are made ahead of time in case you lose your ability to make decisions for yourself. Advance directives can apply to any medical decision, such as the treatments you want, and if you want to donate organs. What are the types of advance directives?   There are many types of advance directives, and each state has rules about how to use them. You may choose a combination of any of the following:  · Living will: This is a written record of the treatment you want. You can also choose which treatments you do not want, which to limit, and which to stop at a certain time. This includes surgery, medicine, IV fluid, and tube feedings. · Durable power of  for healthcare [facility]): This is a written record that states who you want to make healthcare choices for you when you are unable to make them for yourself. This person, called a proxy, is usually a family member or a friend. You may choose more than 1 proxy. · Do not resuscitate (DNR) order:  A DNR order is used in case your heart stops beating or you stop breathing. It is a request not to have certain forms of treatment, such as CPR. A DNR order may be included in other types of advance directives. · Medical directive: This covers the care that you want if you are in a coma, near death, or unable to make decisions for yourself. You can list the treatments you want for each condition. Treatment may include pain medicine, surgery, blood transfusions, dialysis, IV or tube feedings, and a ventilator (breathing machine). · Values history: This document has questions about your views, beliefs, and how you feel and think about life. This information can help others choose the care that you would choose. Why are advance directives important? An advance directive helps you control your care. Although spoken wishes may be used, it is better to have your wishes written down. Spoken wishes can be misunderstood, or not followed. Treatments may be given even if you do not want them. An advance directive may make it easier for your family to make difficult choices about your care.    Weight Management   Why it is important to manage your weight:  Being overweight increases your risk of health conditions such as heart disease, high blood pressure, type 2 diabetes, and certain types of cancer. It can also increase your risk for osteoarthritis, sleep apnea, and other respiratory problems. Aim for a slow, steady weight loss. Even a small amount of weight loss can lower your risk of health problems. How to lose weight safely:  A safe and healthy way to lose weight is to eat fewer calories and get regular exercise. You can lose up about 1 pound a week by decreasing the number of calories you eat by 500 calories each day. Healthy meal plan for weight management:  A healthy meal plan includes a variety of foods, contains fewer calories, and helps you stay healthy. A healthy meal plan includes the following:  · Eat whole-grain foods more often. A healthy meal plan should contain fiber. Fiber is the part of grains, fruits, and vegetables that is not broken down by your body. Whole-grain foods are healthy and provide extra fiber in your diet. Some examples of whole-grain foods are whole-wheat breads and pastas, oatmeal, brown rice, and bulgur. · Eat a variety of vegetables every day. Include dark, leafy greens such as spinach, kale, logan greens, and mustard greens. Eat yellow and orange vegetables such as carrots, sweet potatoes, and winter squash. · Eat a variety of fruits every day. Choose fresh or canned fruit (canned in its own juice or light syrup) instead of juice. Fruit juice has very little or no fiber. · Eat low-fat dairy foods. Drink fat-free (skim) milk or 1% milk. Eat fat-free yogurt and low-fat cottage cheese. Try low-fat cheeses such as mozzarella and other reduced-fat cheeses. · Choose meat and other protein foods that are low in fat. Choose beans or other legumes such as split peas or lentils. Choose fish, skinless poultry (chicken or turkey), or lean cuts of red meat (beef or pork). Before you cook meat or poultry, cut off any visible fat. · Use less fat and oil. Try baking foods instead of frying them.  Add less fat, such as margarine, sour cream, regular salad dressing and mayonnaise to foods. Eat fewer high-fat foods. Some examples of high-fat foods include french fries, doughnuts, ice cream, and cakes. · Eat fewer sweets. Limit foods and drinks that are high in sugar. This includes candy, cookies, regular soda, and sweetened drinks. Exercise:  Exercise at least 30 minutes per day on most days of the week. Some examples of exercise include walking, biking, dancing, and swimming. You can also fit in more physical activity by taking the stairs instead of the elevator or parking farther away from stores. Ask your healthcare provider about the best exercise plan for you. © Copyright [employer] 2018 Information is for End User's use only and may not be sold, redistributed or otherwise used for commercial purposes.  All illustrations and images included in CareNotes® are the copyrighted property of [employer] or [address] [place] [address]

## 2023-09-27 ENCOUNTER — EVALUATION (OUTPATIENT)
Dept: PHYSICAL THERAPY | Age: 83
End: 2023-09-27
Payer: MEDICARE

## 2023-09-27 VITALS — DIASTOLIC BLOOD PRESSURE: 68 MMHG | HEART RATE: 50 BPM | SYSTOLIC BLOOD PRESSURE: 150 MMHG

## 2023-09-27 DIAGNOSIS — R26.9 ABNORMALITY OF GAIT: Primary | ICD-10-CM

## 2023-09-27 DIAGNOSIS — R42 VERTIGO: ICD-10-CM

## 2023-09-27 DIAGNOSIS — R42 DIZZINESS: ICD-10-CM

## 2023-09-27 PROCEDURE — 97140 MANUAL THERAPY 1/> REGIONS: CPT

## 2023-09-27 PROCEDURE — 97162 PT EVAL MOD COMPLEX 30 MIN: CPT

## 2023-09-28 DIAGNOSIS — M25.512 LEFT SHOULDER PAIN, UNSPECIFIED CHRONICITY: Primary | ICD-10-CM

## 2023-09-28 NOTE — PROGRESS NOTES
PT Evaluation     Today's date: 2023  Patient name: Roger Rosas  : 1940  MRN: [id number]  Referring provider: Grupo Rey  Dx:   Encounter Diagnosis     ICD-10-CM    1. Abnormality of gait  R26.9       2. Dizziness  R42 Ambulatory referral to Physical Therapy      3. Vertigo  R42                      Assessment  Assessment details: Rogre Rosas is an 82year old female with a hx of dizziness/vertigo episode 4 years ago now presenting with a significant exacerbation of symptoms noted on 23  Requiring hospitalization and second significant episode noted 3 weeks ago. She presents with decreased vestibular function,  BPPV symptoms right post and horizontal canal involvement, decreased balance, unstable gait especially noted with narrow base of support , and decreased cervical stabilization. PT is warranted to address the above stated deficits in efforts to reduction c/o vertigo/dizziness, improve balance and unstable gait. A right epley was performed today with some reduction in symptoms however the pt may require a right horizontal canal repositioning maneuver f/b right epley pending continued vertigo in the future. PT also to perform [employer] balance testing in the future  (mod CT SIB and limits of stability testing )Training today with rolling use of bent knee to roll using gluteal and hamstring muscles rather than pt "scooting" and hyperextending her head in the pillow to assist scoot and roll. Pt also with decreased convergence noted however pt being monitored for small cataract and freckle in left eye by her eye doctor. Pt to avoid bending forward below hips for 2 days after today post maneuver and keep head up thereafter for the weekend with yardwork repetitive bending activities.    Impairments: abnormal or restricted ROM, activity intolerance, impaired balance, lacks appropriate home exercise program, poor posture  and poor body mechanics  Other impairment: BPPV symptoms right post canal , right horizontal canal, decreased vestibular functioning,  decreased convergence ability, bradycardia  Functional limitations: decreased balance, unstable gait especially with narrow base , decreased ability to perform vor 2 exer, c/o vertigo, decreased bed mobility   Symptom irritability: moderateUnderstanding of Dx/Px/POC: good   Prognosis: good    Goals  Short Term  1. The pt shall achieve vertigo reduction by 50 percent in two weeks. 2. The pt shall achieve independent bed mobility in two weeks. 3. The pt shall prove independent in a home exer program in two weeks. 4 avoid neck hyperextension with rolling/( pt scooting in bed less than ideal rolling technique ),  Sleeping in chair with head in 45 degree tilted angle flexed forward or hyperextended ( use airplane pillow or cervical towel roll to support neck in chair in 2 weeks )    Long Term Goals Vestibular  1. The pt shall achieve 80 percent reduction in vertigo in 4 weeks   2.   The pt shall achieve quick turns with gait with not loss of balance in 4 weeks    Plan  Patient would benefit from: PT eval and skilled physical therapy  Referral necessary: Yes  Other planned modality interventions: prn  Planned therapy interventions: manual therapy, canalith repositioning, neuromuscular re-education, patient education, postural training, balance, strengthening, stretching, therapeutic activities and therapeutic exercise (cervical stabilization exer)  Other planned therapy interventions: vestibular rehabilitation, habituation, canolith repositioning post and horizont canals as needed, cervical stabilization exer, rolling technique correction  avoid hyperextension and head lagging behind  Frequency: 2x week  Duration in weeks: 8  Plan of Care beginning date: 9/27/2023  Plan of Care expiration date: 11/27/2023  Treatment plan discussed with: patient        Subjective Evaluation    History of Present Illness  Onset date: 5/9/23 c/o onset of vertigo unable to get out of bed , severe dizziness , imbalance unstable gait. Mechanism of injury: Altaf Fox is an 82year old female referred to outpt PT with a diagnosis of gait abnormality, imbalance and c/o dizziness with report of significant episode noted on 23 when she reported being unable to get out of bed due to severe dizziness/vertigo , imbalance and unstable gait. She was taken to [facility] and kept overnight and given meclazine which did not help. She then saw Brad Yanes at [facility] who did perform an epley maneuver with symptoms then improved. Three weeks ago she report another onset of vertigo with getting in to bed at night with rolling from her left side to her right side significant onset of symptoms reported. Symptoms are worse with position changes. She does report being hard of hearing since age 23 and wears bilateral hearing aides. ( see also PMH) . She does report recently performing much yardwork with frequent bending activities. Recurrent probem    Quality of life: good    Patient Goals  Patient goals for therapy: improved balance, increased motion, increased strength and return to sport/leisure activities  Patient goal: no loss of balance with quick turns with gait, reduction of vertigo. and improve balance  Pain  Current pain ratin  At best pain ratin  At worst pain ratin  Location: denies any pain     Social Support    Employment status: not working  Exercise history: likes to walk, does cooking , cleaning , yardwork, avid exerciser 3-4 times a week does eliptical 20 min treadmill 10 min bike 10 min at [employer]     Treatments  Current treatment: physical therapy        Objective     Active Range of Motion   Cervical/Thoracic Spine       Cervical  Subcranial protraction:  WFL   Subcranial retraction: Active cervical subcranial retraction: 3/4 range.      Flexion:  WFL  Extension: Neck active extension: light headed slight at excessive end range.     WFL  Left lateral flexion:  WFL  Right lateral flexion:  WFL  Left rotation:  WFL  Right rotation:  [facility]    Additional Active Range of Motion Details  + forward head , ct junction rigidity    Strength/Myotome Testing     Additional Strength Details  Cervical mmt 5/5 including axial extension , suboccipital myofascial restriction noted,     Ambulation     Ambulation: Level Surfaces   Ambulation without assistive device: independent    Additional Level Surfaces Ambulation Details  150 ft ,  Supine lying neg, rolling right neg, left neg ( pt reports at times rolling from left to right c/o vertigo not today), right sidelying to sit c/o slight vertigo, sit to side neg, left sidelying to sit and sit to sidelying negative,     [employer] step testing eyes open 2 inches forward deviation wfl's,  Eyes closed 1 and 1/2 feet forward 30 degree turn to left indicating vestibular dysfunction  Functional gait assessment 26/30  [place] hallpike testing left neg, right 15 second nystagmus post canal     Static standing flat surface eyes open 30 sec,  Eyes closed 30 sec sl ant post sway wfl's,  Foam surface eyes open slight ant post sway noted,  Eyes closed excessive ant post sway noted. Standing foam eyes closed vertical head turns excessive ant post sway noted greater than horizontal head turns eyes closed also mod ant post sway noted.  Tandem gait close supervision 10 ft,  Tandem stance 15 sec then falls, Sharpened romberg 10 sec then fall    Smooth pursuit , saccades intact,  Convergence decreased ability 5 inches       Flowsheet Rows    Flowsheet Row Most Recent Value   PT/OT G-Codes    Current Score 67   Projected Score 64   Assessment Type Evaluation   G code set Mobility: Walking & Moving Around   Mobility: Walking and Moving Around Current Status () CK   Mobility: Walking and Moving Around Goal Status () CJ        PMH: GERD, hiatal hernia, essential HTN, bradycardia, varicose veins of left le, hearing loss since age 23 bilateral hearing aides, 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        Precautions: allergies: sulfa antibiotics, motrin   Hx dvt in le 's wears compression knee highs       Manuals 9/27/23             I eval             Right epley  Man x 2            Retest yung hallpike right              Right bar b q roll f/b right epley next session as needed             Neuro Re-Ed biodex balance testing in future             Tandem gait 1 min x 1            Tandem stance  30 sec x 3            Chin tucks             Cervical isometrics              Ankle sway referencing flat and foam eo ec ,in ll bars close supervision             Foam standing eo, ec, vertical and horizontal head turns              Squats              Ther Ex                                                                                                                     Ther Activity                                       Gait Training                                       Modalities

## 2023-10-02 ENCOUNTER — OFFICE VISIT (OUTPATIENT)
Dept: PHYSICAL THERAPY | Age: 83
End: 2023-10-02
Payer: MEDICARE

## 2023-10-02 DIAGNOSIS — R42 DIZZINESS: Primary | ICD-10-CM

## 2023-10-02 DIAGNOSIS — R26.9 ABNORMALITY OF GAIT: ICD-10-CM

## 2023-10-02 DIAGNOSIS — R42 VERTIGO: ICD-10-CM

## 2023-10-02 PROCEDURE — 97140 MANUAL THERAPY 1/> REGIONS: CPT | Performed by: PHYSICAL THERAPIST

## 2023-10-02 PROCEDURE — 97112 NEUROMUSCULAR REEDUCATION: CPT | Performed by: PHYSICAL THERAPIST

## 2023-10-02 NOTE — PROGRESS NOTES
Daily Note     Today's date: 10/2/2023  Patient name: Destiny Vaca  : 1940  MRN: [id number]  Referring provider: Blake Ramires*  Dx:   Encounter Diagnosis     ICD-10-CM    1. Dizziness  R42       2. Abnormality of gait  R26.9       3. Vertigo  R42           Start Time: 0800  Stop Time: 0900  Total time in clinic (min): 60 minutes    Subjective: Asael Gramajo reports "no dizziness right now, I only get it when I roll over at night" upon arrival to therapy today. she verbalizes compliance with HEP, denying any pain or problems at this time. Objective: See treatment diary below      Assessment: Tolerated treatment well. Patient would benefit from continued PT  PT performing R barbeque roll with patient reporting minimal dizziness when rolling into prone position, resolving within 5 seconds. Patient also reporting minimal dizziness and demonstrating torsional nystagmus lasting 5 seconds with R epley maneuver when rolling onto L side. Patient denying dizziness with any additional exercises performed today, patient instructed to continue current HEP as instructed by primary PT from  with patient verbalizing understanding. Plan: Continue per plan of care. Progress treatment as tolerated.            Precautions: allergies: sulfa antibiotics, motrin   Hx dvt in le 's wears compression knee highs       Manuals 23 10/            I eval             Right epley  Man x 2            Retest yung hallpike right   Done           Right bar b q roll f/b right epley next session as needed  Done           Neuro Re-Ed biodex balance testing in future             Tandem gait 1 min x 1 3 laps           Tandem stance  30 sec x 3 30 sec x 3           Chin tucks             Cervical isometrics              Ankle sway referencing flat and foam eo ec ,in ll bars close supervision             Foam standing eo, ec, vertical and horizontal head turns   EO & EC foam :30x3 ea           VORx1  :30x2 nods           Squats Ther Ex                                                                                                                     Ther Activity                                       Gait Training                                       Modalities

## 2023-10-04 ENCOUNTER — OFFICE VISIT (OUTPATIENT)
Dept: PHYSICAL THERAPY | Age: 83
End: 2023-10-04
Payer: MEDICARE

## 2023-10-04 DIAGNOSIS — R26.9 ABNORMALITY OF GAIT: ICD-10-CM

## 2023-10-04 DIAGNOSIS — R42 DIZZINESS: Primary | ICD-10-CM

## 2023-10-04 PROCEDURE — 97110 THERAPEUTIC EXERCISES: CPT

## 2023-10-04 PROCEDURE — 97112 NEUROMUSCULAR REEDUCATION: CPT

## 2023-10-04 PROCEDURE — 97530 THERAPEUTIC ACTIVITIES: CPT

## 2023-10-04 PROCEDURE — 97140 MANUAL THERAPY 1/> REGIONS: CPT

## 2023-10-04 NOTE — PROGRESS NOTES
Daily Note     Today's date: 10/4/2023  Patient name: Juju Mason  : 1940  MRN: [id number]  Referring provider: Jazz Hutchison*  Dx:   Encounter Diagnosis     ICD-10-CM    1. Dizziness  R42       2. Abnormality of gait  R26.9                      Subjective: "I feel great, I'm 100 percent better."      Objective: See treatment diary below      Assessment: Tolerated treatment well. Patient demonstrated fatigue post treatment . Neg rolling r/l and yung hallpike today. PT issued additional balance exer for hep and neck stabilization exer      Plan: Continue per plan of care. one final session then possible d/c of PT.  Instruct in self epley right and possible right bar b q roll prior to discharge        Precautions: allergies: sulfa antibiotics, motrin   Hx dvt in le 's wears compression knee highs       Manuals 9/27/23 10/2 10/4           I eval             Right epley  Man x 2            Retest yung hallpike right   Done neg          Right bar b q roll f/b right epley next session as needed  Done neg          Neuro Re-Ed biodex balance testing in future             Tandem gait 1 min x 1 3 laps 1 min x 1            Tandem stance  30 sec x 3 30 sec x 3 30 sec x 3          Chin tucks   5 reps 5 sec hold          Cervical isometrics    5 reps 5 sec hold          Ankle sway referencing flat and foam eo ec ,in ll bars close supervision   10 reps each          Foam standing eo, ec, vertical and horizontal head turns   EO & EC foam :30x3 ea Foam standing head turns eyes open and eyes closed 10 reps           VORx1  :30x2 nods perf at home          Squats look thru blinds hi low table 20 inche height   3 x 10          Ther Ex             Proper rolling techniques with use of le   improved technique observed                                                                                                     Ther Activity                                       Gait Training Modalities

## 2023-10-09 ENCOUNTER — OFFICE VISIT (OUTPATIENT)
Dept: PHYSICAL THERAPY | Age: 83
End: 2023-10-09
Payer: MEDICARE

## 2023-10-09 DIAGNOSIS — R26.9 ABNORMALITY OF GAIT: Primary | ICD-10-CM

## 2023-10-09 PROCEDURE — 97110 THERAPEUTIC EXERCISES: CPT

## 2023-10-09 NOTE — PROGRESS NOTES
Daily Note     Today's date: 10/9/2023  Patient name: Juju Mason  : 1940  MRN: [id number]  Referring provider: Jazz Hutchison  Dx:   Encounter Diagnosis     ICD-10-CM    1. Abnormality of gait  R26.9                      Subjective: Pt. Denies dizziness with all activities and with bed mobility. Will DC to HEP to call if changes occur. Objective: See treatment diary below      Assessment: Tolerated treatment well. Patient would benefit from continued PT      Plan: Continue per plan of care.        Precautions: allergies: sulfa antibiotics, motrin   Hx dvt in le 's wears compression knee highs       Manuals 9/27/23 10/2 10/4 10/9          I eval             Right epley  Man x 2            Retest yung hallpike right   Done neg          Right bar b q roll f/b right epley next session as needed  Done neg          Neuro Re-Ed biodex balance testing in future             Tandem gait 1 min x 1 3 laps 1 min x 1 2 min           Tandem stance  30 sec x 3 30 sec x 3 30 sec x 3 20sec x 5         Chin tucks   5 reps 5 sec hold 2 x 10         Cervical isometrics    5 reps 5 sec hold 5sec x 5         Ankle sway referencing flat and foam eo ec ,in ll bars close supervision   10 reps each          Foam standing eo, ec, vertical and horizontal head turns   EO & EC foam :30x3 ea Foam standing head turns eyes open and eyes closed 10 reps           VORx1  :30x2 nods perf at home          Squats look thru blinds hi low table 20 inche height   3 x 10 30x         Ther Ex             Proper rolling techniques with use of le   improved technique observed          opp hand knee    10ft x 6         Side step    10ft x 6         Tandem gait    10ft x 6         Foam over hurdles    10ft x 6                                                Ther Activity                                       Gait Training                                       Modalities

## 2023-10-10 ENCOUNTER — HOSPITAL ENCOUNTER (OUTPATIENT)
Dept: RADIOLOGY | Facility: HOSPITAL | Age: 83
Discharge: HOME | End: 2023-10-10
Attending: ORTHOPAEDIC SURGERY
Payer: MEDICARE

## 2023-10-10 ENCOUNTER — OFFICE VISIT (OUTPATIENT)
Dept: OBGYN CLINIC | Facility: HOSPITAL | Age: 83
End: 2023-10-10
Payer: MEDICARE

## 2023-10-10 VITALS
HEART RATE: 69 BPM | HEIGHT: 64 IN | SYSTOLIC BLOOD PRESSURE: 181 MMHG | BODY MASS INDEX: 34.07 KG/M2 | DIASTOLIC BLOOD PRESSURE: 91 MMHG

## 2023-10-10 DIAGNOSIS — M25.512 CHRONIC LEFT SHOULDER PAIN: Primary | ICD-10-CM

## 2023-10-10 DIAGNOSIS — G89.29 CHRONIC LEFT SHOULDER PAIN: Primary | ICD-10-CM

## 2023-10-10 DIAGNOSIS — M19.012 PRIMARY OSTEOARTHRITIS OF LEFT SHOULDER: ICD-10-CM

## 2023-10-10 DIAGNOSIS — M25.512 LEFT SHOULDER PAIN, UNSPECIFIED CHRONICITY: ICD-10-CM

## 2023-10-10 PROCEDURE — 99203 OFFICE O/P NEW LOW 30 MIN: CPT | Performed by: ORTHOPAEDIC SURGERY

## 2023-10-10 PROCEDURE — 73030 X-RAY EXAM OF SHOULDER: CPT

## 2023-10-10 NOTE — PROGRESS NOTES
Orthopedic Sports Medicine New Patient Visit     Assesment:   82 y.o. female with left shoulder arthritis and left shoulder impingement     Plan:    Imaging reviewed and physical exam performed today. Overall patient is doing well. We discussed continuing to keep the shoulder moving to avoid any stiffness setting in. As patient responded so well to CSI by Dr. Roberto Burch, we discussed if the pain returns, she should schedule with whomever she can be scheduled with soonest. Patient agrees with this. She will return to the office as needed. Follow up:    Return if symptoms worsen or fail to improve. Chief Complaint   Patient presents with   • Left Shoulder - Pain     History of Present Illness: The patient is a right hand dominant 82 y.o. female, presents for follow up evaluation of left shoulder pain. Patient was last seen in 2020 for same issue. Patient notes she was doing well until approximately the end of July. She notes she was unable to use the left arm, couldn't sleep on her left side, and was having a lot of pain. She made an appointment here, and was able to get in to see her PCP sooner. She received CSI on 8/24/23 and reports she is doing much better.  She states she kept the appointment as she had waited long for the    Past Medical, Social and Family History:  Past Medical History:   Diagnosis Date   • Acid reflux    • BPPV (benign paroxysmal positional vertigo)    • DVT (deep venous thrombosis) ([address]) 01/2018   • Hearing loss    • Hypercholesterolemia    • Hypertension    • Impingement syndrome of left shoulder 07/12/2019   • Wears glasses      Past Surgical History:   Procedure Laterality Date   • BREAST BIOPSY Left     1995 left, 1997 unknown side   • COLONOSCOPY     • TONSILLECTOMY     • TUBAL LIGATION     • UPPER GASTROINTESTINAL ENDOSCOPY     • WISDOM TOOTH EXTRACTION  1962     Allergies   Allergen Reactions   • Sulfa Antibiotics Other (See Comments)     Unknown reaction    • Motrin [Ibuprofen] GI Intolerance     If on prescription strength, over the counter she does fine with     Current Outpatient Medications on File Prior to Visit   Medication Sig Dispense Refill   • amLODIPine (NORVASC) 5 mg tablet Take 1 tablet (5 mg total) by mouth 2 (two) times a day 180 tablet 3   • Ascorbic Acid (VITAMIN C) 500 MG CAPS Take 1 capsule by mouth daily     • Calcium Carbonate-Vitamin D3 600-400 MG-UNIT TABS Take by mouth in the morning     • Cholecalciferol (VITAMIN D) 2000 units tablet Take by mouth daily     • famotidine (PEPCID) 20 mg tablet Take 1 tablet (20 mg total) by mouth every morning 90 tablet 3   • folic acid (FOLVITE) 689 MCG tablet Take 1 tablet by mouth daily     • hydrochlorothiazide (HYDRODIURIL) 25 mg tablet Take 1 tablet (25 mg total) by mouth daily 90 tablet 3   • omeprazole (PriLOSEC) 20 mg delayed release capsule Take 1 capsule (20 mg total) by mouth daily 90 capsule 3   • potassium chloride (K-DUR,KLOR-CON) 10 mEq tablet Take 1 tablet (10 mEq total) by mouth daily 90 tablet 3   • prednisoLONE acetate (PRED FORTE) 1 % ophthalmic suspension      • simvastatin (ZOCOR) 20 mg tablet Take 1 tablet (20 mg total) by mouth daily 90 tablet 3   • valsartan (DIOVAN) 160 mg tablet Take 1 tablet (160 mg total) by mouth daily 90 tablet 1     No current facility-administered medications on file prior to visit.      Social History     Socioeconomic History   • Marital status: /Civil Union     Spouse name: Not on file   • Number of children: Not on file   • Years of education: Not on file   • Highest education level: Not on file   Occupational History   • Not on file   Tobacco Use   • Smoking status: Never     Passive exposure: Never   • Smokeless tobacco: Never   Vaping Use   • Vaping Use: Never used   Substance and Sexual Activity   • Alcohol use: Yes     Comment: rare   • Drug use: No   • Sexual activity: Yes     Partners: Male     Birth control/protection: Post-menopausal, None   Other Topics Concern   • Not on file   Social History Narrative   • Not on file     Social Determinants of Health     Financial Resource Strain: Low Risk  (8/17/2023)    Overall Financial Resource Strain (CARDIA)    • Difficulty of Paying Living Expenses: Not hard at all   Food Insecurity: Not on file   Transportation Needs: No Transportation Needs (8/17/2023)    PRAPARE - Transportation    • Lack of Transportation (Medical): No    • Lack of Transportation (Non-Medical): No   Physical Activity: Not on file   Stress: Not on file   Social Connections: Not on file   Intimate Partner Violence: Not on file   Housing Stability: Not on file     I have reviewed the past medical, surgical, social and family history, medications and allergies as documented in the EMR. Review of systems: ROS is negative other than that noted in the HPI. Constitutional: Negative for fatigue and fever. HENT: Negative for sore throat. Respiratory: Negative for shortness of breath. Cardiovascular: Negative for chest pain. Gastrointestinal: Negative for abdominal pain. Endocrine: Negative for cold intolerance and heat intolerance. Genitourinary: Negative for flank pain. Musculoskeletal: Negative for back pain. Negative for numbness and tingling to the left upper extremity  Skin: Negative for rash. Allergic/Immunologic: Negative for immunocompromised state. Neurological: Negative for dizziness. Negative for headaches  Psychiatric/Behavioral: Negative for agitation. Physical Exam:    Blood pressure (!) 181/91, pulse 69, height 5' 4" (1.626 m), not currently breastfeeding.     General/Constitutional: NAD, well developed, well nourished  HENT: Normocephalic, atraumatic  CV: Intact distal pulses, regular rate  Resp: No respiratory distress or labored breathing  Abdomen: soft, nondistended, non tender   Lymphatic: No lymphadenopathy palpated  Neuro: Alert and Oriented x 3, no focal deficits  Psych: Normal mood, normal affect  Skin: Warm, dry, no rashes, no erythema    Shoulder Exam:      Inspection: No ecchymosis, edema, or deformity.  No visualized wounds or skin lesions   Palpation: non tender  Active Motion:       FF: Full       ER: Restricted 15 degrees       IR: Full  Strength: 4+/5/5 empty can, negative/5 ER, full internal rotation/5 IR   Sensory - SILT in the Radial / Ulnar / Median / Axillary nerve distributions  Motor - AIN / PIN / Radial / Ulnar / Median / Axillary motor nerves in tact  Palpable Radial pulse  Cap refill <2secs in all digits  Restriction of rotation on left side    Negative King  Negative Heladio's  Intact belly Press  Negative bear Hug  Negative internal Rotation Lag Sign     Negative Ferris  Negative Speed     Negative apprehension   Positive load and shift   Negative sulcus     Negative Spurling    Imaging    I reviewed and interpreted X-rays of the left shoulder which show mild degenerative changes

## 2023-10-11 ENCOUNTER — APPOINTMENT (OUTPATIENT)
Dept: PHYSICAL THERAPY | Age: 83
End: 2023-10-11
Payer: MEDICARE

## 2023-10-19 ENCOUNTER — HOSPITAL ENCOUNTER (OUTPATIENT)
Dept: MAMMOGRAPHY | Facility: HOSPITAL | Age: 83
Discharge: HOME | End: 2023-10-19
Attending: SURGERY
Payer: MEDICARE

## 2023-10-19 VITALS — BODY MASS INDEX: 33.8 KG/M2 | WEIGHT: 198 LBS | HEIGHT: 64 IN

## 2023-10-19 DIAGNOSIS — Z12.31 SCREENING MAMMOGRAM, ENCOUNTER FOR: ICD-10-CM

## 2023-10-19 PROCEDURE — 77063 BREAST TOMOSYNTHESIS BI: CPT

## 2023-10-19 PROCEDURE — 77067 SCR MAMMO BI INCL CAD: CPT

## 2023-10-31 ENCOUNTER — IMMUNIZATIONS (OUTPATIENT)
Dept: FAMILY MEDICINE CLINIC | Facility: CLINIC | Age: 83
End: 2023-10-31
Payer: MEDICARE

## 2023-10-31 DIAGNOSIS — Z23 ENCOUNTER FOR IMMUNIZATION: Primary | ICD-10-CM

## 2023-10-31 PROCEDURE — 90662 IIV NO PRSV INCREASED AG IM: CPT | Performed by: FAMILY MEDICINE

## 2023-10-31 PROCEDURE — G0008 ADMIN INFLUENZA VIRUS VAC: HCPCS | Performed by: FAMILY MEDICINE

## 2023-11-09 ENCOUNTER — OFFICE VISIT (OUTPATIENT)
Dept: SURGICAL ONCOLOGY | Facility: CLINIC | Age: 83
End: 2023-11-09
Payer: MEDICARE

## 2023-11-09 VITALS
DIASTOLIC BLOOD PRESSURE: 72 MMHG | HEIGHT: 64 IN | SYSTOLIC BLOOD PRESSURE: 124 MMHG | HEART RATE: 61 BPM | OXYGEN SATURATION: 97 % | TEMPERATURE: 97.6 F | RESPIRATION RATE: 15 BRPM | WEIGHT: 198 LBS | BODY MASS INDEX: 33.8 KG/M2

## 2023-11-09 DIAGNOSIS — Z80.3 FAMILY HISTORY OF BREAST CANCER: ICD-10-CM

## 2023-11-09 DIAGNOSIS — N60.19 FIBROCYSTIC BREAST CHANGES, UNSPECIFIED LATERALITY: Primary | ICD-10-CM

## 2023-11-09 DIAGNOSIS — Z12.31 SCREENING MAMMOGRAM, ENCOUNTER FOR: ICD-10-CM

## 2023-11-09 PROCEDURE — 99213 OFFICE O/P EST LOW 20 MIN: CPT | Performed by: SURGERY

## 2023-11-09 NOTE — PROGRESS NOTES
Surgical Oncology Follow Up       [facility] SURGICAL ONCOLOGY [place] [address] Moiz Pino  [address]  1940  [id number]  [facility] SURGICAL ONCOLOGY [facility] [address]    Chief Complaint   Patient presents with    Follow-up       Assessment/Plan   Diagnoses and all orders for this visit:    Fibrocystic breast changes, unspecified laterality    Screening mammogram, encounter for  -     Mammo screening bilateral w 3d & cad; Future    Family history of breast cancer        Advance Care Planning/Advance Directives:  Did not discuss  with the patient. Oncology History:    Oncology History    No history exists. History of Present Illness: Follow-up visit secondary to fibrocystic changes and family history of breast cancer, no current breast referable concerns  -Interval History: Benign mammogram    Review of Systems:  Review of Systems   Constitutional: Negative. Negative for appetite change, fever and unexpected weight change. HENT: Negative. Negative for trouble swallowing. Eyes: Negative. Respiratory: Negative. Negative for cough and shortness of breath. Cardiovascular: Negative. Negative for chest pain. Gastrointestinal: Negative. Negative for abdominal pain, nausea and vomiting. Endocrine: Negative. Genitourinary: Negative. Negative for dysuria. Musculoskeletal: Negative. Negative for arthralgias and myalgias. Skin: Negative. Allergic/Immunologic: Negative. Neurological: Negative. Negative for headaches. Hematological: Negative. Negative for adenopathy. Does not bruise/bleed easily. Psychiatric/Behavioral: Negative.          Patient Active Problem List   Diagnosis    History of DVT of lower extremity    Essential hypertension    Fibrocystic breast changes    Gastroesophageal reflux disease    Hearing loss    Hiatal hernia Hyperlipidemia    Varicose veins of left lower extremity    Bradycardia    Screening mammogram, encounter for    Obesity    Vertigo    Osteoarthritis of glenohumeral joint, left    Osteoarthritis of left acromioclavicular joint    Family history of breast cancer     Past Medical History:   Diagnosis Date    Acid reflux     BPPV (benign paroxysmal positional vertigo)     DVT (deep venous thrombosis) ([facility]) 01/2018    Hearing loss     Hypercholesterolemia     Hypertension     Impingement syndrome of left shoulder 07/12/2019    Wears glasses      Past Surgical History:   Procedure Laterality Date    BREAST BIOPSY Left     1995 left, 1997 unknown side    COLONOSCOPY      TONSILLECTOMY      TUBAL LIGATION      UPPER GASTROINTESTINAL ENDOSCOPY      WISDOM TOOTH EXTRACTION  1962     Family History   Problem Relation Age of Onset    Diabetes Mother     Hypertension Mother     Cancer Father     Diabetes Father     Hypertension Father     Colon cancer Father 72    Congenital heart disease Daughter     Hyperlipidemia Other         pure    Lymphoma Sister 72    Lung cancer Sister 62    Skin cancer Brother 72        Melanoma    No Known Problems Maternal Grandmother     No Known Problems Maternal Grandfather     No Known Problems Paternal Grandmother     No Known Problems Paternal Grandfather     Prostate cancer Brother 72    No Known Problems Daughter     Breast cancer Other 54     Social History     Socioeconomic History    Marital status: /Civil Union     Spouse name: Not on file    Number of children: Not on file    Years of education: Not on file    Highest education level: Not on file   Occupational History    Not on file   Tobacco Use    Smoking status: Never     Passive exposure: Never    Smokeless tobacco: Never   Vaping Use    Vaping Use: Never used   Substance and Sexual Activity    Alcohol use: Yes     Comment: rare    Drug use: No    Sexual activity: Yes     Partners: Male     Birth control/protection: Post-menopausal, None   Other Topics Concern    Not on file   Social History Narrative    Not on file     Social Determinants of Health     Financial Resource Strain: Low Risk  (8/17/2023)    Overall Financial Resource Strain (CARDIA)     Difficulty of Paying Living Expenses: Not hard at all   Food Insecurity: Not on file   Transportation Needs: No Transportation Needs (8/17/2023)    PRAPARE - Transportation     Lack of Transportation (Medical): No     Lack of Transportation (Non-Medical):  No   Physical Activity: Not on file   Stress: Not on file   Social Connections: Not on file   Intimate Partner Violence: Not on file   Housing Stability: Not on file       Current Outpatient Medications:     amLODIPine (NORVASC) 5 mg tablet, Take 1 tablet (5 mg total) by mouth 2 (two) times a day, Disp: 180 tablet, Rfl: 3    Ascorbic Acid (VITAMIN C) 500 MG CAPS, Take 1 capsule by mouth daily, Disp: , Rfl:     Calcium Carbonate-Vitamin D3 600-400 MG-UNIT TABS, Take by mouth in the morning, Disp: , Rfl:     Cholecalciferol (VITAMIN D) 2000 units tablet, Take by mouth daily, Disp: , Rfl:     famotidine (PEPCID) 20 mg tablet, Take 1 tablet (20 mg total) by mouth every morning, Disp: 90 tablet, Rfl: 3    folic acid (FOLVITE) 569 MCG tablet, Take 1 tablet by mouth daily, Disp: , Rfl:     hydrochlorothiazide (HYDRODIURIL) 25 mg tablet, Take 1 tablet (25 mg total) by mouth daily, Disp: 90 tablet, Rfl: 3    omeprazole (PriLOSEC) 20 mg delayed release capsule, Take 1 capsule (20 mg total) by mouth daily, Disp: 90 capsule, Rfl: 3    potassium chloride (K-DUR,KLOR-CON) 10 mEq tablet, Take 1 tablet (10 mEq total) by mouth daily, Disp: 90 tablet, Rfl: 3    prednisoLONE acetate (PRED FORTE) 1 % ophthalmic suspension, , Disp: , Rfl:     simvastatin (ZOCOR) 20 mg tablet, Take 1 tablet (20 mg total) by mouth daily, Disp: 90 tablet, Rfl: 3    valsartan (DIOVAN) 160 mg tablet, Take 1 tablet (160 mg total) by mouth daily, Disp: 90 tablet, Rfl: 1  Allergies   Allergen Reactions    Sulfa Antibiotics Other (See Comments)     Unknown reaction     Motrin [Ibuprofen] GI Intolerance     If on prescription strength, over the counter she does fine with       The following portions of the patient's history were reviewed and updated as appropriate: allergies, current medications, past family history, past medical history, past social history, past surgical history, and problem list.        Vitals:    11/09/23 0820   BP: 124/72   Pulse: 61   Resp: 15   Temp: 97.6 °F (36.4 °C)   SpO2: 97%       Physical Exam  Constitutional:       General: She is not in acute distress. Appearance: Normal appearance. She is well-developed. HENT:      Head: Normocephalic and atraumatic. Chest:   Breasts:     Right: No swelling, bleeding, inverted nipple, mass, nipple discharge, skin change or tenderness. Left: No swelling, bleeding, inverted nipple, mass, nipple discharge, skin change or tenderness. Lymphadenopathy:      Upper Body:      Right upper body: No supraclavicular, axillary or pectoral adenopathy. Left upper body: No supraclavicular, axillary or pectoral adenopathy. Neurological:      Mental Status: She is alert and oriented to person, place, and time. Psychiatric:         Mood and Affect: Mood normal.           Results:  Labs:      Imaging  10/19/2023 bilateral 3D screening mammogram was benign    I reviewed the above imaging data. Discussion/Summary: 77-year-old female with fibrocystic changes and family history of breast cancer. There are no concerns on exam today. Her recent mammogram was benign. We will plan to see her again in 1 year following her mammogram or sooner should the need arise.

## 2023-12-11 DIAGNOSIS — I10 ESSENTIAL HYPERTENSION: ICD-10-CM

## 2023-12-12 RX ORDER — VALSARTAN 160 MG/1
160 TABLET ORAL DAILY
Qty: 90 TABLET | Refills: 1 | Status: SHIPPED | OUTPATIENT
Start: 2023-12-12

## 2024-02-19 ENCOUNTER — RA CDI HCC (OUTPATIENT)
Dept: OTHER | Facility: HOSPITAL | Age: 84
End: 2024-02-19

## 2024-02-25 NOTE — PROGRESS NOTES
Name: Bharati Rico      : 1940      MRN: [id number]  Encounter Provider: Jonathon Pope MD  Encounter Date: 2024   Encounter department: [facility]    Assessment & Plan     1. Essential hypertension  -     hydroCHLOROthiazide 25 mg tablet; Take 1 tablet (25 mg total) by mouth daily  -     potassium chloride (Klor-Con M10) 10 mEq tablet; Take 1 tablet (10 mEq total) by mouth daily  -     valsartan (DIOVAN) 160 mg tablet; Take 1 tablet (160 mg total) by mouth daily  -     amLODIPine (NORVASC) 5 mg tablet; Take 1 tablet (5 mg total) by mouth 2 (two) times a day    2. Pure hypercholesterolemia  -     amLODIPine (NORVASC) 5 mg tablet; Take 1 tablet (5 mg total) by mouth 2 (two) times a day    3. Prediabetes    4. Gastroesophageal reflux disease without esophagitis  -     famotidine (PEPCID) 20 mg tablet; Take 1 tablet (20 mg total) by mouth every morning    5. Hiatal hernia    Continue with current medications.  Monitor BP at home.  Repeat labs.  Office visit 6 months.       Subjective     Follow up visit.  Medications reviewed.  Hypertension blood pressures have been stable on Amlodipine 5 mg BID, Valsartan 160 mg daily and HCTZ 25 mg daily.  Last labs 2023 creatinine 0.92. GFR 58. Electrolytes normal except for chloride 111.  K+ 4.7. on K+ supplement. Hgb 14.8  Hyperlipidemia on Simvastatin 20 mg/day- 2023  lipid profile cholesterol 192. Triglycerides 76 HDL 67. . LFTs normal.  Prediabetes . 2023 A1c 5.7 .       Review of Systems   Constitutional:  Positive for unexpected weight change (4 lb weight gain from 2023). Negative for appetite change, chills, fatigue and fever.   HENT:  Positive for hearing loss (bilateral hearing aids. ). Negative for congestion, ear pain, postnasal drip, rhinorrhea, sore throat and trouble swallowing.         Prior ENT evaluation for LPR   Eyes:  Negative for visual disturbance.   Respiratory:  Negative for cough, shortness of breath  and wheezing.    Cardiovascular:  Negative for chest pain, palpitations and leg swelling.        Past history of DVT left leg treated with Eliquis. No recurrence. No FH venous thrombosis.    Gastrointestinal:  Negative for abdominal pain, blood in stool, constipation, diarrhea, nausea and vomiting.        GERD stable on Famotidine 20 mg daily. She uses  prn  Omeprazole 20 mg  No dysphagia. No history of Mauro's. 04/2021 EGD Normal except for hiatal hernia. 04/2021 colonoscopy normal except for mild diverticulosis sigmoid colon   Endocrine:        Lab Results       Component                Value               Date                       [id number]             1.550               03/18/2021               Genitourinary:  Negative for difficulty urinating.   Musculoskeletal:  Negative for arthralgias and myalgias.   Skin:  Negative for rash.   Neurological:  Negative for dizziness and headaches.   Hematological:  Negative for adenopathy. Does not bruise/bleed easily.   Psychiatric/Behavioral:  Negative for dysphoric mood and sleep disturbance.        Past Medical History:   Diagnosis Date    Acid reflux     Allergic unknown    Sulfa, Motrin    BPPV (benign paroxysmal positional vertigo)     DVT (deep venous thrombosis) ([facility]) 01/2018    GERD (gastroesophageal reflux disease) unknown    Hearing loss     HL (hearing loss) unknown    ear problems since 1 1/2 years old.  Aids in 1966.    Hypercholesterolemia     Hypertension     Impingement syndrome of left shoulder 07/12/2019    Shingles 2009    had vaccine in 2010; shringrix given 2019 and 2020    Wears glasses      Past Surgical History:   Procedure Laterality Date    BREAST BIOPSY Left     1995 left, 1997 unknown side    COLONOSCOPY      TONSILLECTOMY      TUBAL LIGATION      UPPER GASTROINTESTINAL ENDOSCOPY      WISDOM TOOTH EXTRACTION  1962     Family History   Problem Relation Age of Onset    Diabetes Mother     Hypertension Mother     Hearing loss Mother               Cancer Father         Colon    Diabetes Father     Hypertension Father     Colon cancer Father 65    Congenital heart disease Daughter     Hyperlipidemia Other         pure    Lymphoma Sister 65    Lung cancer Sister 57    Hypertension Sister     Diabetes Sister     Cancer Sister         Lung and non-Hodgkin    Skin cancer Brother 65        Melanoma    No Known Problems Maternal Grandmother     No Known Problems Maternal Grandfather     No Known Problems Paternal Grandmother     No Known Problems Paternal Grandfather     Prostate cancer Brother 65    No Known Problems Daughter     Breast cancer Other 55    Hypertension Brother     Cancer Brother         Prostate    Hypertension Brother     Cancer Brother         Skin    Glaucoma Brother      Social History     Socioeconomic History    Marital status: /Civil Union     Spouse name: None    Number of children: None    Years of education: None    Highest education level: None   Occupational History    None   Tobacco Use    Smoking status: Never     Passive exposure: Never    Smokeless tobacco: Never   Vaping Use    Vaping status: Never Used   Substance and Sexual Activity    Alcohol use: Yes     Alcohol/week: 1.0 standard drink of alcohol     Types: 1 Glasses of wine per week     Comment: Not weekly, maybe monthly, if that,  one glass of wine    Drug use: No    Sexual activity: Yes     Partners: Male     Birth control/protection: Post-menopausal, Female Sterilization   Other Topics Concern    None   Social History Narrative    None     Social Determinants of Health     Financial Resource Strain: Low Risk  (2023)    Overall Financial Resource Strain (CARDIA)     Difficulty of Paying Living Expenses: Not hard at all   Food Insecurity: Not on file   Transportation Needs: No Transportation Needs (2023)    PRAPARE - Transportation     Lack of Transportation (Medical): No     Lack of Transportation (Non-Medical): No   Physical Activity: Not  on file   Stress: Not on file   Social Connections: Not on file   Intimate Partner Violence: Not on file   Housing Stability: Not on file     Current Outpatient Medications on File Prior to Visit   Medication Sig    Ascorbic Acid (VITAMIN C) 500 MG CAPS Take 1 capsule by mouth daily    Calcium Carbonate-Vitamin D3 600-400 MG-UNIT TABS Take by mouth in the morning    Cholecalciferol (VITAMIN D) 2000 units tablet Take by mouth daily    folic acid (FOLVITE) 800 MCG tablet Take 1 tablet by mouth daily    omeprazole (PriLOSEC) 20 mg delayed release capsule Take 1 capsule (20 mg total) by mouth daily    prednisoLONE acetate (PRED FORTE) 1 % ophthalmic suspension     simvastatin (ZOCOR) 20 mg tablet Take 1 tablet (20 mg total) by mouth daily    [DISCONTINUED] amLODIPine (NORVASC) 5 mg tablet Take 1 tablet (5 mg total) by mouth 2 (two) times a day    [DISCONTINUED] famotidine (PEPCID) 20 mg tablet Take 1 tablet (20 mg total) by mouth every morning    [DISCONTINUED] hydrochlorothiazide (HYDRODIURIL) 25 mg tablet Take 1 tablet (25 mg total) by mouth daily    [DISCONTINUED] potassium chloride (K-DUR,KLOR-CON) 10 mEq tablet Take 1 tablet (10 mEq total) by mouth daily    [DISCONTINUED] valsartan (DIOVAN) 160 mg tablet TAKE 1 TABLET BY MOUTH EVERY DAY     Allergies   Allergen Reactions    Sulfa Antibiotics Other (See Comments)     Unknown reaction     Motrin [Ibuprofen] GI Intolerance     If on prescription strength, over the counter she does fine with     Immunization History   Administered Date(s) Administered    COVID-19 PFIZER VACCINE 0.3 ML IM 02/28/2021, 03/21/2021    Hep A, adult 10/05/2019, 10/21/2019    INFLUENZA 09/26/2011, 09/17/2012, 10/21/2013, 10/03/2014, 10/08/2015, 10/18/2016, 09/19/2017, 10/09/2018, 09/26/2019    Influenza, high dose seasonal 0.7 mL 10/28/2020, 11/03/2021, 10/31/2023    Pneumococcal Conjugate 13-Valent 12/16/2015, 09/19/2017    Pneumococcal Polysaccharide PPV23 10/17/2007    Tdap 09/19/2019     "Zoster 03/18/2009    Zoster Vaccine Recombinant 09/05/2019, 01/20/2020, 03/12/2020       Objective     /78 (BP Location: Left arm, Patient Position: Sitting, Cuff Size: Large)   Pulse 70   Temp (!) 95.1 °F (35.1 °C)   Resp 16   Ht 5' 4\" (1.626 m)   Wt 91.6 kg (202 lb)   SpO2 96%   BMI 34.67 kg/m²     BP Readings from Last 3 Encounters:   02/26/24 140/78   11/09/23 124/72   10/10/23 (!) 181/91      Wt Readings from Last 3 Encounters:   02/26/24 91.6 kg (202 lb)   11/09/23 89.8 kg (198 lb)   10/19/23 89.8 kg (198 lb)         Physical Exam  Vitals and nursing note reviewed.   Constitutional:       General: She is not in acute distress.     Appearance: She is well-developed.   HENT:      Mouth/Throat:      Mouth: No oral lesions.      Pharynx: Oropharynx is clear.   Eyes:      General: No scleral icterus.     Extraocular Movements: Extraocular movements intact.      Conjunctiva/sclera: Conjunctivae normal.      Pupils: Pupils are equal, round, and reactive to light.   Neck:      Thyroid: No thyroid mass or thyromegaly.      Vascular: Normal carotid pulses. No carotid bruit or JVD.      Trachea: No tracheal deviation.   Cardiovascular:      Rate and Rhythm: Normal rate and regular rhythm.      Heart sounds: Normal heart sounds. No murmur heard.     No gallop.   Pulmonary:      Effort: Pulmonary effort is normal. No respiratory distress.      Breath sounds: Normal breath sounds. No wheezing or rales.   Musculoskeletal:      Right lower leg: No edema.      Left lower leg: No edema.   Lymphadenopathy:      Cervical: No cervical adenopathy.      Upper Body:      Right upper body: No supraclavicular adenopathy.      Left upper body: No supraclavicular adenopathy.   Skin:     Findings: No rash.      Nails: There is no clubbing.   Neurological:      General: No focal deficit present.      Mental Status: She is alert and oriented to person, place, and time.   Psychiatric:         Mood and Affect: Mood normal. "       Jonathon Pope MD

## 2024-02-26 ENCOUNTER — OFFICE VISIT (OUTPATIENT)
Dept: FAMILY MEDICINE CLINIC | Facility: CLINIC | Age: 84
End: 2024-02-26
Payer: MEDICARE

## 2024-02-26 VITALS
HEIGHT: 64 IN | BODY MASS INDEX: 34.49 KG/M2 | DIASTOLIC BLOOD PRESSURE: 78 MMHG | RESPIRATION RATE: 16 BRPM | TEMPERATURE: 95.1 F | HEART RATE: 70 BPM | WEIGHT: 202 LBS | SYSTOLIC BLOOD PRESSURE: 140 MMHG | OXYGEN SATURATION: 96 %

## 2024-02-26 DIAGNOSIS — I10 ESSENTIAL HYPERTENSION: Primary | ICD-10-CM

## 2024-02-26 DIAGNOSIS — K21.9 GASTROESOPHAGEAL REFLUX DISEASE WITHOUT ESOPHAGITIS: ICD-10-CM

## 2024-02-26 DIAGNOSIS — R73.03 PREDIABETES: ICD-10-CM

## 2024-02-26 DIAGNOSIS — K44.9 HIATAL HERNIA: ICD-10-CM

## 2024-02-26 DIAGNOSIS — E78.00 PURE HYPERCHOLESTEROLEMIA: ICD-10-CM

## 2024-02-26 PROBLEM — Z12.31 SCREENING MAMMOGRAM, ENCOUNTER FOR: Status: RESOLVED | Noted: 2020-11-05 | Resolved: 2024-02-26

## 2024-02-26 PROBLEM — R00.1 BRADYCARDIA: Status: RESOLVED | Noted: 2020-05-19 | Resolved: 2024-02-26

## 2024-02-26 PROCEDURE — 99214 OFFICE O/P EST MOD 30 MIN: CPT | Performed by: FAMILY MEDICINE

## 2024-02-26 RX ORDER — POTASSIUM CHLORIDE 750 MG/1
10 TABLET, EXTENDED RELEASE ORAL DAILY
Qty: 90 TABLET | Refills: 3 | Status: SHIPPED | OUTPATIENT
Start: 2024-02-26

## 2024-02-26 RX ORDER — VALSARTAN 160 MG/1
160 TABLET ORAL DAILY
Qty: 90 TABLET | Refills: 3 | Status: SHIPPED | OUTPATIENT
Start: 2024-02-26

## 2024-02-26 RX ORDER — FAMOTIDINE 20 MG/1
20 TABLET, FILM COATED ORAL EVERY MORNING
Qty: 90 TABLET | Refills: 3 | Status: SHIPPED | OUTPATIENT
Start: 2024-02-26

## 2024-02-26 RX ORDER — HYDROCHLOROTHIAZIDE 25 MG/1
25 TABLET ORAL DAILY
Qty: 90 TABLET | Refills: 3 | Status: SHIPPED | OUTPATIENT
Start: 2024-02-26

## 2024-02-26 RX ORDER — AMLODIPINE BESYLATE 5 MG/1
5 TABLET ORAL 2 TIMES DAILY
Qty: 180 TABLET | Refills: 3 | Status: SHIPPED | OUTPATIENT
Start: 2024-02-26

## 2024-04-23 DIAGNOSIS — E78.00 PURE HYPERCHOLESTEROLEMIA: ICD-10-CM

## 2024-04-23 RX ORDER — SIMVASTATIN 20 MG
20 TABLET ORAL DAILY
Qty: 90 TABLET | Refills: 1 | Status: SHIPPED | OUTPATIENT
Start: 2024-04-23

## 2024-04-23 NOTE — TELEPHONE ENCOUNTER
Reason for call:   [x] Refill   [] Prior Auth  [] Other:     Office:   [x] PCP/Provider - Hodan GIRON / Niko  [] Specialty/Provider -     Medication: simvastatin    Dose/Frequency: 20mg qd    Quantity: 90    Pharmacy: [id number] - [place], [place] - [address]     Does the patient have enough for 3 days?   [x] Yes   [] No - Send as HP to POD'

## 2024-07-01 DIAGNOSIS — K21.9 GASTROESOPHAGEAL REFLUX DISEASE WITHOUT ESOPHAGITIS: ICD-10-CM

## 2024-07-01 RX ORDER — OMEPRAZOLE 20 MG/1
20 CAPSULE, DELAYED RELEASE ORAL DAILY
Qty: 90 CAPSULE | Refills: 1 | Status: SHIPPED | OUTPATIENT
Start: 2024-07-01

## 2024-07-09 ENCOUNTER — APPOINTMENT (OUTPATIENT)
Dept: LAB | Facility: CLINIC | Age: 84
End: 2024-07-09
Payer: MEDICARE

## 2024-07-09 LAB
ALBUMIN SERPL BCG-MCNC: 3.8 G/DL (ref 3.5–5)
ALP SERPL-CCNC: 67 U/L (ref 34–104)
ALT SERPL W P-5'-P-CCNC: 12 U/L (ref 7–52)
ANION GAP SERPL CALCULATED.3IONS-SCNC: 10 MMOL/L (ref 4–13)
AST SERPL W P-5'-P-CCNC: 23 U/L (ref 13–39)
BASOPHILS # BLD AUTO: 0.04 THOUSANDS/ÂΜL (ref 0–0.1)
BASOPHILS NFR BLD AUTO: 1 % (ref 0–1)
BILIRUB SERPL-MCNC: 0.63 MG/DL (ref 0.2–1)
BUN SERPL-MCNC: 18 MG/DL (ref 5–25)
CALCIUM SERPL-MCNC: 9.2 MG/DL (ref 8.4–10.2)
CHLORIDE SERPL-SCNC: 104 MMOL/L (ref 96–108)
CHOLEST SERPL-MCNC: 178 MG/DL
CO2 SERPL-SCNC: 25 MMOL/L (ref 21–32)
CREAT SERPL-MCNC: 0.76 MG/DL (ref 0.6–1.3)
EOSINOPHIL # BLD AUTO: 0.2 THOUSAND/ÂΜL (ref 0–0.61)
EOSINOPHIL NFR BLD AUTO: 4 % (ref 0–6)
ERYTHROCYTE [DISTWIDTH] IN BLOOD BY AUTOMATED COUNT: 13.6 % (ref 11.6–15.1)
EST. AVERAGE GLUCOSE BLD GHB EST-MCNC: 123 MG/DL
GFR SERPL CREATININE-BSD FRML MDRD: 73 ML/MIN/1.73SQ M
GLUCOSE P FAST SERPL-MCNC: 92 MG/DL (ref 65–99)
HBA1C MFR BLD: 5.9 %
HCT VFR BLD AUTO: 44.9 % (ref 34.8–46.1)
HDLC SERPL-MCNC: 63 MG/DL
HGB BLD-MCNC: 14.3 G/DL (ref 11.5–15.4)
IMM GRANULOCYTES # BLD AUTO: 0.01 THOUSAND/UL (ref 0–0.2)
IMM GRANULOCYTES NFR BLD AUTO: 0 % (ref 0–2)
LDLC SERPL CALC-MCNC: 95 MG/DL (ref 0–100)
LYMPHOCYTES # BLD AUTO: 1.63 THOUSANDS/ÂΜL (ref 0.6–4.47)
LYMPHOCYTES NFR BLD AUTO: 30 % (ref 14–44)
MCH RBC QN AUTO: 27.8 PG (ref 26.8–34.3)
MCHC RBC AUTO-ENTMCNC: 31.8 G/DL (ref 31.4–37.4)
MCV RBC AUTO: 87 FL (ref 82–98)
MONOCYTES # BLD AUTO: 0.54 THOUSAND/ÂΜL (ref 0.17–1.22)
MONOCYTES NFR BLD AUTO: 10 % (ref 4–12)
NEUTROPHILS # BLD AUTO: 3.1 THOUSANDS/ÂΜL (ref 1.85–7.62)
NEUTS SEG NFR BLD AUTO: 55 % (ref 43–75)
NONHDLC SERPL-MCNC: 115 MG/DL
NRBC BLD AUTO-RTO: 0 /100 WBCS
PLATELET # BLD AUTO: 203 THOUSANDS/UL (ref 149–390)
PMV BLD AUTO: 11.8 FL (ref 8.9–12.7)
POTASSIUM SERPL-SCNC: 4.6 MMOL/L (ref 3.5–5.3)
PROT SERPL-MCNC: 6.6 G/DL (ref 6.4–8.4)
RBC # BLD AUTO: 5.15 MILLION/UL (ref 3.81–5.12)
SODIUM SERPL-SCNC: 139 MMOL/L (ref 135–147)
TRIGL SERPL-MCNC: 102 MG/DL
WBC # BLD AUTO: 5.52 THOUSAND/UL (ref 4.31–10.16)

## 2024-07-10 ENCOUNTER — ANNUAL EXAM (OUTPATIENT)
Dept: OBGYN CLINIC | Facility: CLINIC | Age: 84
End: 2024-07-10
Payer: MEDICARE

## 2024-07-10 ENCOUNTER — OFFICE VISIT (OUTPATIENT)
Dept: GASTROENTEROLOGY | Facility: AMBULARY SURGERY CENTER | Age: 84
End: 2024-07-10
Payer: MEDICARE

## 2024-07-10 VITALS
HEART RATE: 58 BPM | OXYGEN SATURATION: 100 % | SYSTOLIC BLOOD PRESSURE: 148 MMHG | BODY MASS INDEX: 32.95 KG/M2 | HEIGHT: 64 IN | WEIGHT: 193 LBS | DIASTOLIC BLOOD PRESSURE: 78 MMHG

## 2024-07-10 VITALS
HEIGHT: 64 IN | DIASTOLIC BLOOD PRESSURE: 78 MMHG | BODY MASS INDEX: 33.09 KG/M2 | WEIGHT: 193.8 LBS | SYSTOLIC BLOOD PRESSURE: 150 MMHG

## 2024-07-10 DIAGNOSIS — K62.5 BLOOD PER RECTUM: ICD-10-CM

## 2024-07-10 DIAGNOSIS — N83.201 CYST OF RIGHT OVARY: ICD-10-CM

## 2024-07-10 DIAGNOSIS — Z12.39 ENCOUNTER FOR SCREENING FOR MALIGNANT NEOPLASM OF BREAST, UNSPECIFIED SCREENING MODALITY: ICD-10-CM

## 2024-07-10 DIAGNOSIS — Z01.419 ENCOUNTER FOR ANNUAL ROUTINE GYNECOLOGICAL EXAMINATION: Primary | ICD-10-CM

## 2024-07-10 DIAGNOSIS — R19.5 LOOSE STOOLS: Primary | ICD-10-CM

## 2024-07-10 PROCEDURE — G0101 CA SCREEN;PELVIC/BREAST EXAM: HCPCS | Performed by: OBSTETRICS & GYNECOLOGY

## 2024-07-10 PROCEDURE — 99203 OFFICE O/P NEW LOW 30 MIN: CPT | Performed by: PHYSICIAN ASSISTANT

## 2024-07-10 NOTE — PROGRESS NOTES
[facility] - Outpatient Consultation  Bharati Rico 83 y.o. female MRN: [id number]  Encounter: [id number]          ASSESSMENT AND PLAN:      83-year-old female who presents the office as a new patient for loose stools after recent vacation.     Loose stool  Patient reports after coming home from vacation she noticed her bowel movements are slightly looser.  She is still only having 1 a day however reports needing to wipe more than usual to clean.  She does report noticing small amount of blood with wiping yesterday for the first time.  Suspect some looser stools in the setting of dietary changes, no true diarrhea to have concern for infectious etiology.    -Recommend starting a fiber supplement such as Metamucil 1 tablespoon daily.  -Recommend monitoring over the next few weeks while she gets back to her normal schedule to see if improvement of stools.  - Advised patient that if bowel movements do not improve, worsen or any recurrence of blood, consider reaching out to colorectal for repeat colonoscopy.  This was last done in 2021 with repeat recommended in 5 years, however advised I would recommend this sooner if symptoms persist.     2. GERD  Reports longstanding history of reflux symptoms.  She reports this is controlled with Pepcid as needed.  She reports prior EGD around 2021 with outside GI provider which was negative for Mauro's esophagus.    -Continue Pepcid as needed  -Continue to avoid food triggers      Patient was recommended to follow up based on if symptoms do not improve with fiber. Patient was recommended to reach out via [employer] with any questions or concerns in the meantime.   ______________________________________________________________________    HPI:      Bharati Rico is a 83 y.o. female with hypertension, hyperlipidemia presents the office as a new patient for loose stools.    Patient reports going to [facility] from [place] 3 to June 12.  She reports when she was  there she was eating more fruits and vegetables than normal.  She reports when she got back she noticed her stool habits were slightly changed.  She reports having 1 bowel movement daily still however reports it was slightly looser.  She reports needing to wipe more than normal to clean.  She also reports it appeared at first that stools had some red discoloration however she attributes this to the food she was eating such as cherries.  Yesterday she does report noticing a small amount of blood when wiping.  She has a history of hemorrhoids in 2016 and underwent banding.  Otherwise she denies any abdominal pain.    She reports following with outside GI provider and was diagnosed with GERD.  She reports use of Pepcid as needed.    Family history of colon cancer in her father.    Patient had lab work yesterday showed normal hemoglobin of 14.3.  CMP was normal.  Prior TSH last year was normal.    Patient had colonoscopy with colorectal 4/2021.  Diverticulosis was noted.  Otherwise unremarkable.  Repeat was recommended in 5 years due to history of polyps. Prior colonoscopy in 2016 with internal and external hemorrhoids noted.     REVIEW OF SYSTEMS:    CONSTITUTIONAL: Denies any fever, chills, rigors, and weight loss.  HEENT: No earache or tinnitus. Denies hearing loss or visual disturbances.  CARDIOVASCULAR: No chest pain or palpitations.   RESPIRATORY: Denies any cough, hemoptysis, shortness of breath or dyspnea on exertion.  GASTROINTESTINAL: As noted in the History of Present Illness.   GENITOURINARY: No problems with urination. Denies any hematuria or dysuria.  NEUROLOGIC: No dizziness or vertigo, denies headaches.   MUSCULOSKELETAL: Denies any muscle or joint pain.   SKIN: Denies skin rashes or itching.   ENDOCRINE: Denies excessive thirst. Denies intolerance to heat or cold.  PSYCHOSOCIAL: Denies depression or anxiety. Denies any recent memory loss.       Historical Information   Past Medical History:   Diagnosis  Date    Acid reflux     Allergic unknown    Sulfa, Motrin    BPPV (benign paroxysmal positional vertigo)     DVT (deep venous thrombosis) ([facility]) 2018    GERD (gastroesophageal reflux disease) unknown    Hearing loss     HL (hearing loss) unknown    ear problems since 3 1/2 years old.  Aids in 1966.    Hypercholesterolemia     Hypertension     Impingement syndrome of left shoulder 2019    Shingles 2009    had vaccine in ; shringrix given  and     Wears glasses      Past Surgical History:   Procedure Laterality Date    BREAST BIOPSY Left      left,  unknown side    COLONOSCOPY      TONSILLECTOMY      TUBAL LIGATION      UPPER GASTROINTESTINAL ENDOSCOPY      WISDOM TOOTH EXTRACTION       Social History   Social History     Substance and Sexual Activity   Alcohol Use Yes    Alcohol/week: 1.0 standard drink of alcohol    Types: 1 Glasses of wine per week    Comment: Not weekly, maybe monthly, if that,  one glass of wine     Social History     Substance and Sexual Activity   Drug Use No     Social History     Tobacco Use   Smoking Status Never    Passive exposure: Never   Smokeless Tobacco Never     Family History   Problem Relation Age of Onset    Diabetes Mother     Hypertension Mother     Hearing loss Mother              Cancer Father         Colon    Diabetes Father     Hypertension Father     Colon cancer Father 65    Congenital heart disease Daughter     Hyperlipidemia Other         pure    Lymphoma Sister 65    Lung cancer Sister 57    Hypertension Sister     Diabetes Sister     Cancer Sister         Lung and non-Hodgkin    Skin cancer Brother 65        Melanoma    No Known Problems Maternal Grandmother     No Known Problems Maternal Grandfather     No Known Problems Paternal Grandmother     No Known Problems Paternal Grandfather     Prostate cancer Brother 65    No Known Problems Daughter     Breast cancer Other 55    Hypertension Brother     Cancer Brother          "Prostate    Hypertension Brother     Cancer Brother         Skin    Glaucoma Brother        Meds/Allergies       Current Outpatient Medications:     amLODIPine (NORVASC) 5 mg tablet    Ascorbic Acid (VITAMIN C) 500 MG CAPS    Calcium Carbonate-Vitamin D3 600-400 MG-UNIT TABS    Cholecalciferol (VITAMIN D) 2000 units tablet    famotidine (PEPCID) 20 mg tablet    folic acid (FOLVITE) 800 MCG tablet    hydroCHLOROthiazide 25 mg tablet    omeprazole (PriLOSEC) 20 mg delayed release capsule    potassium chloride (Klor-Con M10) 10 mEq tablet    prednisoLONE acetate (PRED FORTE) 1 % ophthalmic suspension    simvastatin (ZOCOR) 20 mg tablet    valsartan (DIOVAN) 160 mg tablet    Allergies   Allergen Reactions    Sulfa Antibiotics Other (See Comments)     Unknown reaction     Motrin [Ibuprofen] GI Intolerance     If on prescription strength, over the counter she does fine with           Objective     Blood pressure 148/78, pulse 58, height 5' 4\" (1.626 m), weight 87.5 kg (193 lb), SpO2 100%, not currently breastfeeding. Body mass index is 33.13 kg/m².        PHYSICAL EXAM:      General Appearance:   Alert, cooperative, no distress   HEENT:   Normocephalic, atraumatic, anicteric.     Neck:  Supple, symmetrical, trachea midline   Lungs:   Clear to auscultation bilaterally; no rales, rhonchi or wheezing; respirations unlabored    Heart::   Regular rate and rhythm; no murmur, rub, or gallop.   Abdomen:   Soft, non-tender, non-distended; normal bowel sounds; no masses, no organomegaly. Benign abdomen.    Genitalia:   Deferred    Rectal:   Deferred    Extremities:  No cyanosis, clubbing or edema    Pulses:  2+ and symmetric    Skin:  No jaundice, rashes, or lesions    Lymph nodes:  No palpable cervical lymphadenopathy        Lab Results:   No visits with results within 1 Day(s) from this visit.   Latest known visit with results is:   Office Visit on 08/24/2023   Component Date Value    Sodium 07/09/2024 139     Potassium " 07/09/2024 4.6     Chloride 07/09/2024 104     CO2 07/09/2024 25     ANION GAP 07/09/2024 10     BUN 07/09/2024 18     Creatinine 07/09/2024 0.76     Glucose, Fasting 07/09/2024 92     Calcium 07/09/2024 9.2     AST 07/09/2024 23     ALT 07/09/2024 12     Alkaline Phosphatase 07/09/2024 67     Total Protein 07/09/2024 6.6     Albumin 07/09/2024 3.8     Total Bilirubin 07/09/2024 0.63     eGFR 07/09/2024 73     WBC 07/09/2024 5.52     RBC 07/09/2024 5.15 (H)     Hemoglobin 07/09/2024 14.3     Hematocrit 07/09/2024 44.9     MCV 07/09/2024 87     MCH 07/09/2024 27.8     MCHC 07/09/2024 31.8     RDW 07/09/2024 13.6     MPV 07/09/2024 11.8     Platelets 07/09/2024 203     nRBC 07/09/2024 0     Segmented % 07/09/2024 55     Immature Grans % 07/09/2024 0     Lymphocytes % 07/09/2024 30     Monocytes % 07/09/2024 10     Eosinophils Relative 07/09/2024 4     Basophils Relative 07/09/2024 1     Absolute Neutrophils 07/09/2024 3.10     Absolute Immature Grans 07/09/2024 0.01     Absolute Lymphocytes 07/09/2024 1.63     Absolute Monocytes 07/09/2024 0.54     Eosinophils Absolute 07/09/2024 0.20     Basophils Absolute 07/09/2024 0.04     Cholesterol 07/09/2024 178     Triglycerides 07/09/2024 102     HDL, Direct 07/09/2024 63     LDL Calculated 07/09/2024 95     Non-HDL-Chol (CHOL-HDL) 07/09/2024 115     Hemoglobin A1C 07/09/2024 5.9 (H)     EAG 07/09/2024 123          Radiology Results:   No results found.

## 2024-07-10 NOTE — PROGRESS NOTES
Ambulatory Visit  Name: Bharati Rico      : 1940      MRN: [id number]  Encounter Provider: Rossana Mccullough DO  Encounter Date: 7/10/2024   Encounter department: [facility]    Assessment & Plan   1. Encounter for annual routine gynecological examination    Pap smear deferred due to low risk status.  Encouraged self breast examination as well as calcium supplementation.  Continue annual mammogram, already scheduled.  Reviewed colon cancer screening, up-to-date.  Will obtain pelvic ultrasound follow-up small right ovarian cyst.  Return to office in 1 year or as needed    History of Present Illness     Bharati Rico is a 83 y.o. female who presents     This is a pleasant 83-year-old female P3 ( x 3) presents for her GYN exam.  She went through menopause at age 53.  She was on hormones for approximately 2 years.  She denies any vaginal bleeding or spotting.  No changes in bowel or bladder function.  She has been in a monogamous relationship with her  for over 54 years.  Pap smears have been normal.  She continues to follow-up with her breast specialist on an annual basis, history of fibrocystic breast disease.  She also follows up with her family physician on a regular basis.    She does have a history of a small asymptomatic right simple ovarian cyst last ultrasound .    Mammo 10/2023, scheduled     Colon 2021 f/u 5 yrs    Review of Systems   Constitutional:  Negative for fatigue, fever and unexpected weight change.   Respiratory:  Negative for cough, chest tightness, shortness of breath and wheezing.    Cardiovascular: Negative.  Negative for chest pain and palpitations.   Gastrointestinal: Negative.  Negative for abdominal distention, abdominal pain, blood in stool, constipation, diarrhea, nausea and vomiting.   Genitourinary: Negative.  Negative for difficulty urinating, dyspareunia, dysuria, flank pain, frequency, genital sores, hematuria, pelvic pain, urgency, vaginal  "bleeding, vaginal discharge and vaginal pain.   Skin:  Negative for rash.     Current Outpatient Medications on File Prior to Visit   Medication Sig Dispense Refill    amLODIPine (NORVASC) 5 mg tablet Take 1 tablet (5 mg total) by mouth 2 (two) times a day 180 tablet 3    Ascorbic Acid (VITAMIN C) 500 MG CAPS Take 1 capsule by mouth daily      Calcium Carbonate-Vitamin D3 600-400 MG-UNIT TABS Take by mouth in the morning      Cholecalciferol (VITAMIN D) 2000 units tablet Take by mouth daily      famotidine (PEPCID) 20 mg tablet Take 1 tablet (20 mg total) by mouth every morning 90 tablet 3    folic acid (FOLVITE) 800 MCG tablet Take 1 tablet by mouth daily      hydroCHLOROthiazide 25 mg tablet Take 1 tablet (25 mg total) by mouth daily 90 tablet 3    omeprazole (PriLOSEC) 20 mg delayed release capsule TAKE 1 CAPSULE BY MOUTH EVERY DAY 90 capsule 1    potassium chloride (Klor-Con M10) 10 mEq tablet Take 1 tablet (10 mEq total) by mouth daily 90 tablet 3    prednisoLONE acetate (PRED FORTE) 1 % ophthalmic suspension       simvastatin (ZOCOR) 20 mg tablet Take 1 tablet (20 mg total) by mouth daily 90 tablet 1    valsartan (DIOVAN) 160 mg tablet Take 1 tablet (160 mg total) by mouth daily 90 tablet 3     No current facility-administered medications on file prior to visit.      Objective     /78   Ht 5' 4\" (1.626 m)   Wt 87.9 kg (193 lb 12.8 oz)   BMI 33.27 kg/m²     Physical Exam  Constitutional:       Appearance: Normal appearance. She is well-developed.   HENT:      Head: Normocephalic and atraumatic.   Cardiovascular:      Rate and Rhythm: Normal rate and regular rhythm.   Pulmonary:      Effort: Pulmonary effort is normal.      Breath sounds: Normal breath sounds.   Chest:   Breasts:     Right: No inverted nipple, mass, nipple discharge, skin change or tenderness.      Left: No inverted nipple, mass, nipple discharge, skin change or tenderness.   Abdominal:      General: Bowel sounds are normal. There is no " distension.      Palpations: Abdomen is soft.      Tenderness: There is no abdominal tenderness. There is no guarding or rebound.   Genitourinary:     Labia:         Right: No rash, tenderness or lesion.         Left: No rash, tenderness or lesion.       Vagina: Normal. No signs of injury. No vaginal discharge or tenderness.      Cervix: No cervical motion tenderness, discharge, friability, lesion or cervical bleeding.      Uterus: Not enlarged and not tender.       Adnexa:         Right: No mass, tenderness or fullness.          Left: No mass, tenderness or fullness.     Neurological:      Mental Status: She is alert.   Psychiatric:         Behavior: Behavior normal.       Administrative Statements   I have spent a total time of 30 minutes in caring for this patient on the day of the visit/encounter including Impressions, Counseling / Coordination of care, Documenting in the medical record, Reviewing / ordering tests, medicine, procedures  , and Obtaining or reviewing history  .

## 2024-07-16 ENCOUNTER — HOSPITAL ENCOUNTER (OUTPATIENT)
Dept: ULTRASOUND IMAGING | Facility: HOSPITAL | Age: 84
Discharge: HOME | End: 2024-07-16
Payer: MEDICARE

## 2024-07-16 DIAGNOSIS — N83.201 CYST OF RIGHT OVARY: ICD-10-CM

## 2024-07-16 PROCEDURE — 76856 US EXAM PELVIC COMPLETE: CPT

## 2024-07-16 PROCEDURE — 76830 TRANSVAGINAL US NON-OB: CPT

## 2024-07-19 ENCOUNTER — NURSE TRIAGE (OUTPATIENT)
Age: 84
End: 2024-07-19

## 2024-07-19 NOTE — TELEPHONE ENCOUNTER
"Significant findings on pts pelvic US ordered by Dr. Mccullough. ESC sent to Dr. Burns (on call).     Dr. Burns response- can be followed up by Dr. Mccullough on her return. should make followup appt with Dr. Shultz office to discuss further.     Will await further recommendation from Dr. Mccullough.  Reason for Disposition   Nursing judgment    Answer Assessment - Initial Assessment Questions  1. REASON FOR CALL or QUESTION: \"What is your reason for calling today?\" or \"How can I best help you?\" or \"What question do you have that I can help answer?\"      Significant findings    Protocols used: Information Only Call - No Triage-ADULT-[place]    "

## 2024-07-19 NOTE — TELEPHONE ENCOUNTER
----- Message from Kaila GARCIA sent at 7/19/2024  8:31 AM EDT -----  US pelvic significant findings

## 2024-07-23 DIAGNOSIS — N83.201 CYST OF RIGHT OVARY: Primary | ICD-10-CM

## 2024-08-08 ENCOUNTER — OFFICE VISIT (OUTPATIENT)
Dept: CARDIOLOGY CLINIC | Facility: CLINIC | Age: 84
End: 2024-08-08
Payer: MEDICARE

## 2024-08-08 VITALS
SYSTOLIC BLOOD PRESSURE: 150 MMHG | HEIGHT: 64 IN | WEIGHT: 191 LBS | HEART RATE: 66 BPM | DIASTOLIC BLOOD PRESSURE: 72 MMHG | BODY MASS INDEX: 32.61 KG/M2

## 2024-08-08 DIAGNOSIS — R42 VERTIGO: ICD-10-CM

## 2024-08-08 DIAGNOSIS — I10 ESSENTIAL HYPERTENSION: ICD-10-CM

## 2024-08-08 DIAGNOSIS — Z86.718 HISTORY OF DVT OF LOWER EXTREMITY: ICD-10-CM

## 2024-08-08 DIAGNOSIS — R00.1 BRADYCARDIA: Primary | ICD-10-CM

## 2024-08-08 PROCEDURE — 99214 OFFICE O/P EST MOD 30 MIN: CPT | Performed by: INTERNAL MEDICINE

## 2024-08-08 PROCEDURE — 93000 ELECTROCARDIOGRAM COMPLETE: CPT | Performed by: INTERNAL MEDICINE

## 2024-08-08 NOTE — PROGRESS NOTES
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   Last echo 5/10/23 with EF 55% mild regurg of the TV and MV valves and ,mildly dilated LA   Seen by EP    HR have been in the 50's but patient remained largely asx and was able to exercise without complaints   PPM not warranted during that time   [place] TSH was normal but has not had blood work done since    EKG here shows sinus rhythm with PVC and rates 66 bpm   Hx of DVT   provoked by drive to [place] in 2018   Completed 6 months of eliquis   Currently not on anticoagulation   Essential Hypertension   On amlodipine 5 mg, Valsartan 160 mg daily, and HCTZ 25mg daily    BP in office today 150/72  Regularly monitors BP at home and appear well controlled  4. BPPV         A. dx'ed during hospital stay earlier this month         B. On meclizine prn but has not needed to use.       Discussion/Plan:    Patient reports feeling well, is able to exert herself at home without difficulty, denies chest discomfort, SOB, or leg swelling     She did report some left arm pain around one month ago. Hx suggestive of MSK pain; lower suspicion of cardiac cause.    BP elevated in office today at 150/72. Patient reports stressful drive to office today. Patient's BP appear well controlled per home BP readings    Continue amolidipine, valsartan, and HCTZ at current dosing     Continue monitoring home BP      Patient has been instructed to follow up in our EP office in 1 year or as needed. She will call our office with any questions or concerns in the meantime.    Rhythm History:   Atrial fibrillation:      Atrial flutter:      SVT:      VT/VF/PVC:     Device history:   Pacemaker:     Defibrillator:     BIV PPM:     BIV ICD:     ILR:    Interim  "History/HPI:   Interim history: Bharati Rico is a 83 y.o. female with a PMH of Bradycardia, HTN, DVT hx, and BPPV.    She presents today for routine outpatient follow up given her history of bradycardia. Since her last visit she reports she has been feeling well, is able to exert herself without difficulty, and denies chest pain, palpitations, SOB, or leg swelling. She did endorse some left arm pain which happened while trying to sleep around one month ago. She states this was intermittent and has not happened since. She has been able to exert herself and denies chest pain since.     EKG: sinus rhythm with PVC and rates 66 bpm     Review of Systems   Constitutional:  Negative for appetite change, chills, fatigue, fever and unexpected weight change.   Respiratory:  Negative for chest tightness and shortness of breath.    Cardiovascular:  Negative for chest pain, palpitations and leg swelling.   Neurological:  Negative for dizziness, syncope, weakness and light-headedness.         OBJECTIVE:   Vitals:   /72 (BP Location: Left arm, Patient Position: Sitting, Cuff Size: Large)   Pulse 66   Ht 5' 4\" (1.626 m)   Wt 86.6 kg (191 lb)   BMI 32.79 kg/m²   Body mass index is 32.79 kg/m².        Physical Exam:   Physical Exam  Constitutional:       General: She is not in acute distress.     Appearance: Normal appearance. She is not ill-appearing.   HENT:      Head: Normocephalic and atraumatic.      Nose: Nose normal.   Eyes:      General:         Right eye: No discharge.         Left eye: No discharge.   Neck:      Vascular: No carotid bruit.   Cardiovascular:      Rate and Rhythm: Normal rate and regular rhythm.      Pulses: Normal pulses.      Heart sounds: Normal heart sounds.   Pulmonary:      Effort: Pulmonary effort is normal.      Breath sounds: Normal breath sounds.   Musculoskeletal:      Right lower leg: No edema.      Left lower leg: No edema.   Skin:     General: Skin is warm and dry.      Capillary " Refill: Capillary refill takes less than 2 seconds.   Neurological:      Mental Status: She is alert.            Medications:      Current Outpatient Medications:     amLODIPine (NORVASC) 5 mg tablet, Take 1 tablet (5 mg total) by mouth 2 (two) times a day, Disp: 180 tablet, Rfl: 3    Ascorbic Acid (VITAMIN C) 500 MG CAPS, Take 1 capsule by mouth daily, Disp: , Rfl:     Calcium Carbonate-Vitamin D3 600-400 MG-UNIT TABS, Take by mouth in the morning, Disp: , Rfl:     famotidine (PEPCID) 20 mg tablet, Take 1 tablet (20 mg total) by mouth every morning, Disp: 90 tablet, Rfl: 3    folic acid (FOLVITE) 800 MCG tablet, Take 1 tablet by mouth daily, Disp: , Rfl:     hydroCHLOROthiazide 25 mg tablet, Take 1 tablet (25 mg total) by mouth daily, Disp: 90 tablet, Rfl: 3    omeprazole (PriLOSEC) 20 mg delayed release capsule, TAKE 1 CAPSULE BY MOUTH EVERY DAY, Disp: 90 capsule, Rfl: 1    potassium chloride (Klor-Con M10) 10 mEq tablet, Take 1 tablet (10 mEq total) by mouth daily, Disp: 90 tablet, Rfl: 3    prednisoLONE acetate (PRED FORTE) 1 % ophthalmic suspension, , Disp: , Rfl:     simvastatin (ZOCOR) 20 mg tablet, Take 1 tablet (20 mg total) by mouth daily, Disp: 90 tablet, Rfl: 1    valsartan (DIOVAN) 160 mg tablet, Take 1 tablet (160 mg total) by mouth daily, Disp: 90 tablet, Rfl: 3    Cholecalciferol (VITAMIN D) 2000 units tablet, Take by mouth daily (Patient not taking: Reported on 8/8/2024), Disp: , Rfl:        Historical Information   Past Medical History:   Diagnosis Date    Acid reflux     Allergic unknown    Sulfa, Motrin    BPPV (benign paroxysmal positional vertigo)     DVT (deep venous thrombosis) ([facility]) 01/2018    GERD (gastroesophageal reflux disease) unknown    Hearing loss     HL (hearing loss) unknown    ear problems since 1 1/2 years old.  Aids in 1966.    Hypercholesterolemia     Hypertension     Impingement syndrome of left shoulder 07/12/2019    Shingles 2009    had vaccine in 2010; shringrix given 2019  and 2020    Wears glasses        Past Surgical History:   Procedure Laterality Date    BREAST BIOPSY Left     1995 left, 1997 unknown side    COLONOSCOPY      TONSILLECTOMY      TUBAL LIGATION      UPPER GASTROINTESTINAL ENDOSCOPY      WISDOM TOOTH EXTRACTION         Social History     Substance and Sexual Activity   Alcohol Use Not Currently    Alcohol/week: 1.0 standard drink of alcohol    Types: 1 Glasses of wine per week    Comment: Not weekly, maybe monthly, if that,  one glass of wine     Social History     Substance and Sexual Activity   Drug Use No     Social History     Tobacco Use   Smoking Status Never    Passive exposure: Never   Smokeless Tobacco Never       Family History   Problem Relation Age of Onset    Diabetes Mother     Hypertension Mother     Hearing loss Mother              Cancer Father         Colon    Diabetes Father     Hypertension Father     Colon cancer Father 65    Congenital heart disease Daughter     Hyperlipidemia Other         pure    Lymphoma Sister 65    Lung cancer Sister 57    Hypertension Sister     Diabetes Sister     Cancer Sister         Lung and non-Hodgkin    Skin cancer Brother 65        Melanoma    No Known Problems Maternal Grandmother     No Known Problems Maternal Grandfather     No Known Problems Paternal Grandmother     No Known Problems Paternal Grandfather     Prostate cancer Brother 65    No Known Problems Daughter     Breast cancer Other 55    Hypertension Brother     Cancer Brother         Prostate    Hypertension Brother     Cancer Brother         Skin    Glaucoma Brother          Labs & Results:  Below is the patient's most recent value for Albumin, ALT, AST, BUN, Calcium, Chloride, Cholesterol, CO2, Creatinine, GFR, Glucose, HDL, Hematocrit, Hemoglobin, Hemoglobin A1C, LDL, Magnesium, Phosphorus, Platelets, Potassium, PSA, Sodium, Triglycerides, and WBC.   Lab Results   Component Value Date    ALT 2024    AST 2024    BUN 18  07/09/2024    CALCIUM 9.2 07/09/2024     07/09/2024    CO2 25 07/09/2024    CREATININE 0.76 07/09/2024    HDL 63 07/09/2024    HCT 44.9 07/09/2024    HGB 14.3 07/09/2024    HGBA1C 5.9 (H) 07/09/2024     07/09/2024    K 4.6 07/09/2024    TRIG 102 07/09/2024    WBC 5.52 07/09/2024     Note: for a comprehensive list of the patient's lab results, access the Results Review activity.

## 2024-08-20 ENCOUNTER — RA CDI HCC (OUTPATIENT)
Dept: OTHER | Facility: HOSPITAL | Age: 84
End: 2024-08-20

## 2024-08-27 ENCOUNTER — TELEPHONE (OUTPATIENT)
Dept: ADMINISTRATIVE | Facility: OTHER | Age: 84
End: 2024-08-27

## 2024-08-27 ENCOUNTER — OFFICE VISIT (OUTPATIENT)
Dept: FAMILY MEDICINE CLINIC | Facility: CLINIC | Age: 84
End: 2024-08-27
Payer: MEDICARE

## 2024-08-27 VITALS
DIASTOLIC BLOOD PRESSURE: 80 MMHG | BODY MASS INDEX: 33.12 KG/M2 | HEIGHT: 64 IN | TEMPERATURE: 97.7 F | WEIGHT: 194 LBS | OXYGEN SATURATION: 98 % | SYSTOLIC BLOOD PRESSURE: 134 MMHG | RESPIRATION RATE: 20 BRPM | HEART RATE: 65 BPM

## 2024-08-27 DIAGNOSIS — I10 ESSENTIAL HYPERTENSION: Primary | ICD-10-CM

## 2024-08-27 DIAGNOSIS — Z00.00 MEDICARE ANNUAL WELLNESS VISIT, SUBSEQUENT: ICD-10-CM

## 2024-08-27 DIAGNOSIS — K21.9 GASTROESOPHAGEAL REFLUX DISEASE WITHOUT ESOPHAGITIS: ICD-10-CM

## 2024-08-27 DIAGNOSIS — R73.03 PREDIABETES: ICD-10-CM

## 2024-08-27 DIAGNOSIS — E78.00 PURE HYPERCHOLESTEROLEMIA: ICD-10-CM

## 2024-08-27 PROCEDURE — 99214 OFFICE O/P EST MOD 30 MIN: CPT | Performed by: FAMILY MEDICINE

## 2024-08-27 PROCEDURE — G0439 PPPS, SUBSEQ VISIT: HCPCS | Performed by: FAMILY MEDICINE

## 2024-08-27 RX ORDER — SIMVASTATIN 20 MG
20 TABLET ORAL DAILY
Qty: 90 TABLET | Refills: 3 | Status: SHIPPED | OUTPATIENT
Start: 2024-08-27

## 2024-08-27 NOTE — TELEPHONE ENCOUNTER
----- Message from Jonathon Pope MD sent at 8/27/2024 10:31 AM EDT -----  Regarding: Osteoporosis screening  08/27/24 10:31 AM    Hello, our patient Bharati Rico has had DEXA Scan completed/performed. Please assist in updating the patient chart by pulling the Care Everywhere (CE) document. The date of service is 09/27/2017.     Thank you,  MD CHAYO Brumfield

## 2024-08-27 NOTE — PROGRESS NOTES
Ambulatory Visit  Name: Bharati Rico      : 1940      MRN: [id number]  Encounter Provider: Jonathon Pope MD  Encounter Date: 2024   Encounter department: [facility]    Assessment & Plan   1. Essential hypertension  -     CBC and differential  -     Comprehensive metabolic panel  2. Pure hypercholesterolemia  -     simvastatin (ZOCOR) 20 mg tablet; Take 1 tablet (20 mg total) by mouth daily  -     Lipid panel  3. Prediabetes  -     Hemoglobin A1C  4. Gastroesophageal reflux disease without esophagitis  5. Medicare annual wellness visit, subsequent    Continue with current medications.  Diet and weight loss.  Office visit next year with repeat labs.    Flu vaccine in the fall    Repeat pelvic ultrasound 10/2024.      Depression Screening and Follow-up Plan: Patient was screened for depression during today's encounter. They screened negative with a PHQ-2 score of 0.      Preventive health issues were discussed with patient, and age appropriate screening tests were ordered as noted in patient's After Visit Summary. Personalized health advice and appropriate referrals for health education or preventive services given if needed, as noted in patient's After Visit Summary.    History of Present Illness       Follow up visit for chronic medical problems/AWV   Medications reviewed.  10/2023 mammogram     Hypertension blood pressures have been stable on Amlodipine 5 mg BID, Valsartan 160 mg daily and HCTZ 25 mg daily. 2024 creatinine 0.76. GFR 73. Electrolytes normal  K+ 4.6. on K+ supplement. Hgb 14.3      Hyperlipidemia on Simvastatin 20 mg/day- 2024  lipid profile cholesterol 178. Triglycerides 102. HDL 63. LDL 95. LFTs normal.      Prediabetes 2024 FBS 92 decreased from 103. 2023 A1c 5.9 .        Recent Results (from the past 1344 hour(s))   Comprehensive metabolic panel    Collection Time: 24  9:53 AM   Result Value Ref Range    Sodium 139 135 - 147 mmol/L    Potassium  4.6 3.5 - 5.3 mmol/L    Chloride 104 96 - 108 mmol/L    CO2 25 21 - 32 mmol/L    ANION GAP 10 4 - 13 mmol/L    BUN 18 5 - 25 mg/dL    Creatinine 0.76 0.60 - 1.30 mg/dL    Glucose, Fasting 92 65 - 99 mg/dL    Calcium 9.2 8.4 - 10.2 mg/dL    AST 23 13 - 39 U/L    ALT 12 7 - 52 U/L    Alkaline Phosphatase 67 34 - 104 U/L    Total Protein 6.6 6.4 - 8.4 g/dL    Albumin 3.8 3.5 - 5.0 g/dL    Total Bilirubin 0.63 0.20 - 1.00 mg/dL    eGFR 73 ml/min/1.73sq m   CBC and differential    Collection Time: 07/09/24  9:53 AM   Result Value Ref Range    WBC 5.52 4.31 - 10.16 Thousand/uL    RBC 5.15 (H) 3.81 - 5.12 Million/uL    Hemoglobin 14.3 11.5 - 15.4 g/dL    Hematocrit 44.9 34.8 - 46.1 %    MCV 87 82 - 98 fL    MCH 27.8 26.8 - 34.3 pg    MCHC 31.8 31.4 - 37.4 g/dL    RDW 13.6 11.6 - 15.1 %    MPV 11.8 8.9 - 12.7 fL    Platelets 203 149 - 390 Thousands/uL    nRBC 0 /100 WBCs    Segmented % 55 43 - 75 %    Immature Grans % 0 0 - 2 %    Lymphocytes % 30 14 - 44 %    Monocytes % 10 4 - 12 %    Eosinophils Relative 4 0 - 6 %    Basophils Relative 1 0 - 1 %    Absolute Neutrophils 3.10 1.85 - 7.62 Thousands/µL    Absolute Immature Grans 0.01 0.00 - 0.20 Thousand/uL    Absolute Lymphocytes 1.63 0.60 - 4.47 Thousands/µL    Absolute Monocytes 0.54 0.17 - 1.22 Thousand/µL    Eosinophils Absolute 0.20 0.00 - 0.61 Thousand/µL    Basophils Absolute 0.04 0.00 - 0.10 Thousands/µL   Lipid panel    Collection Time: 07/09/24  9:53 AM   Result Value Ref Range    Cholesterol 178 See Comment mg/dL    Triglycerides 102 See Comment mg/dL    HDL, Direct 63 >=50 mg/dL    LDL Calculated 95 0 - 100 mg/dL    Non-HDL-Chol (CHOL-HDL) 115 mg/dl   Hemoglobin A1C    Collection Time: 07/09/24  9:53 AM   Result Value Ref Range    Hemoglobin A1C 5.9 (H) Normal 4.0-5.6%; PreDiabetic 5.7-6.4%; Diabetic >=6.5%; Glycemic control for adults with diabetes <7.0% %     mg/dl       Current Outpatient Medications:     amLODIPine (NORVASC) 5 mg tablet, Take 1 tablet  (5 mg total) by mouth 2 (two) times a day, Disp: 180 tablet, Rfl: 3    Ascorbic Acid (VITAMIN C) 500 MG CAPS, Take 1 capsule by mouth daily, Disp: , Rfl:     Calcium Carbonate-Vitamin D3 600-400 MG-UNIT TABS, Take by mouth in the morning, Disp: , Rfl:     famotidine (PEPCID) 20 mg tablet, Take 1 tablet (20 mg total) by mouth every morning, Disp: 90 tablet, Rfl: 3    folic acid (FOLVITE) 800 MCG tablet, Take 1 tablet by mouth daily, Disp: , Rfl:     hydroCHLOROthiazide 25 mg tablet, Take 1 tablet (25 mg total) by mouth daily, Disp: 90 tablet, Rfl: 3    omeprazole (PriLOSEC) 20 mg delayed release capsule, TAKE 1 CAPSULE BY MOUTH EVERY DAY, Disp: 90 capsule, Rfl: 1    potassium chloride (Klor-Con M10) 10 mEq tablet, Take 1 tablet (10 mEq total) by mouth daily, Disp: 90 tablet, Rfl: 3    prednisoLONE acetate (PRED FORTE) 1 % ophthalmic suspension, , Disp: , Rfl:     simvastatin (ZOCOR) 20 mg tablet, Take 1 tablet (20 mg total) by mouth daily, Disp: 90 tablet, Rfl: 3    valsartan (DIOVAN) 160 mg tablet, Take 1 tablet (160 mg total) by mouth daily, Disp: 90 tablet, Rfl: 3    Cholecalciferol (VITAMIN D) 2000 units tablet, Take by mouth daily (Patient not taking: Reported on 8/8/2024), Disp: , Rfl:      Allergies   Allergen Reactions    Sulfa Antibiotics Other (See Comments)     Unknown reaction     Motrin [Ibuprofen] GI Intolerance     If on prescription strength, over the counter she does fine with     Social History     Socioeconomic History    Marital status: /Civil Union     Spouse name: Not on file    Number of children: Not on file    Years of education: Not on file    Highest education level: Not on file   Occupational History    Not on file   Tobacco Use    Smoking status: Never     Passive exposure: Never    Smokeless tobacco: Never   Vaping Use    Vaping status: Never Used   Substance and Sexual Activity    Alcohol use: Yes     Alcohol/week: 1.0 standard drink of alcohol     Types: 1 Glasses of wine per  week     Comment: Not weekly, maybe monthly, if that,  one glass of wine    Drug use: No    Sexual activity: Yes     Partners: Male     Birth control/protection: Post-menopausal, Female Sterilization   Other Topics Concern    Not on file   Social History Narrative    Not on file     Social Determinants of Health     Financial Resource Strain: Low Risk  (2023)    Overall Financial Resource Strain (CARDIA)     Difficulty of Paying Living Expenses: Not hard at all   Food Insecurity: No Food Insecurity (2024)    Hunger Vital Sign     Worried About Running Out of Food in the Last Year: Never true     Ran Out of Food in the Last Year: Never true   Transportation Needs: No Transportation Needs (2024)    PRAPARE - Transportation     Lack of Transportation (Medical): No     Lack of Transportation (Non-Medical): No   Physical Activity: Not on file   Stress: Not on file   Social Connections: Not on file   Intimate Partner Violence: Not on file   Housing Stability: Low Risk  (2024)    Housing Stability Vital Sign     Unable to Pay for Housing in the Last Year: No     Number of Times Moved in the Last Year: 0     Homeless in the Last Year: No     Family History   Problem Relation Age of Onset    Diabetes Mother     Hypertension Mother     Hearing loss Mother              Cancer Father         Colon    Diabetes Father     Hypertension Father     Colon cancer Father 65    Congenital heart disease Daughter     Hyperlipidemia Other         pure    Lymphoma Sister 65    Lung cancer Sister 57    Hypertension Sister     Diabetes Sister     Cancer Sister         Lung and non-Hodgkin    Skin cancer Brother 65        Melanoma    No Known Problems Maternal Grandmother     No Known Problems Maternal Grandfather     No Known Problems Paternal Grandmother     No Known Problems Paternal Grandfather     Prostate cancer Brother 65    No Known Problems Daughter     Breast cancer Other 55    Hypertension Brother      Cancer Brother         Prostate    Hypertension Brother     Cancer Brother         Skin    Glaucoma Brother             Patient Care Team:  Jonathon Pope MD as PCP - General (Family Medicine)  MARIELOS Curiel DO          Review of Systems   Constitutional:  Negative for appetite change, chills, fatigue, fever and unexpected weight change.   HENT:  Positive for hearing loss (bilateral hearing aids. ). Negative for congestion, ear pain, postnasal drip, rhinorrhea, sore throat and trouble swallowing.         Prior ENT evaluation for LPR   Eyes:  Negative for visual disturbance.   Respiratory:  Negative for cough, shortness of breath and wheezing.    Cardiovascular:  Negative for chest pain, palpitations and leg swelling.        5/2023 echocardiogram normal left ventricular systolic function EF 55%.  Left ventricular cavity size normal.  Wall thickness normal.  Wall motion normal.  Mildly dilated left atrium.  Mild MR.  Mild TR.      Past history of DVT left leg treated with Eliquis. No recurrence. No FH venous thrombosis.    Gastrointestinal:  Negative for abdominal pain, blood in stool, constipation, diarrhea, nausea and vomiting.        GERD stable on Famotidine 20 mg daily. She uses  prn  Omeprazole 20 mg  No dysphagia. No history of Mauro's. 04/2021 EGD Normal except for hiatal hernia. 04/2021 colonoscopy normal except for mild diverticulosis sigmoid colon   Endocrine:        2017 DEXA scan normal            Component                Value               Date                       [id number]             1.550               03/18/2021               Genitourinary:  Negative for difficulty urinating.        7/2024 for pelvic ultrasound slight interval enlargement of right ovarian 1.3 cm unilocular cystic lesion.  Endometrial stripe 6 mm.  Mild interval enlargement of anterior fundal fibroid.   Musculoskeletal:  Negative for arthralgias and myalgias.   Skin:  Negative for rash.   Neurological:   Negative for dizziness and headaches.   Hematological:  Negative for adenopathy. Does not bruise/bleed easily.   Psychiatric/Behavioral:  Negative for dysphoric mood and sleep disturbance.      Medical History Reviewed by provider this encounter:       Annual Wellness Visit Questionnaire   Bharati is here for her Subsequent Wellness visit. Last Medicare Wellness visit information reviewed, patient interviewed and updates made to the record today.      Health Risk Assessment:   Patient rates overall health as excellent. Patient feels that their physical health rating is same. Patient is very satisfied with their life. Eyesight was rated as same. Hearing was rated as same. Patient feels that their emotional and mental health rating is same. Patients states they are never, rarely angry. Patient states they are never, rarely unusually tired/fatigued. Pain experienced in the last 7 days has been none. Patient states that she has experienced no weight loss or gain in last 6 months.     Depression Screening:   PHQ-2 Score: 0      Fall Risk Screening:   In the past year, patient has experienced: no history of falling in past year      Urinary Incontinence Screening:   Patient has not leaked urine accidently in the last six months.     Home Safety:  Patient does not have trouble with stairs inside or outside of their home. Patient has working smoke alarms and has working carbon monoxide detector. Home safety hazards include: none.     Nutrition:   Current diet is Regular and No Added Salt.     Medications:   Patient is currently taking over-the-counter supplements. OTC medications include: see medication list. Patient is able to manage medications.     Activities of Daily Living (ADLs)/Instrumental Activities of Daily Living (IADLs):   Walk and transfer into and out of bed and chair?: Yes  Dress and groom yourself?: Yes    Bathe or shower yourself?: Yes    Feed yourself? Yes  Do your laundry/housekeeping?: Yes  Manage your  money, pay your bills and track your expenses?: Yes  Make your own meals?: Yes    Do your own shopping?: Yes    Durable Medical Equipment Suppliers  Dna    Previous Hospitalizations:   Any hospitalizations or ED visits within the last 12 months?: No      Advance Care Planning:   Living will: Yes    Durable POA for healthcare: Yes    Advanced directive: Yes      Cognitive Screening:   Provider or family/friend/caregiver concerned regarding cognition?: No    PREVENTIVE SCREENINGS      Cardiovascular Screening:    General: History Lipid Disorder and Screening Current      Diabetes Screening:     General: Screening Current      Colorectal Cancer Screening:     General: Screening Current      Breast Cancer Screening:     General: Screening Current      Cervical Cancer Screening:    General: Screening Current      Osteoporosis Screening:    General: Patient Declines      Abdominal Aortic Aneurysm (AAA) Screening:        General: Screening Not Indicated      Lung Cancer Screening:     General: Screening Not Indicated      Hepatitis C Screening:    General: Screening Not Indicated    Screening, Brief Intervention, and Referral to Treatment (SBIRT)    Screening  Typical number of drinks in a day: 0  Typical number of drinks in a week: 0  Interpretation: Low risk drinking behavior.    AUDIT-C Screenin) How often did you have a drink containing alcohol in the past year? monthly or less  2) How many drinks did you have on a typical day when you were drinking in the past year? 1 to 2  3) How often did you have 6 or more drinks on one occasion in the past year? never    AUDIT-C Score: 1  Interpretation: Score 0-2 (female): Negative screen for alcohol misuse    Single Item Drug Screening:  How often have you used an illegal drug (including marijuana) or a prescription medication for non-medical reasons in the past year? never    Single Item Drug Screen Score: 0  Interpretation: Negative screen for possible drug use  "disorder    Brief Intervention  Alcohol & drug use screenings were reviewed. No concerns regarding substance use disorder identified.     Other Counseling Topics:   Calcium and vitamin D intake and regular weightbearing exercise.     Social Determinants of Health     Financial Resource Strain: Low Risk  (8/17/2023)    Overall Financial Resource Strain (CARDIA)     Difficulty of Paying Living Expenses: Not hard at all   Food Insecurity: No Food Insecurity (8/20/2024)    Hunger Vital Sign     Worried About Running Out of Food in the Last Year: Never true     Ran Out of Food in the Last Year: Never true   Transportation Needs: No Transportation Needs (8/20/2024)    PRAPARE - Transportation     Lack of Transportation (Medical): No     Lack of Transportation (Non-Medical): No   Housing Stability: Low Risk  (8/20/2024)    Housing Stability Vital Sign     Unable to Pay for Housing in the Last Year: No     Number of Times Moved in the Last Year: 0     Homeless in the Last Year: No   Utilities: Not At Risk (8/20/2024)    [facility] Utilities     Threatened with loss of utilities: No     No results found.    Objective     /80 (BP Location: Left arm, Patient Position: Sitting, Cuff Size: Large)   Pulse 65   Temp 97.7 °F (36.5 °C)   Resp 20   Ht 5' 4\" (1.626 m)   Wt 88 kg (194 lb)   SpO2 98%   BMI 33.30 kg/m²     BP Readings from Last 3 Encounters:   08/27/24 134/80   08/08/24 150/72   07/10/24 148/78     Wt Readings from Last 3 Encounters:   08/27/24 88 kg (194 lb)   08/08/24 86.6 kg (191 lb)   07/10/24 87.5 kg (193 lb)       Physical Exam  Constitutional:       General: She is not in acute distress.  Eyes:      General: No scleral icterus.     Extraocular Movements: Extraocular movements intact.      Conjunctiva/sclera: Conjunctivae normal.      Pupils: Pupils are equal, round, and reactive to light.   Neck:      Thyroid: No thyroid mass or thyromegaly.      Vascular: Normal carotid pulses. No carotid bruit. "   Cardiovascular:      Rate and Rhythm: Normal rate and regular rhythm.      Heart sounds: No murmur heard.     No gallop.   Pulmonary:      Effort: Pulmonary effort is normal.      Breath sounds: Normal breath sounds. No wheezing or rales.   Musculoskeletal:      Right lower leg: No edema.      Left lower leg: No edema.   Lymphadenopathy:      Cervical: No cervical adenopathy.   Skin:     Findings: No rash.   Neurological:      General: No focal deficit present.      Mental Status: She is alert and oriented to person, place, and time.   Psychiatric:         Mood and Affect: Mood normal.         Cognition and Memory: Cognition normal.

## 2024-08-27 NOTE — PATIENT INSTRUCTIONS
Medicare Preventive Visit Patient Instructions  Thank you for completing your Welcome to Medicare Visit or Medicare Annual Wellness Visit today. Your next wellness visit will be due in one year (8/28/2025).  The screening/preventive services that you may require over the next 5-10 years are detailed below. Some tests may not apply to you based off risk factors and/or age. Screening tests ordered at today's visit but not completed yet may show as past due. Also, please note that scanned in results may not display below.  Preventive Screenings:  Service Recommendations Previous Testing/Comments   Colorectal Cancer Screening  * Colonoscopy    * Fecal Occult Blood Test (FOBT)/Fecal Immunochemical Test (FIT)  * Fecal DNA/Cologuard Test  * Flexible Sigmoidoscopy Age: 45-75 years old   Colonoscopy: every 10 years (may be performed more frequently if at higher risk)  OR  FOBT/FIT: every 1 year  OR  Cologuard: every 3 years  OR  Sigmoidoscopy: every 5 years  Screening may be recommended earlier than age 45 if at higher risk for colorectal cancer. Also, an individualized decision between you and your healthcare provider will decide whether screening between the ages of 76-85 would be appropriate. Colonoscopy: 04/27/2021  FOBT/FIT: Not on file  Cologuard: Not on file  Sigmoidoscopy: Not on file          Breast Cancer Screening Age: 40+ years old  Frequency: every 1-2 years  Not required if history of left and right mastectomy Mammogram: 10/19/2023        Cervical Cancer Screening Between the ages of 21-29, pap smear recommended once every 3 years.   Between the ages of 30-65, can perform pap smear with HPV co-testing every 5 years.   Recommendations may differ for women with a history of total hysterectomy, cervical cancer, or abnormal pap smears in past. Pap Smear: 07/10/2024        Hepatitis C Screening Once for adults born between 1945 and 1965  More frequently in patients at high risk for Hepatitis C Hep C Antibody: Not on  file        Diabetes Screening 1-2 times per year if you're at risk for diabetes or have pre-diabetes Fasting glucose: 92 mg/dL (7/9/2024)  A1C: 5.9 % (7/9/2024)      Cholesterol Screening Once every 5 years if you don't have a lipid disorder. May order more often based on risk factors. Lipid panel: 07/09/2024          Other Preventive Screenings Covered by Medicare:  Abdominal Aortic Aneurysm (AAA) Screening: covered once if your at risk. You're considered to be at risk if you have a family history of AAA.  Lung Cancer Screening: covers low dose CT scan once per year if you meet all of the following conditions: (1) Age 55-77; (2) No signs or symptoms of lung cancer; (3) Current smoker or have quit smoking within the last 15 years; (4) You have a tobacco smoking history of at least 20 pack years (packs per day multiplied by number of years you smoked); (5) You get a written order from a healthcare provider.  Glaucoma Screening: covered annually if you're considered high risk: (1) You have diabetes OR (2) Family history of glaucoma OR (3)  aged 50 and older OR (4)  American aged 65 and older  Osteoporosis Screening: covered every 2 years if you meet one of the following conditions: (1) You're estrogen deficient and at risk for osteoporosis based off medical history and other findings; (2) Have a vertebral abnormality; (3) On glucocorticoid therapy for more than 3 months; (4) Have primary hyperparathyroidism; (5) On osteoporosis medications and need to assess response to drug therapy.   Last bone density test (DXA Scan): Not on file.  HIV Screening: covered annually if you're between the age of 15-65. Also covered annually if you are younger than 15 and older than 65 with risk factors for HIV infection. For pregnant patients, it is covered up to 3 times per pregnancy.    Immunizations:  Immunization Recommendations   Influenza Vaccine Annual influenza vaccination during flu season is  recommended for all persons aged >= 6 months who do not have contraindications   Pneumococcal Vaccine   * Pneumococcal conjugate vaccine = PCV13 (Prevnar 13), PCV15 (Vaxneuvance), PCV20 (Prevnar 20)  * Pneumococcal polysaccharide vaccine = PPSV23 (Pneumovax) Adults 19-63 yo with certain risk factors or if 65+ yo  If never received any pneumonia vaccine: recommend Prevnar 20 (PCV20)  Give PCV20 if previously received 1 dose of PCV13 or PPSV23   Hepatitis B Vaccine 3 dose series if at intermediate or high risk (ex: diabetes, end stage renal disease, liver disease)   Respiratory syncytial virus (RSV) Vaccine - COVERED BY MEDICARE PART D  * RSVPreF3 (Arexvy) CDC recommends that adults 60 years of age and older may receive a single dose of RSV vaccine using shared clinical decision-making (SCDM)   Tetanus (Td) Vaccine - COST NOT COVERED BY MEDICARE PART B Following completion of primary series, a booster dose should be given every 10 years to maintain immunity against tetanus. Td may also be given as tetanus wound prophylaxis.   Tdap Vaccine - COST NOT COVERED BY MEDICARE PART B Recommended at least once for all adults. For pregnant patients, recommended with each pregnancy.   Shingles Vaccine (Shingrix) - COST NOT COVERED BY MEDICARE PART B  2 shot series recommended in those 19 years and older who have or will have weakened immune systems or those 50 years and older     Health Maintenance Due:      Topic Date Due   • Colorectal Cancer Screening  04/27/2026     Immunizations Due:      Topic Date Due   • COVID-19 Vaccine (3 - 2023-24 season) 09/01/2023   • Influenza Vaccine (1) 09/01/2024     Advance Directives   What are advance directives?  Advance directives are legal documents that state your wishes and plans for medical care. These plans are made ahead of time in case you lose your ability to make decisions for yourself. Advance directives can apply to any medical decision, such as the treatments you want, and if  you want to donate organs.   What are the types of advance directives?  There are many types of advance directives, and each state has rules about how to use them. You may choose a combination of any of the following:  Living will:  This is a written record of the treatment you want. You can also choose which treatments you do not want, which to limit, and which to stop at a certain time. This includes surgery, medicine, IV fluid, and tube feedings.   Durable power of  for healthcare (DPAHC):  This is a written record that states who you want to make healthcare choices for you when you are unable to make them for yourself. This person, called a proxy, is usually a family member or a friend. You may choose more than 1 proxy.  Do not resuscitate (DNR) order:  A DNR order is used in case your heart stops beating or you stop breathing. It is a request not to have certain forms of treatment, such as CPR. A DNR order may be included in other types of advance directives.  Medical directive:  This covers the care that you want if you are in a coma, near death, or unable to make decisions for yourself. You can list the treatments you want for each condition. Treatment may include pain medicine, surgery, blood transfusions, dialysis, IV or tube feedings, and a ventilator (breathing machine).  Values history:  This document has questions about your views, beliefs, and how you feel and think about life. This information can help others choose the care that you would choose.  Why are advance directives important?  An advance directive helps you control your care. Although spoken wishes may be used, it is better to have your wishes written down. Spoken wishes can be misunderstood, or not followed. Treatments may be given even if you do not want them. An advance directive may make it easier for your family to make difficult choices about your care.   Weight Management   Why it is important to manage your weight:  Being  overweight increases your risk of health conditions such as heart disease, high blood pressure, type 2 diabetes, and certain types of cancer. It can also increase your risk for osteoarthritis, sleep apnea, and other respiratory problems. Aim for a slow, steady weight loss. Even a small amount of weight loss can lower your risk of health problems.  How to lose weight safely:  A safe and healthy way to lose weight is to eat fewer calories and get regular exercise. You can lose up about 1 pound a week by decreasing the number of calories you eat by 500 calories each day.   Healthy meal plan for weight management:  A healthy meal plan includes a variety of foods, contains fewer calories, and helps you stay healthy. A healthy meal plan includes the following:  Eat whole-grain foods more often.  A healthy meal plan should contain fiber. Fiber is the part of grains, fruits, and vegetables that is not broken down by your body. Whole-grain foods are healthy and provide extra fiber in your diet. Some examples of whole-grain foods are whole-wheat breads and pastas, oatmeal, brown rice, and bulgur.  Eat a variety of vegetables every day.  Include dark, leafy greens such as spinach, kale, logan greens, and mustard greens. Eat yellow and orange vegetables such as carrots, sweet potatoes, and winter squash.   Eat a variety of fruits every day.  Choose fresh or canned fruit (canned in its own juice or light syrup) instead of juice. Fruit juice has very little or no fiber.  Eat low-fat dairy foods.  Drink fat-free (skim) milk or 1% milk. Eat fat-free yogurt and low-fat cottage cheese. Try low-fat cheeses such as mozzarella and other reduced-fat cheeses.  Choose meat and other protein foods that are low in fat.  Choose beans or other legumes such as split peas or lentils. Choose fish, skinless poultry (chicken or turkey), or lean cuts of red meat (beef or pork). Before you cook meat or poultry, cut off any visible fat.   Use less  fat and oil.  Try baking foods instead of frying them. Add less fat, such as margarine, sour cream, regular salad dressing and mayonnaise to foods. Eat fewer high-fat foods. Some examples of high-fat foods include french fries, doughnuts, ice cream, and cakes.  Eat fewer sweets.  Limit foods and drinks that are high in sugar. This includes candy, cookies, regular soda, and sweetened drinks.  Exercise:  Exercise at least 30 minutes per day on most days of the week. Some examples of exercise include walking, biking, dancing, and swimming. You can also fit in more physical activity by taking the stairs instead of the elevator or parking farther away from stores. Ask your healthcare provider about the best exercise plan for you.      © Copyright [employer] 2018 Information is for End User's use only and may not be sold, redistributed or otherwise used for commercial purposes. All illustrations and images included in CareNotes® are the copyrighted property of [employer].A[employer]M., Inc[employer] or [employer]

## 2024-08-28 NOTE — TELEPHONE ENCOUNTER
Upon review of the In Basket request we were able to locate, review, and update the patient chart as requested for DEXA Scan.    Any additional questions or concerns should be emailed to the Practice Liaisons via the appropriate education email address, please do not reply via In Basket.    Thank you  Jenny Hendrix MA   [facility]

## 2024-10-04 ENCOUNTER — IMMUNIZATIONS (OUTPATIENT)
Dept: FAMILY MEDICINE CLINIC | Facility: CLINIC | Age: 84
End: 2024-10-04
Payer: MEDICARE

## 2024-10-04 DIAGNOSIS — Z23 ENCOUNTER FOR IMMUNIZATION: Primary | ICD-10-CM

## 2024-10-04 PROCEDURE — G0008 ADMIN INFLUENZA VIRUS VAC: HCPCS

## 2024-10-04 PROCEDURE — 90662 IIV NO PRSV INCREASED AG IM: CPT

## 2024-10-21 ENCOUNTER — HOSPITAL ENCOUNTER (OUTPATIENT)
Dept: ULTRASOUND IMAGING | Facility: HOSPITAL | Age: 84
Discharge: HOME | End: 2024-10-21
Payer: MEDICARE

## 2024-10-21 ENCOUNTER — HOSPITAL ENCOUNTER (OUTPATIENT)
Dept: MAMMOGRAPHY | Facility: HOSPITAL | Age: 84
Discharge: HOME | End: 2024-10-21
Attending: SURGERY
Payer: MEDICARE

## 2024-10-21 ENCOUNTER — APPOINTMENT (OUTPATIENT)
Dept: RADIOLOGY | Facility: HOSPITAL | Age: 84
End: 2024-10-21
Payer: MEDICARE

## 2024-10-21 VITALS — BODY MASS INDEX: 33.12 KG/M2 | WEIGHT: 194 LBS | HEIGHT: 64 IN

## 2024-10-21 DIAGNOSIS — Z12.31 SCREENING MAMMOGRAM, ENCOUNTER FOR: ICD-10-CM

## 2024-10-21 DIAGNOSIS — N83.201 CYST OF RIGHT OVARY: ICD-10-CM

## 2024-10-21 PROCEDURE — 77063 BREAST TOMOSYNTHESIS BI: CPT

## 2024-10-21 PROCEDURE — 77067 SCR MAMMO BI INCL CAD: CPT

## 2024-10-21 PROCEDURE — 76830 TRANSVAGINAL US NON-OB: CPT

## 2024-10-21 PROCEDURE — 76856 US EXAM PELVIC COMPLETE: CPT

## 2024-10-24 ENCOUNTER — HOSPITAL ENCOUNTER (OUTPATIENT)
Dept: ULTRASOUND IMAGING | Facility: CLINIC | Age: 84
End: 2024-10-24
Payer: MEDICARE

## 2024-10-24 DIAGNOSIS — R92.8 ABNORMAL MAMMOGRAM: ICD-10-CM

## 2024-10-24 PROCEDURE — 76642 ULTRASOUND BREAST LIMITED: CPT

## 2024-10-30 ENCOUNTER — OFFICE VISIT (OUTPATIENT)
Dept: OBGYN CLINIC | Facility: CLINIC | Age: 84
End: 2024-10-30
Payer: MEDICARE

## 2024-10-30 VITALS
WEIGHT: 198.2 LBS | SYSTOLIC BLOOD PRESSURE: 160 MMHG | HEIGHT: 64 IN | DIASTOLIC BLOOD PRESSURE: 76 MMHG | BODY MASS INDEX: 33.84 KG/M2

## 2024-10-30 DIAGNOSIS — N83.201 CYST OF RIGHT OVARY: Primary | ICD-10-CM

## 2024-10-30 PROCEDURE — 99213 OFFICE O/P EST LOW 20 MIN: CPT | Performed by: OBSTETRICS & GYNECOLOGY

## 2024-10-30 NOTE — PROGRESS NOTES
Ambulatory Visit  Name: Bharati Rico      : 1940      MRN: [id number]  Encounter Provider: Rossana Mccullough DO  Encounter Date: 10/30/2024   Encounter department: [facility]    Assessment & Plan  Cyst of right ovary       Reviewed most recent pelvic ultrasound revealing simple right ovarian small cyst 1.8 x 1.3 cm unchanged over the last 3 years.  Patient is asymptomatic.  No further imaging studies required.  She will return to office in 2 years for annual visit as well as pelvic exam.  She will continue to follow-up with her breast surgeon as scheduled.    History of Present Illness     Bharati Rico is a 83 y.o. female who presents     This is a pleasant 83-year-old female P3 ( x 3) presents for follow-up.  She has had persistent stable small right ovarian cysts.  She denies any pelvic pain.  She went through menopause at age 53.  She was on HRT for 2 years thereafter.  There is been no vaginal bleeding or spotting.  No changes in bowel or bladder function.  She has been in a monogamous relationship for over 54 years.  She continues to follow-up with her breast specialist on a regular basis.    History obtained from : patient  Review of Systems   Constitutional:  Negative for fatigue, fever and unexpected weight change.   Respiratory:  Negative for cough, chest tightness, shortness of breath and wheezing.    Cardiovascular: Negative.  Negative for chest pain and palpitations.   Gastrointestinal: Negative.  Negative for abdominal distention, abdominal pain, blood in stool, constipation, diarrhea, nausea and vomiting.   Genitourinary: Negative.  Negative for difficulty urinating, dyspareunia, dysuria, flank pain, frequency, genital sores, hematuria, pelvic pain, urgency, vaginal bleeding, vaginal discharge and vaginal pain.   Skin:  Negative for rash.     Current Outpatient Medications on File Prior to Visit   Medication Sig Dispense Refill    amLODIPine (NORVASC) 5 mg tablet Take 1  "tablet (5 mg total) by mouth 2 (two) times a day 180 tablet 3    Ascorbic Acid (VITAMIN C) 500 MG CAPS Take 1 capsule by mouth daily      Calcium Carbonate-Vitamin D3 600-400 MG-UNIT TABS Take by mouth in the morning      Cholecalciferol (VITAMIN D) 2000 units tablet Take by mouth daily      famotidine (PEPCID) 20 mg tablet Take 1 tablet (20 mg total) by mouth every morning 90 tablet 3    folic acid (FOLVITE) 800 MCG tablet Take 1 tablet by mouth daily      hydroCHLOROthiazide 25 mg tablet Take 1 tablet (25 mg total) by mouth daily 90 tablet 3    omeprazole (PriLOSEC) 20 mg delayed release capsule TAKE 1 CAPSULE BY MOUTH EVERY DAY 90 capsule 1    potassium chloride (Klor-Con M10) 10 mEq tablet Take 1 tablet (10 mEq total) by mouth daily 90 tablet 3    prednisoLONE acetate (PRED FORTE) 1 % ophthalmic suspension       simvastatin (ZOCOR) 20 mg tablet Take 1 tablet (20 mg total) by mouth daily 90 tablet 3    valsartan (DIOVAN) 160 mg tablet Take 1 tablet (160 mg total) by mouth daily 90 tablet 3     No current facility-administered medications on file prior to visit.          Objective     /76   Ht 5' 4\" (1.626 m)   Wt 89.9 kg (198 lb 3.2 oz)   BMI 34.02 kg/m²     Physical Exam  Administrative Statements   I have spent a total time of 20 minutes in caring for this patient on the day of the visit/encounter including Diagnostic results, Prognosis, Risks and benefits of tx options, Instructions for management, Impressions, Counseling / Coordination of care, Documenting in the medical record, Reviewing / ordering tests, medicine, procedures  , and Obtaining or reviewing history  .  "

## 2024-12-04 DIAGNOSIS — I10 ESSENTIAL HYPERTENSION: ICD-10-CM

## 2024-12-04 DIAGNOSIS — E78.00 PURE HYPERCHOLESTEROLEMIA: ICD-10-CM

## 2024-12-04 RX ORDER — POTASSIUM CHLORIDE 750 MG/1
10 TABLET, EXTENDED RELEASE ORAL DAILY
Qty: 90 TABLET | Refills: 1 | Status: CANCELLED | OUTPATIENT
Start: 2024-12-04

## 2024-12-04 RX ORDER — SIMVASTATIN 20 MG
20 TABLET ORAL DAILY
Qty: 90 TABLET | Refills: 1 | Status: CANCELLED | OUTPATIENT
Start: 2024-12-04

## 2024-12-04 NOTE — TELEPHONE ENCOUNTER
Patient called requesting refill for simvastatin. Patient made aware medication was refilled on 8.27.2024 for 90 with 3 refills to [facility] pharmacy. Patient instructed to contact the pharmacy to obtain refills of medication. Patient verbalized understanding.     Patient called requesting refill for potassium. Patient made aware medication was refilled on 02.26.2024 for 90 with 3 refills to [facility] pharmacy. Patient instructed to contact the pharmacy to obtain remaining refills of medication. Patient verbalized understanding.

## 2024-12-05 ENCOUNTER — OFFICE VISIT (OUTPATIENT)
Dept: SURGICAL ONCOLOGY | Facility: CLINIC | Age: 84
End: 2024-12-05
Payer: MEDICARE

## 2024-12-05 VITALS
HEART RATE: 62 BPM | OXYGEN SATURATION: 96 % | HEIGHT: 64 IN | SYSTOLIC BLOOD PRESSURE: 150 MMHG | WEIGHT: 198 LBS | TEMPERATURE: 97.1 F | BODY MASS INDEX: 33.8 KG/M2 | DIASTOLIC BLOOD PRESSURE: 68 MMHG

## 2024-12-05 DIAGNOSIS — Z12.31 SCREENING MAMMOGRAM FOR BREAST CANCER: ICD-10-CM

## 2024-12-05 DIAGNOSIS — Z80.3 FAMILY HISTORY OF BREAST CANCER: ICD-10-CM

## 2024-12-05 DIAGNOSIS — N60.19 FIBROCYSTIC BREAST CHANGES, UNSPECIFIED LATERALITY: Primary | ICD-10-CM

## 2024-12-05 PROCEDURE — 99213 OFFICE O/P EST LOW 20 MIN: CPT | Performed by: SURGERY

## 2024-12-05 NOTE — PROGRESS NOTES
Surgical Oncology Follow Up       [address] [facility] SURGICAL ONCOLOGY SALLY  [address] [facility] [address]    Bharati Rico  1940  [id number]  [address] [facility] SURGICAL ONCOLOGY SALLY  [address] [facility] [address]    Chief Complaint   Patient presents with    Follow-up       Assessment & Plan   Diagnoses and all orders for this visit:    Fibrocystic breast changes, unspecified laterality    Screening mammogram for breast cancer  -     Mammo screening bilateral w 3d and cad; Future    Family history of breast cancer        Advance Care Planning/Advance Directives:  Did not discuss  with the patient.     Oncology History:    Oncology History    No history exists.       History of Present Illness: Follow-up visit secondary to fibrocystic changes and family history of breast cancer, denies any breast referable concerns herself  -Interval History: Recent screening mammogram was called back for ultrasound    Review of Systems:  Review of Systems   Constitutional: Negative.  Negative for appetite change, fever and unexpected weight change.   HENT:  Positive for hearing loss. Negative for trouble swallowing.    Eyes: Negative.    Respiratory: Negative.  Negative for cough and shortness of breath.    Cardiovascular: Negative.  Negative for chest pain.   Gastrointestinal: Negative.  Negative for abdominal pain, nausea and vomiting.   Endocrine: Negative.    Genitourinary:  Negative for dysuria.        Ovarian cyst   Musculoskeletal: Negative.  Negative for arthralgias and myalgias.   Skin: Negative.    Allergic/Immunologic: Negative.    Neurological: Negative.  Negative for headaches.   Hematological: Negative.  Negative for adenopathy. Does not bruise/bleed easily.   Psychiatric/Behavioral: Negative.         Patient Active Problem List   Diagnosis    History of DVT of lower extremity    Essential hypertension    Fibrocystic  breast changes    Gastroesophageal reflux disease    Hearing loss    Hiatal hernia    Hyperlipidemia    Varicose veins of left lower extremity    Obesity    Vertigo    Osteoarthritis of glenohumeral joint, left    Family history of breast cancer    Prediabetes     Past Medical History:   Diagnosis Date    Acid reflux     Allergic unknown    Sulfa, Motrin    BPPV (benign paroxysmal positional vertigo)     DVT (deep venous thrombosis) ([facility]) 2018    GERD (gastroesophageal reflux disease) unknown    Hearing loss     HL (hearing loss) unknown    ear problems since 3 1/2 years old.  Aids in 1966.    Hypercholesterolemia     Hypertension     Impingement syndrome of left shoulder 2019    Shingles 2009    had vaccine in ; shringrix given  and     Tonsillitis     tonsils removed age 8    Wears glasses      Past Surgical History:   Procedure Laterality Date    BREAST BIOPSY Left      left,  unknown side    COLONOSCOPY      TONSILLECTOMY      TUBAL LIGATION      UPPER GASTROINTESTINAL ENDOSCOPY      WISDOM TOOTH EXTRACTION       Family History   Problem Relation Age of Onset    Diabetes Mother     Hypertension Mother     Hearing loss Mother              Cancer Father         Colon    Diabetes Father     Hypertension Father     Colon cancer Father 65    Lymphoma Sister 65    Lung cancer Sister 57    Hypertension Sister     Diabetes Sister     Cancer Sister         Lung and non-Hodgkin    Seizures Sister     Congenital heart disease Daughter     No Known Problems Daughter     No Known Problems Maternal Grandmother     No Known Problems Maternal Grandfather     No Known Problems Paternal Grandmother     No Known Problems Paternal Grandfather     Skin cancer Brother 65        Melanoma    Prostate cancer Brother 65    Hypertension Brother     Cancer Brother         Prostate    Hypertension Brother     Cancer Brother         Skin    Glaucoma Brother     Cancer Brother         Prostate     Hypertension Brother      Social History     Socioeconomic History    Marital status: /Civil Union     Spouse name: Not on file    Number of children: Not on file    Years of education: Not on file    Highest education level: Not on file   Occupational History    Not on file   Tobacco Use    Smoking status: Never     Passive exposure: Never    Smokeless tobacco: Never   Vaping Use    Vaping status: Never Used   Substance and Sexual Activity    Alcohol use: Yes     Alcohol/week: 1.0 standard drink of alcohol     Types: 1 Glasses of wine per week     Comment: Not weekly, maybe monthly one glass of wine    Drug use: No    Sexual activity: Yes     Partners: Male     Birth control/protection: Post-menopausal, Female Sterilization   Other Topics Concern    Not on file   Social History Narrative    Not on file     Social Drivers of Health     Financial Resource Strain: Low Risk  (8/17/2023)    Overall Financial Resource Strain (CARDIA)     Difficulty of Paying Living Expenses: Not hard at all   Food Insecurity: No Food Insecurity (8/20/2024)    Nursing - Inadequate Food Risk Classification     Worried About Running Out of Food in the Last Year: Never true     Ran Out of Food in the Last Year: Never true     Ran Out of Food in the Last Year: Not on file   Transportation Needs: No Transportation Needs (8/20/2024)    PRAPARE - Transportation     Lack of Transportation (Medical): No     Lack of Transportation (Non-Medical): No   Physical Activity: Not on file   Stress: Not on file   Social Connections: Not on file   Intimate Partner Violence: Not on file   Housing Stability: Low Risk  (8/20/2024)    Housing Stability Vital Sign     Unable to Pay for Housing in the Last Year: No     Number of Times Moved in the Last Year: 0     Homeless in the Last Year: No       Current Outpatient Medications:     amLODIPine (NORVASC) 5 mg tablet, Take 1 tablet (5 mg total) by mouth 2 (two) times a day, Disp: 180 tablet, Rfl: 3     Ascorbic Acid (VITAMIN C) 500 MG CAPS, Take 1 capsule by mouth daily, Disp: , Rfl:     Calcium Carbonate-Vitamin D3 600-400 MG-UNIT TABS, Take by mouth in the morning, Disp: , Rfl:     Cholecalciferol (VITAMIN D) 2000 units tablet, Take by mouth daily, Disp: , Rfl:     famotidine (PEPCID) 20 mg tablet, Take 1 tablet (20 mg total) by mouth every morning, Disp: 90 tablet, Rfl: 3    folic acid (FOLVITE) 800 MCG tablet, Take 1 tablet by mouth daily, Disp: , Rfl:     hydroCHLOROthiazide 25 mg tablet, Take 1 tablet (25 mg total) by mouth daily, Disp: 90 tablet, Rfl: 3    omeprazole (PriLOSEC) 20 mg delayed release capsule, TAKE 1 CAPSULE BY MOUTH EVERY DAY, Disp: 90 capsule, Rfl: 1    potassium chloride (Klor-Con M10) 10 mEq tablet, Take 1 tablet (10 mEq total) by mouth daily, Disp: 90 tablet, Rfl: 3    prednisoLONE acetate (PRED FORTE) 1 % ophthalmic suspension, , Disp: , Rfl:     simvastatin (ZOCOR) 20 mg tablet, Take 1 tablet (20 mg total) by mouth daily, Disp: 90 tablet, Rfl: 3    valsartan (DIOVAN) 160 mg tablet, Take 1 tablet (160 mg total) by mouth daily, Disp: 90 tablet, Rfl: 3  Allergies   Allergen Reactions    Sulfa Antibiotics Other (See Comments)     Unknown reaction     Motrin [Ibuprofen] GI Intolerance     If on prescription strength, over the counter she does fine with       The following portions of the patient's history were reviewed and updated as appropriate: allergies, current medications, past family history, past medical history, past social history, past surgical history, and problem list.        Vitals:    12/05/24 0840   BP: 150/68   Pulse: 62   Temp: (!) 97.1 °F (36.2 °C)   SpO2: 96%       Physical Exam  Constitutional:       General: She is not in acute distress.     Appearance: Normal appearance. She is well-developed.   HENT:      Head: Normocephalic and atraumatic.   Chest:   Breasts:     Right: No swelling, bleeding, inverted nipple, mass, nipple discharge, skin change or tenderness.       Left: No swelling, bleeding, inverted nipple, mass, nipple discharge, skin change or tenderness.   Musculoskeletal:      Right lower leg: No edema.      Left lower leg: No edema.   Lymphadenopathy:      Upper Body:      Right upper body: No supraclavicular, axillary or pectoral adenopathy.      Left upper body: No supraclavicular, axillary or pectoral adenopathy.   Neurological:      Mental Status: She is alert and oriented to person, place, and time.   Psychiatric:         Mood and Affect: Mood normal.           Results:  Labs:      Imaging  10/21/2024 bilateral 3D screening mammogram was a BI-RADS 0 left side, right side was benign    10/24/2024 left breast ultrasound shows a simple cyst corresponding to the mammographic concern    I reviewed the above imaging data.    Discussion/Summary: 83-year-old female with history of fibrocystic changes and family history of breast cancer.  She was called back on her recent screening mammogram for an ultrasound which showed a simple cyst only.  There are no concerns on exam today.  I will therefore see her again in 1 year following her mammogram or sooner should the need arise.

## 2024-12-29 DIAGNOSIS — K21.9 GASTROESOPHAGEAL REFLUX DISEASE WITHOUT ESOPHAGITIS: ICD-10-CM

## 2025-01-29 DIAGNOSIS — I10 ESSENTIAL HYPERTENSION: ICD-10-CM

## 2025-01-29 RX ORDER — POTASSIUM CHLORIDE 750 MG/1
10 TABLET, EXTENDED RELEASE ORAL DAILY
Qty: 90 TABLET | Refills: 1 | Status: SHIPPED | OUTPATIENT
Start: 2025-01-29

## 2025-02-19 DIAGNOSIS — E78.00 PURE HYPERCHOLESTEROLEMIA: ICD-10-CM

## 2025-02-19 DIAGNOSIS — I10 ESSENTIAL HYPERTENSION: ICD-10-CM

## 2025-02-20 RX ORDER — AMLODIPINE BESYLATE 5 MG/1
5 TABLET ORAL 2 TIMES DAILY
Qty: 180 TABLET | Refills: 1 | Status: SHIPPED | OUTPATIENT
Start: 2025-02-20

## 2025-02-20 RX ORDER — HYDROCHLOROTHIAZIDE 25 MG/1
25 TABLET ORAL DAILY
Qty: 90 TABLET | Refills: 1 | Status: SHIPPED | OUTPATIENT
Start: 2025-02-20

## 2025-02-24 ENCOUNTER — RA CDI HCC (OUTPATIENT)
Dept: OTHER | Facility: HOSPITAL | Age: 85
End: 2025-02-24

## 2025-02-25 PROBLEM — R42 VERTIGO: Status: RESOLVED | Noted: 2023-05-09 | Resolved: 2025-02-25

## 2025-02-25 NOTE — ASSESSMENT & PLAN NOTE
Hypertension blood pressures have been stable on Amlodipine 5 mg BID, Valsartan 160 mg daily and HCTZ 25 mg daily.     BP Readings from Last 3 Encounters:   03/03/25 140/80   12/05/24 150/68   10/30/24 160/76       Lab Results   Component Value Date    SODIUM 138 02/27/2025    K 4.1 02/27/2025     02/27/2025    CO2 28 02/27/2025    AGAP 9 02/27/2025    BUN 23 02/27/2025    CREATININE 0.89 02/27/2025    GLUC 168 (H) 05/09/2023    GLUF 98 02/27/2025    CALCIUM 9.4 02/27/2025    AST 25 02/27/2025    ALT 17 02/27/2025    ALKPHOS 82 02/27/2025    TP 6.9 02/27/2025    TBILI 0.72 02/27/2025    EGFR 60 02/27/2025     Lab Results   Component Value Date    WBC 5.35 02/27/2025    HGB 14.8 02/27/2025    HCT 46.6 (H) 02/27/2025    MCV 88 02/27/2025     02/27/2025     Continue with current medications.  Monitor BP at home       Orders:    Comprehensive metabolic panel    CBC and differential

## 2025-02-25 NOTE — ASSESSMENT & PLAN NOTE
Lab Results   Component Value Date    HGBA1C 6.1 (H) 02/27/2025     Diet weight loss and exercise      Orders:    Hemoglobin A1C

## 2025-02-25 NOTE — PROGRESS NOTES
Name: Bharati Rico      : 1940      MRN: [id number]  Encounter Provider: Jonathon Pope MD  Encounter Date: 3/3/2025   Encounter department: [facility]  :  Assessment & Plan  Essential hypertension    Hypertension blood pressures have been stable on Amlodipine 5 mg BID, Valsartan 160 mg daily and HCTZ 25 mg daily.     BP Readings from Last 3 Encounters:   25 140/80   24 150/68   10/30/24 160/76       Lab Results   Component Value Date    SODIUM 138 2025    K 4.1 2025     2025    CO2 28 2025    AGAP 9 2025    BUN 23 2025    CREATININE 0.89 2025    GLUC 168 (H) 2023    GLUF 98 2025    CALCIUM 9.4 2025    AST 25 2025    ALT 17 2025    ALKPHOS 82 2025    TP 6.9 2025    TBILI 0.72 2025    EGFR 60 2025     Lab Results   Component Value Date    WBC 5.35 2025    HGB 14.8 2025    HCT 46.6 (H) 2025    MCV 88 2025     2025     Continue with current medications.  Monitor BP at home       Orders:    Comprehensive metabolic panel    CBC and differential    Pure hypercholesterolemia    Hyperlipidemia on Simvastatin 20 mg/day- 2024  lipid profile cholesterol 178. Triglycerides 102. HDL 63. LDL 95. LFTs normal.    Lab Results   Component Value Date    CHOLESTEROL 202 (H) 2025    CHOLESTEROL 178 2024    CHOLESTEROL 192 08/10/2023     Lab Results   Component Value Date    HDL 59 2025    HDL 63 2024    HDL 67 08/10/2023     Lab Results   Component Value Date    TRIG 108 2025    TRIG 102 2024    TRIG 76 08/10/2023     Lab Results   Component Value Date    LDLCALC 121 (H) 2025     Watch diet    Orders:    Lipid panel    Prediabetes    Lab Results   Component Value Date    HGBA1C 6.1 (H) 2025     Diet weight loss and exercise      Orders:    Hemoglobin A1C    Gastroesophageal reflux disease without  esophagitis    GERD stable on Famotidine 20 mg daily. She uses  prn  Omeprazole 20 mg  No dysphagia. No history of Mauro's. 04/2021 EGD Normal except for hiatal hernia.         Hiatal hernia         OV 6 months with labs        History of Present Illness     Follow up visit for chronic medical problems  Medications reviewed.          Review of Systems   Constitutional:  Negative for appetite change, chills, fatigue, fever and unexpected weight change.   HENT:  Positive for hearing loss (bilateral hearing aids. ). Negative for congestion, ear pain, postnasal drip, rhinorrhea, sore throat and trouble swallowing.         Prior ENT evaluation for LPR   Eyes:  Negative for visual disturbance.   Respiratory:  Negative for cough, shortness of breath and wheezing.    Cardiovascular:  Negative for chest pain, palpitations and leg swelling.        5/2023 echocardiogram normal left ventricular systolic function EF 55%.  Left ventricular cavity size normal.  Wall thickness normal.  Wall motion normal.  Mildly dilated left atrium.  Mild MR.  Mild TR.      Past history of DVT left leg treated with Eliquis. No recurrence. No FH venous thrombosis.    Gastrointestinal:  Negative for abdominal pain, blood in stool, constipation, diarrhea, nausea and vomiting.         04/2021 colonoscopy normal except for mild diverticulosis sigmoid colon   Endocrine:        2017 DEXA scan normal            Component                Value               Date                       [id number]             1.550               03/18/2021               Genitourinary:  Negative for difficulty urinating.        7/2024 for pelvic ultrasound slight interval enlargement of right ovarian 1.3 cm unilocular cystic lesion.  Endometrial stripe 6 mm.  Mild interval enlargement of anterior fundal fibroid.   Musculoskeletal:  Negative for arthralgias and myalgias.   Skin:  Negative for rash.   Neurological:  Negative for dizziness and headaches.   Hematological:   "Negative for adenopathy. Does not bruise/bleed easily.   Psychiatric/Behavioral:  Negative for dysphoric mood and sleep disturbance.        Objective     /80 (BP Location: Left arm, Patient Position: Sitting, Cuff Size: Large)   Temp 98.2 °F (36.8 °C) (Temporal)   Resp 16   Ht 5' 4\" (1.626 m)   Wt 88.7 kg (195 lb 8 oz)   BMI 33.56 kg/m²      Wt Readings from Last 3 Encounters:   03/03/25 88.7 kg (195 lb 8 oz)   12/05/24 89.8 kg (198 lb)   11/07/24 89.8 kg (198 lb)          Physical Exam  Constitutional:       General: She is not in acute distress.  Eyes:      General: No scleral icterus.     Conjunctiva/sclera: Conjunctivae normal.   Neck:      Thyroid: No thyroid mass or thyromegaly.      Vascular: Normal carotid pulses. No carotid bruit.   Cardiovascular:      Rate and Rhythm: Normal rate and regular rhythm.      Heart sounds: No murmur heard.     No gallop.   Pulmonary:      Effort: Pulmonary effort is normal.      Breath sounds: Normal breath sounds. No wheezing or rales.   Musculoskeletal:      Right lower leg: No edema.      Left lower leg: No edema.   Lymphadenopathy:      Cervical: No cervical adenopathy.      Upper Body:      Right upper body: No supraclavicular adenopathy.      Left upper body: No supraclavicular adenopathy.   Skin:     Coloration: Skin is not cyanotic.      Findings: No rash.      Nails: There is no clubbing.   Neurological:      General: No focal deficit present.      Mental Status: She is alert and oriented to person, place, and time.   Psychiatric:         Mood and Affect: Mood normal.         "

## 2025-02-25 NOTE — ASSESSMENT & PLAN NOTE
GERD stable on Famotidine 20 mg daily. She uses  prn  Omeprazole 20 mg  No dysphagia. No history of Mauro's. 04/2021 EGD Normal except for hiatal hernia.

## 2025-02-25 NOTE — ASSESSMENT & PLAN NOTE
Hyperlipidemia on Simvastatin 20 mg/day- 07/2024  lipid profile cholesterol 178. Triglycerides 102. HDL 63. LDL 95. LFTs normal.    Lab Results   Component Value Date    CHOLESTEROL 202 (H) 02/27/2025    CHOLESTEROL 178 07/09/2024    CHOLESTEROL 192 08/10/2023     Lab Results   Component Value Date    HDL 59 02/27/2025    HDL 63 07/09/2024    HDL 67 08/10/2023     Lab Results   Component Value Date    TRIG 108 02/27/2025    TRIG 102 07/09/2024    TRIG 76 08/10/2023     Lab Results   Component Value Date    LDLCALC 121 (H) 02/27/2025     Watch diet    Orders:    Lipid panel

## 2025-02-27 ENCOUNTER — APPOINTMENT (OUTPATIENT)
Dept: LAB | Facility: CLINIC | Age: 85
End: 2025-02-27
Payer: MEDICARE

## 2025-02-27 LAB
ALBUMIN SERPL BCG-MCNC: 4 G/DL (ref 3.5–5)
ALP SERPL-CCNC: 82 U/L (ref 34–104)
ALT SERPL W P-5'-P-CCNC: 17 U/L (ref 7–52)
ANION GAP SERPL CALCULATED.3IONS-SCNC: 9 MMOL/L (ref 4–13)
AST SERPL W P-5'-P-CCNC: 25 U/L (ref 13–39)
BASOPHILS # BLD AUTO: 0.03 THOUSANDS/ÂΜL (ref 0–0.1)
BASOPHILS NFR BLD AUTO: 1 % (ref 0–1)
BILIRUB SERPL-MCNC: 0.72 MG/DL (ref 0.2–1)
BUN SERPL-MCNC: 23 MG/DL (ref 5–25)
CALCIUM SERPL-MCNC: 9.4 MG/DL (ref 8.4–10.2)
CHLORIDE SERPL-SCNC: 101 MMOL/L (ref 96–108)
CHOLEST SERPL-MCNC: 202 MG/DL (ref ?–200)
CO2 SERPL-SCNC: 28 MMOL/L (ref 21–32)
CREAT SERPL-MCNC: 0.89 MG/DL (ref 0.6–1.3)
EOSINOPHIL # BLD AUTO: 0.09 THOUSAND/ÂΜL (ref 0–0.61)
EOSINOPHIL NFR BLD AUTO: 2 % (ref 0–6)
ERYTHROCYTE [DISTWIDTH] IN BLOOD BY AUTOMATED COUNT: 13.7 % (ref 11.6–15.1)
EST. AVERAGE GLUCOSE BLD GHB EST-MCNC: 128 MG/DL
GFR SERPL CREATININE-BSD FRML MDRD: 60 ML/MIN/1.73SQ M
GLUCOSE P FAST SERPL-MCNC: 98 MG/DL (ref 65–99)
HBA1C MFR BLD: 6.1 %
HCT VFR BLD AUTO: 46.6 % (ref 34.8–46.1)
HDLC SERPL-MCNC: 59 MG/DL
HGB BLD-MCNC: 14.8 G/DL (ref 11.5–15.4)
IMM GRANULOCYTES # BLD AUTO: 0.01 THOUSAND/UL (ref 0–0.2)
IMM GRANULOCYTES NFR BLD AUTO: 0 % (ref 0–2)
LDLC SERPL CALC-MCNC: 121 MG/DL (ref 0–100)
LYMPHOCYTES # BLD AUTO: 1.26 THOUSANDS/ÂΜL (ref 0.6–4.47)
LYMPHOCYTES NFR BLD AUTO: 24 % (ref 14–44)
MCH RBC QN AUTO: 28 PG (ref 26.8–34.3)
MCHC RBC AUTO-ENTMCNC: 31.8 G/DL (ref 31.4–37.4)
MCV RBC AUTO: 88 FL (ref 82–98)
MONOCYTES # BLD AUTO: 0.38 THOUSAND/ÂΜL (ref 0.17–1.22)
MONOCYTES NFR BLD AUTO: 7 % (ref 4–12)
NEUTROPHILS # BLD AUTO: 3.58 THOUSANDS/ÂΜL (ref 1.85–7.62)
NEUTS SEG NFR BLD AUTO: 66 % (ref 43–75)
NONHDLC SERPL-MCNC: 143 MG/DL
NRBC BLD AUTO-RTO: 0 /100 WBCS
PLATELET # BLD AUTO: 204 THOUSANDS/UL (ref 149–390)
PMV BLD AUTO: 11.6 FL (ref 8.9–12.7)
POTASSIUM SERPL-SCNC: 4.1 MMOL/L (ref 3.5–5.3)
PROT SERPL-MCNC: 6.9 G/DL (ref 6.4–8.4)
RBC # BLD AUTO: 5.29 MILLION/UL (ref 3.81–5.12)
SODIUM SERPL-SCNC: 138 MMOL/L (ref 135–147)
TRIGL SERPL-MCNC: 108 MG/DL (ref ?–150)
WBC # BLD AUTO: 5.35 THOUSAND/UL (ref 4.31–10.16)

## 2025-03-03 ENCOUNTER — OFFICE VISIT (OUTPATIENT)
Dept: FAMILY MEDICINE CLINIC | Facility: CLINIC | Age: 85
End: 2025-03-03
Payer: MEDICARE

## 2025-03-03 VITALS
HEIGHT: 64 IN | RESPIRATION RATE: 16 BRPM | SYSTOLIC BLOOD PRESSURE: 140 MMHG | WEIGHT: 195.5 LBS | TEMPERATURE: 98.2 F | BODY MASS INDEX: 33.38 KG/M2 | DIASTOLIC BLOOD PRESSURE: 80 MMHG

## 2025-03-03 DIAGNOSIS — E78.00 PURE HYPERCHOLESTEROLEMIA: ICD-10-CM

## 2025-03-03 DIAGNOSIS — K44.9 HIATAL HERNIA: ICD-10-CM

## 2025-03-03 DIAGNOSIS — K21.9 GASTROESOPHAGEAL REFLUX DISEASE WITHOUT ESOPHAGITIS: ICD-10-CM

## 2025-03-03 DIAGNOSIS — R73.03 PREDIABETES: ICD-10-CM

## 2025-03-03 DIAGNOSIS — I10 ESSENTIAL HYPERTENSION: Primary | ICD-10-CM

## 2025-03-03 PROCEDURE — G2211 COMPLEX E/M VISIT ADD ON: HCPCS | Performed by: FAMILY MEDICINE

## 2025-03-03 PROCEDURE — 99214 OFFICE O/P EST MOD 30 MIN: CPT | Performed by: FAMILY MEDICINE

## 2025-03-13 DIAGNOSIS — I10 ESSENTIAL HYPERTENSION: ICD-10-CM

## 2025-03-13 RX ORDER — VALSARTAN 160 MG/1
160 TABLET ORAL DAILY
Qty: 90 TABLET | Refills: 1 | Status: SHIPPED | OUTPATIENT
Start: 2025-03-13

## 2025-05-05 DIAGNOSIS — K21.9 GASTROESOPHAGEAL REFLUX DISEASE WITHOUT ESOPHAGITIS: ICD-10-CM

## 2025-05-05 RX ORDER — FAMOTIDINE 20 MG/1
20 TABLET, FILM COATED ORAL EVERY MORNING
Qty: 90 TABLET | Refills: 1 | Status: SHIPPED | OUTPATIENT
Start: 2025-05-05

## 2025-06-05 ENCOUNTER — TELEPHONE (OUTPATIENT)
Age: 85
End: 2025-06-05

## 2025-06-05 NOTE — TELEPHONE ENCOUNTER
Patient called to report recent blood pressure readings.Patient reported blood pressure reading on 6/3 100/70 and 103/76. Today 112/85 73, 131/83 75 before taking medications . Patient stated she got concerned from the blood pressure reading on 6/3,patient had mild dizziness.Patient asymptomatic today.Patient is currently taking   valsartan (DIOVAN) 160 mg tablet  daily,hydroCHLOROthiazide 25 mg tablet daily and   amLODIPine (NORVASC) 5 mg tablet  twice a day.Please advise.

## 2025-06-06 NOTE — TELEPHONE ENCOUNTER
"Pt returning our call. Relayed providers message to her \"See message I would decrease Amlodipine to 5 mg once a day. Continue to monitor BP JJM \"  Pt understood, and will take amlodipine 1 time a day, and will monitor her readings.   "

## 2025-06-06 NOTE — TELEPHONE ENCOUNTER
Called patient no response left message to call the office back, was calling to give message please see down below

## 2025-06-22 DIAGNOSIS — K21.9 GASTROESOPHAGEAL REFLUX DISEASE WITHOUT ESOPHAGITIS: ICD-10-CM

## 2025-06-22 RX ORDER — OMEPRAZOLE 20 MG/1
20 CAPSULE, DELAYED RELEASE ORAL DAILY
Qty: 90 CAPSULE | Refills: 1 | Status: SHIPPED | OUTPATIENT
Start: 2025-06-22

## 2025-08-02 DIAGNOSIS — I10 ESSENTIAL HYPERTENSION: ICD-10-CM

## 2025-08-04 RX ORDER — POTASSIUM CHLORIDE 750 MG/1
10 TABLET, EXTENDED RELEASE ORAL DAILY
Qty: 90 TABLET | Refills: 1 | Status: SHIPPED | OUTPATIENT
Start: 2025-08-04

## 2025-08-12 ENCOUNTER — TELEPHONE (OUTPATIENT)
Dept: SURGICAL ONCOLOGY | Facility: CLINIC | Age: 85
End: 2025-08-12

## 2025-08-20 ENCOUNTER — OFFICE VISIT (OUTPATIENT)
Dept: CARDIOLOGY CLINIC | Facility: CLINIC | Age: 85
End: 2025-08-20
Payer: MEDICARE

## 2025-08-20 VITALS
HEIGHT: 64 IN | HEART RATE: 73 BPM | WEIGHT: 196.5 LBS | BODY MASS INDEX: 33.55 KG/M2 | SYSTOLIC BLOOD PRESSURE: 158 MMHG | DIASTOLIC BLOOD PRESSURE: 84 MMHG

## 2025-08-20 DIAGNOSIS — R00.1 BRADYCARDIA: Primary | ICD-10-CM

## 2025-08-20 DIAGNOSIS — I10 ESSENTIAL HYPERTENSION: ICD-10-CM

## 2025-08-20 LAB
ATRIAL RATE: 73 BPM
PR INTERVAL: 166 MS
QRS AXIS: 38 DEGREES
QRSD INTERVAL: 102 MS
QT INTERVAL: 388 MS
QTC INTERVAL: 428 MS
T WAVE AXIS: 33 DEGREES
VENTRICULAR RATE: 73 BPM

## 2025-08-20 PROCEDURE — 99213 OFFICE O/P EST LOW 20 MIN: CPT | Performed by: INTERNAL MEDICINE

## 2025-08-20 PROCEDURE — 93000 ELECTROCARDIOGRAM COMPLETE: CPT | Performed by: INTERNAL MEDICINE
